# Patient Record
Sex: MALE | Race: WHITE | NOT HISPANIC OR LATINO | Employment: FULL TIME | URBAN - METROPOLITAN AREA
[De-identification: names, ages, dates, MRNs, and addresses within clinical notes are randomized per-mention and may not be internally consistent; named-entity substitution may affect disease eponyms.]

---

## 2017-01-10 ENCOUNTER — PATIENT MESSAGE (OUTPATIENT)
Dept: PSYCHIATRY | Facility: CLINIC | Age: 32
End: 2017-01-10

## 2017-01-10 DIAGNOSIS — F90.0 ATTENTION DEFICIT HYPERACTIVITY DISORDER (ADHD), PREDOMINANTLY INATTENTIVE TYPE: ICD-10-CM

## 2017-01-10 RX ORDER — LISDEXAMFETAMINE DIMESYLATE 50 MG/1
50 CAPSULE ORAL EVERY MORNING
Qty: 30 CAPSULE | Refills: 0 | Status: SHIPPED | OUTPATIENT
Start: 2017-01-10 | End: 2017-01-24 | Stop reason: SDUPTHER

## 2017-01-16 ENCOUNTER — TELEPHONE (OUTPATIENT)
Dept: HEMATOLOGY/ONCOLOGY | Facility: CLINIC | Age: 32
End: 2017-01-16

## 2017-01-16 ENCOUNTER — OFFICE VISIT (OUTPATIENT)
Dept: HEMATOLOGY/ONCOLOGY | Facility: CLINIC | Age: 32
End: 2017-01-16
Payer: COMMERCIAL

## 2017-01-16 ENCOUNTER — CLINICAL SUPPORT (OUTPATIENT)
Dept: HEMATOLOGY/ONCOLOGY | Facility: CLINIC | Age: 32
End: 2017-01-16
Payer: COMMERCIAL

## 2017-01-16 VITALS
TEMPERATURE: 98 F | RESPIRATION RATE: 18 BRPM | SYSTOLIC BLOOD PRESSURE: 160 MMHG | DIASTOLIC BLOOD PRESSURE: 109 MMHG | WEIGHT: 315 LBS | HEIGHT: 76 IN | HEART RATE: 88 BPM | BODY MASS INDEX: 38.36 KG/M2

## 2017-01-16 VITALS — WEIGHT: 315 LBS | HEIGHT: 76 IN | BODY MASS INDEX: 38.36 KG/M2

## 2017-01-16 DIAGNOSIS — F90.1 ATTENTION-DEFICIT HYPERACTIVITY DISORDER, PREDOMINANTLY HYPERACTIVE TYPE: Primary | ICD-10-CM

## 2017-01-16 DIAGNOSIS — F41.1 GENERALIZED ANXIETY DISORDER: ICD-10-CM

## 2017-01-16 DIAGNOSIS — E66.9 OBESITY (BMI 30-39.9): Primary | ICD-10-CM

## 2017-01-16 DIAGNOSIS — D61.3 IDIOPATHIC APLASTIC ANEMIA: ICD-10-CM

## 2017-01-16 DIAGNOSIS — F17.200 SMOKING ADDICTION: ICD-10-CM

## 2017-01-16 DIAGNOSIS — Z71.3 DIETARY COUNSELING: ICD-10-CM

## 2017-01-16 DIAGNOSIS — F41.0 PANIC DISORDER WITHOUT AGORAPHOBIA: ICD-10-CM

## 2017-01-16 DIAGNOSIS — I10 ESSENTIAL HYPERTENSION: ICD-10-CM

## 2017-01-16 PROCEDURE — 99213 OFFICE O/P EST LOW 20 MIN: CPT | Mod: S$GLB,,, | Performed by: INTERNAL MEDICINE

## 2017-01-16 PROCEDURE — 97802 MEDICAL NUTRITION INDIV IN: CPT | Mod: S$GLB,,, | Performed by: DIETITIAN, REGISTERED

## 2017-01-16 PROCEDURE — 99999 PR PBB SHADOW E&M-EST. PATIENT-LVL III: CPT | Mod: PBBFAC,,,

## 2017-01-16 PROCEDURE — 3077F SYST BP >= 140 MM HG: CPT | Mod: S$GLB,,, | Performed by: INTERNAL MEDICINE

## 2017-01-16 PROCEDURE — 3080F DIAST BP >= 90 MM HG: CPT | Mod: S$GLB,,, | Performed by: INTERNAL MEDICINE

## 2017-01-16 PROCEDURE — 99999 PR PBB SHADOW E&M-EST. PATIENT-LVL III: CPT | Mod: PBBFAC,,, | Performed by: INTERNAL MEDICINE

## 2017-01-16 PROCEDURE — 1159F MED LIST DOCD IN RCRD: CPT | Mod: S$GLB,,, | Performed by: INTERNAL MEDICINE

## 2017-01-16 NOTE — TELEPHONE ENCOUNTER
----- Message from Megha Brown sent at 1/16/2017  9:47 AM CST -----  Contact: self  Pt is calling to see if he can see Dr. Bellamy as soon as possible. He states he called him yesterday with test results. He states he would like to discuss them in person. He states he has bad anxiety right now bc of this.     Contact number is [phone]

## 2017-01-16 NOTE — TELEPHONE ENCOUNTER
----- Message from Mehga Brown sent at 1/16/2017  1:30 PM CST -----  Contact: self  Pt states he feels the need to be seen today bc he is having anxiety problems bc of the test results Dr. Bellamy gave him yesterday. He also states he only wants to see Dr. Bellamy.    Contact number is [phone]

## 2017-01-16 NOTE — PROGRESS NOTES
"Referring Physician:Kp Bellamy MD     Reason for visit:  Chief Complaint   Patient presents with    Nutrition Counseling    Obesity    Hypertension        :1985     Allergies Reviewed  Meds Reviewed    Anthropometrics  Weight:(!) 146.5 kg (323 lb)  Height:6' 4" (1.93 m)  BMI:Body mass index is 39.32 kg/(m^2).   IBW:   91.8 kg      Meds:  Outpatient Medications Prior to Visit   Medication Sig Dispense Refill    amlodipine (NORVASC) 10 MG tablet Take 1 tablet (10 mg total) by mouth once daily. 90 tablet 1    CALCIUM CITRATE/VITAMIN D3 (CITRACAL REGULAR ORAL) Take by mouth.      citalopram (CELEXA) 40 MG tablet Take 1 tablet (40 mg total) by mouth once daily. 90 tablet 1    cycloSPORINE modified 25 MG capsule Take 1 capsule (25 mg total) by mouth once daily. 50 capsule 0    ergocalciferol (VITAMIN D2) 50,000 unit Cap Take 50,000 Units by mouth every 7 days.      fexofenadine (ALLEGRA) 30 MG tablet Take 30 mg by mouth as needed.       fluticasone (FLONASE) 50 mcg/actuation nasal spray 2 sprays by Each Nare route once daily. 16 g 3    [START ON 2017] lisdexamfetamine (VYVANSE) 50 MG capsule Take 1 capsule (50 mg total) by mouth every morning. 30 capsule 0    lisdexamfetamine (VYVANSE) 50 MG capsule Take 1 capsule (50 mg total) by mouth every morning. 30 capsule 0    lorazepam (ATIVAN) 1 MG tablet Take 1 tablet (1 mg total) by mouth daily as needed for Anxiety. 30 tablet 5    nystatin-triamcinolone (MYCOLOG) ointment AAA at corner of mouth BID 30 g 1    tretinoin (RETIN-A) 0.025 % cream Apply small amount to entire face qhs. Wash off qam and apply sunscreen. 45 g 3    triamcinolone acetonide 0.025% (KENALOG) 0.025 % Oint Apply topically 2 (two) times daily. Mixed with nystatin ointment to corner of mouth 15 g 1    triamterene-hydrochlorothiazide 37.5-25 mg (DYAZIDE) 37.5-25 mg per capsule TAKE 1 CAPSULE BY MOUTH DAILY 30 capsule 0    valacyclovir (VALTREX) 1000 MG tablet Take 1 tablet " "(1,000 mg total) by mouth every 12 (twelve) hours. Take 2 tabs po BID x 1 day. Begin at first sign of outbreak 180 tablet 1    varenicline (CHANTIX) 1 mg Tab Take 1 tablet (1 mg total) by mouth 2 (two) times daily. 60 tablet 2    VITAMIN B COMPLEX ORAL Take by mouth.       No facility-administered medications prior to visit.          Labs:   N/A     Estimated Nutrition Needs:   2295 Kcals/day( 25 kcal/kg IBW),    73 g protein( 0.8 g/kg IBW)     Diet Hx:   Pt & his fiance' present for encounter for weight management.  Pt is lactose-intolerant.  Pt reports challenges with binge-eating, and states his counselor has advised he eat small, frequent meals/snacks in lieu of 3 meals/day.  Beverages:  Diet Coke; water; Powerade Zero; black coffee; 2 alcoholic beverages nightly (gin and diet tonic).   Exercise:  Inconsistent.    Breakfast:   Black coffee (doesn't eat cereal due to lactose-intolerance; we discussed soymilk or unsweetened almond milk as substitute)  Lunch:   Salad with grilled protein, 1000 island or blue cheese dressing if eating out/at home prepares olive oil and vinegar dressing.  Occasional 6" [employer] sandwich.  Burger Jose Armando Whopper-no cheese/no fries.  Dinner:   Veggie pizza; starch like parul bread/chick pea pasta/rice, veggies, lean protein.    Assessment:   Pt & finance' asked relevant questions about foods recommended & to avoid; healthy portion sizes; reading food labels; sample meal plan and menus; chick pea pasta as healthy carb; salt as seasoning.  All questions answered, and they verbalized understanding of information.      Nutrition Diagnosis:   Obesity RT excess energy intake and physical inactivity AEB Obesity - grade II, BMI 35-39.9    Recommendations: Low fat, high fiber diet.  Exercise 30 minutes per day, 3-5 days per week.  Handouts provided and reviewed:  Cardiac Nutrition Therapy; 1800 Calorie Sample 5-Day Menus; Weight Loss Tips; Label Reading Tips for Weight Management; Cooking Tips for " Weight Management; Get Fit Shopping List; Eating Right for a Healthy Weight; Eat Fit Plan...Anytime/Anywhere; Health and Nutrition-Related Websites; Servings of Carbohydrates for Meal Planning; Walking Works; Building a Healthy Plate;  Heart Healthy Eating:  Shopping Tips and Label Reading Tips        Consultation Time:60 minutes.    Follow Up:  Pt provided with dietitian contact number and advised to call with questions or make future appointment if further intervention needed.
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
## 2017-01-16 NOTE — TELEPHONE ENCOUNTER
The message was forwarded to Dr. Bellamy'  and MA.   reports to have tried to schedule patient but there was no answer.

## 2017-01-24 ENCOUNTER — OFFICE VISIT (OUTPATIENT)
Dept: PSYCHIATRY | Facility: CLINIC | Age: 32
End: 2017-01-24
Payer: COMMERCIAL

## 2017-01-24 ENCOUNTER — LAB VISIT (OUTPATIENT)
Dept: LAB | Facility: HOSPITAL | Age: 32
End: 2017-01-24
Attending: INTERNAL MEDICINE
Payer: COMMERCIAL

## 2017-01-24 VITALS
BODY MASS INDEX: 38.36 KG/M2 | HEART RATE: 98 BPM | DIASTOLIC BLOOD PRESSURE: 99 MMHG | WEIGHT: 315 LBS | SYSTOLIC BLOOD PRESSURE: 134 MMHG | HEIGHT: 76 IN

## 2017-01-24 DIAGNOSIS — F32.5 MAJOR DEPRESSIVE DISORDER WITH SINGLE EPISODE, IN FULL REMISSION: ICD-10-CM

## 2017-01-24 DIAGNOSIS — D61.9 ANEMIA DUE TO BONE MARROW FAILURE: ICD-10-CM

## 2017-01-24 DIAGNOSIS — D61.3 IDIOPATHIC APLASTIC ANEMIA: ICD-10-CM

## 2017-01-24 DIAGNOSIS — F41.9 ANXIETY: ICD-10-CM

## 2017-01-24 DIAGNOSIS — I10 ESSENTIAL HYPERTENSION: ICD-10-CM

## 2017-01-24 DIAGNOSIS — F90.0 ATTENTION DEFICIT HYPERACTIVITY DISORDER (ADHD), PREDOMINANTLY INATTENTIVE TYPE: Primary | ICD-10-CM

## 2017-01-24 LAB
ALBUMIN SERPL BCP-MCNC: 4 G/DL
ANION GAP SERPL CALC-SCNC: 6 MMOL/L
BASOPHILS # BLD AUTO: 0.02 K/UL
BASOPHILS NFR BLD: 0.5 %
BILIRUB UR QL STRIP: NEGATIVE
BUN SERPL-MCNC: 15 MG/DL
CALCIUM SERPL-MCNC: 9.6 MG/DL
CHLORIDE SERPL-SCNC: 107 MMOL/L
CLARITY UR REFRACT.AUTO: ABNORMAL
CO2 SERPL-SCNC: 25 MMOL/L
COLOR UR AUTO: YELLOW
CREAT SERPL-MCNC: 0.9 MG/DL
DIFFERENTIAL METHOD: ABNORMAL
EOSINOPHIL # BLD AUTO: 0.1 K/UL
EOSINOPHIL NFR BLD: 1.7 %
ERYTHROCYTE [DISTWIDTH] IN BLOOD BY AUTOMATED COUNT: 14.4 %
EST. GFR  (AFRICAN AMERICAN): >60 ML/MIN/1.73 M^2
EST. GFR  (NON AFRICAN AMERICAN): >60 ML/MIN/1.73 M^2
GLUCOSE SERPL-MCNC: 106 MG/DL
GLUCOSE UR QL STRIP: NEGATIVE
HCT VFR BLD AUTO: 36.4 %
HGB BLD-MCNC: 13.2 G/DL
HGB UR QL STRIP: NEGATIVE
KETONES UR QL STRIP: NEGATIVE
LEUKOCYTE ESTERASE UR QL STRIP: NEGATIVE
LYMPHOCYTES # BLD AUTO: 1.4 K/UL
LYMPHOCYTES NFR BLD: 33.4 %
MCH RBC QN AUTO: 35.2 PG
MCHC RBC AUTO-ENTMCNC: 36.3 %
MCV RBC AUTO: 97 FL
MONOCYTES # BLD AUTO: 0.6 K/UL
MONOCYTES NFR BLD: 15.3 %
NEUTROPHILS # BLD AUTO: 2 K/UL
NEUTROPHILS NFR BLD: 48.9 %
NITRITE UR QL STRIP: NEGATIVE
PH UR STRIP: 8 [PH] (ref 5–8)
PHOSPHATE SERPL-MCNC: 2.2 MG/DL
PLATELET # BLD AUTO: 59 K/UL
PMV BLD AUTO: 10.4 FL
POTASSIUM SERPL-SCNC: 4.3 MMOL/L
PROT UR QL STRIP: ABNORMAL
RBC # BLD AUTO: 3.75 M/UL
SODIUM SERPL-SCNC: 138 MMOL/L
SP GR UR STRIP: 1.02 (ref 1–1.03)
URN SPEC COLLECT METH UR: ABNORMAL
UROBILINOGEN UR STRIP-ACNC: 1 EU/DL
WBC # BLD AUTO: 4.04 K/UL

## 2017-01-24 PROCEDURE — 3080F DIAST BP >= 90 MM HG: CPT | Mod: S$GLB,,, | Performed by: NURSE PRACTITIONER

## 2017-01-24 PROCEDURE — 99212 OFFICE O/P EST SF 10 MIN: CPT | Mod: S$GLB,,, | Performed by: NURSE PRACTITIONER

## 2017-01-24 PROCEDURE — 3075F SYST BP GE 130 - 139MM HG: CPT | Mod: S$GLB,,, | Performed by: NURSE PRACTITIONER

## 2017-01-24 PROCEDURE — 36415 COLL VENOUS BLD VENIPUNCTURE: CPT

## 2017-01-24 PROCEDURE — 99999 PR PBB SHADOW E&M-EST. PATIENT-LVL IV: CPT | Mod: PBBFAC,,, | Performed by: NURSE PRACTITIONER

## 2017-01-24 PROCEDURE — 88184 FLOWCYTOMETRY/ TC 1 MARKER: CPT

## 2017-01-24 PROCEDURE — 1159F MED LIST DOCD IN RCRD: CPT | Mod: S$GLB,,, | Performed by: NURSE PRACTITIONER

## 2017-01-24 PROCEDURE — 88188 FLOWCYTOMETRY/READ 9-15: CPT

## 2017-01-24 PROCEDURE — 85025 COMPLETE CBC W/AUTO DIFF WBC: CPT

## 2017-01-24 PROCEDURE — 80069 RENAL FUNCTION PANEL: CPT

## 2017-01-24 PROCEDURE — 81003 URINALYSIS AUTO W/O SCOPE: CPT

## 2017-01-24 PROCEDURE — 90833 PSYTX W PT W E/M 30 MIN: CPT | Mod: S$GLB,,, | Performed by: NURSE PRACTITIONER

## 2017-01-24 RX ORDER — LISDEXAMFETAMINE DIMESYLATE 50 MG/1
50 CAPSULE ORAL EVERY MORNING
Qty: 30 CAPSULE | Refills: 0 | Status: SHIPPED | OUTPATIENT
Start: 2017-03-09 | End: 2017-04-12 | Stop reason: SDUPTHER

## 2017-01-24 RX ORDER — LISDEXAMFETAMINE DIMESYLATE 50 MG/1
50 CAPSULE ORAL EVERY MORNING
Qty: 30 CAPSULE | Refills: 0 | Status: SHIPPED | OUTPATIENT
Start: 2017-02-09 | End: 2017-03-11
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
## 2017-01-24 NOTE — PATIENT INSTRUCTIONS
[facility] - DEPARTMENT OF PSYCHIATRY   PATIENT INFORMATION    We appreciate the opportunity to participate in your medical care and hope the following protocols will make it easier for you to receive quality treatment in our department.    1. PUNCTUALITY: Your appointment is scheduled for a fixed amount of time.  To get the benefit of your appointment, please arrive early enough to allow time for traffic, parking and registration.  If you are late for your appointment, you may have to reschedule.  Please make every effort to be on time.      2. PAYMENT FOR SERVICES:   Payments are expected at the time of service.  Please contact [phone] if you need to resolve issues involving your account at [facility] or to set up a payment plan.    3. CANCELLATION / MISSED APPOINTMENTS:   In order to receive quality care, all appointments must be kept.  Appointment may be cancelled at least 24 hours before your appointment time. If you do not give at least 24-hour notice of cancellation a fee may be billed directly to the patient.  Please note that insurance does not cover no-show charges, so you will be billed directly.  If you are consistently late, cancel, or do not show for your appointments, our department reserves the right to terminate treatment     MESSAGES- In general you can reach the department by calling [phone], between 8:00 a.m. and 5:00 p.m., Monday through Friday.  You can also use the [facility] Patient Portal.     AFTER HOURS, WEEKEND OR HOLIDAYS- For urgent questions after hours, weekends and holidays, calling the department number [phone] will connect you with a representative.  EMERGENCY-  In case of a crisis when there is a concern of harm to self or others, call 911 or the office [phone] between 8:00 a.m. and 5:00 p.m., Monday through Friday or go to the [facility] Emergency Room.    4. PRESCRIPTION REFILLS:  Prescription refills must be done at your physician office visit, You  will be given a sufficient number of refills to last until your next appointment, you must come to appointments for prescriptions.   No additional refills will be approved beyond the original treatment plan. Again, please note that no additional prescriptions will be approved per patient request.     5. FOLLOW UP APPOINTMENTS:  Follow-up appointments can be made in person at the Psychiatry Appointment Desk, online through the [facility] Patient Portal, or by calling [phone], from 8am to 5pm, between Monday and Friday.  Patients are responsible for scheduling their own follow-up appointment,  It  Is recommended you schedule your appointment before leaving the clinic.    6. Providers are NOT ABLE to schedule appointments; all appointments must be made through the appointment department or through [facility] Patient Portal.           PATIENTS MAY EXPERIENCE SYMPTOMS OF WITHDRAWAL IF THEY RUN OUT OF MEDICATIONS.  THE PATIENT WILL ASSUME ALL RESPONSIBILITIES OF ANY OUTCOMES WHEN THERE IS AN ISSUE WITH NONCOMPLIANCE WITH FOLLOW-UP APPOINTMENTS AND MEDICATIONS.        THERE IS TO BE NO USE OF ALCOHOL AND/OR ILLEGAL SUBSTANCES    PATIENT ARE TO GO TO THE CLOSEST EMERGENCY ROOM IF FEELING A THREAT TO THEMSELVES OR OTHER OR IF GRAVELY DISABLED    TELL US HOW WE ARE DOING.  PLEASE COMPLETE THE PATIENT SATISFACTION SURVERY  Revised December 2016

## 2017-01-25 LAB
FLOW CYTOMETRY SPECIALIST REVIEW: ABNORMAL
PNH GRANULOCYTES: 9.96 % (ref 0–0.01)
PNH MONOCYTES: 10.21 % (ref 0–0.05)
PNH RBC-COMPLETE AG LOSS: 3.11 % (ref 0–0.01)
PNH RBC-PARTIAL AG LOSS: 0.02 % (ref 0–0.99)

## 2017-02-13 ENCOUNTER — PATIENT MESSAGE (OUTPATIENT)
Dept: HEMATOLOGY/ONCOLOGY | Facility: CLINIC | Age: 32
End: 2017-02-13

## 2017-02-14 ENCOUNTER — OFFICE VISIT (OUTPATIENT)
Dept: SLEEP MEDICINE | Facility: CLINIC | Age: 32
End: 2017-02-14
Payer: COMMERCIAL

## 2017-02-14 VITALS
WEIGHT: 315 LBS | OXYGEN SATURATION: 94 % | SYSTOLIC BLOOD PRESSURE: 158 MMHG | DIASTOLIC BLOOD PRESSURE: 100 MMHG | BODY MASS INDEX: 38.83 KG/M2 | HEART RATE: 98 BPM

## 2017-02-14 DIAGNOSIS — G47.33 OSA (OBSTRUCTIVE SLEEP APNEA): Primary | ICD-10-CM

## 2017-02-14 DIAGNOSIS — R09.81 NASAL CONGESTION: ICD-10-CM

## 2017-02-14 DIAGNOSIS — I10 ESSENTIAL HYPERTENSION: ICD-10-CM

## 2017-02-14 DIAGNOSIS — G47.00 INSOMNIA, UNSPECIFIED TYPE: ICD-10-CM

## 2017-02-14 DIAGNOSIS — F17.200 SMOKING ADDICTION: ICD-10-CM

## 2017-02-14 PROCEDURE — 99999 PR PBB SHADOW E&M-EST. PATIENT-LVL III: CPT | Mod: PBBFAC,,, | Performed by: INTERNAL MEDICINE

## 2017-02-14 PROCEDURE — 3077F SYST BP >= 140 MM HG: CPT | Mod: S$GLB,,, | Performed by: INTERNAL MEDICINE

## 2017-02-14 PROCEDURE — 3080F DIAST BP >= 90 MM HG: CPT | Mod: S$GLB,,, | Performed by: INTERNAL MEDICINE

## 2017-02-14 PROCEDURE — 99214 OFFICE O/P EST MOD 30 MIN: CPT | Mod: S$GLB,,, | Performed by: INTERNAL MEDICINE

## 2017-02-14 RX ORDER — VARENICLINE TARTRATE 1 MG/1
1 TABLET, FILM COATED ORAL 2 TIMES DAILY
Qty: 60 TABLET | Refills: 2 | Status: SHIPPED | OUTPATIENT
Start: 2017-02-14 | End: 2019-12-04

## 2017-02-14 RX ORDER — TRIAMTERENE AND HYDROCHLOROTHIAZIDE 37.5; 25 MG/1; MG/1
1 CAPSULE ORAL DAILY
Qty: 30 CAPSULE | Refills: 0 | Status: SHIPPED | OUTPATIENT
Start: 2017-02-14 | End: 2018-01-02

## 2017-02-14 NOTE — PROGRESS NOTES
Ken Anderson  was seen as a follow up.    CHIEF COMPLAINT:    Chief Complaint   Patient presents with    Sleep Apnea       HISTORY OF PRESENT ILLNESS: Ken Anderson is a 31 y.o. male is here for sleep evaluation.   Patient was told by father and sister (both are sleep boarded) to have sleep apnea.  Patient with loud snoring.  +fatigue upon awakening.  +weight gain 60 # since 2013.  +sleepiness a/w driving on rare occasion.  No parasomnia.  No cataplexy.  No rls symptoms.      Glen Sleepiness Scale score during initial sleep evaluation was 7.  Patient was set up for apap 11/14.   +using apap most night.  Sleep better with apap.  Rested upon awake.  No snoring with apap.  No difficulty with sleep initiation.  No dry mouth  SLEEP ROUTINE:  Activity the hour prior to sleep: read     Bed partner:  alone  Time to bed:  9:45 pm   Lights off:  yes  Sleep onset latency:  10 minutes        Disruptions or awakenings:    none    Wakeup time:      5:30 am  Perceived sleep quality:  Rested      Daytime naps:      none  Weekend sleep routine:      12 am till 8 am   Caffeine use: 2 cups of coffee in am and 1 diet coke throughout the day; last coke around 3 pm  exercise habit:   [employer] 6 am daily    PAST MEDICAL HISTORY:    Active Ambulatory Problems     Diagnosis Date Noted    ADHD (attention deficit hyperactivity disorder) 06/29/2012    Major depressive disorder, recurrent episode, mild 06/29/2012    Generalized anxiety disorder 06/29/2012    Panic disorder without agoraphobia 06/29/2012    Adjustment disorder with mixed emotional features 06/29/2012    Aplastic anemia 07/23/2012    Smoking addiction 08/21/2012    Essential hypertension 10/12/2015    Knee pain, bilateral 02/22/2016     Resolved Ambulatory Problems     Diagnosis Date Noted    Depressive disorder, not elsewhere classified 07/30/2012    Adjustment disorder with mixed anxiety and depressed mood 07/30/2012    ADD (attention deficit disorder)  10/01/2012    Major depressive disorder, recurrent episode, moderate 11/12/2012    Anxiety state, unspecified 01/07/2013    Infiltrate of cornea - Right Eye 03/01/2013    Cornea ulcer - Right Eye 03/04/2013     Past Medical History   Diagnosis Date    Blood transfusion     Depression     GERD (gastroesophageal reflux disease)     History of eye injury 13YRS                PAST SURGICAL HISTORY:    Past Surgical History   Procedure Laterality Date    Esophagogastroduodenoscopy      Colonoscopy      Bone marrow biopsy           FAMILY HISTORY:                Family History   Problem Relation Age of Onset    Cancer Father      skin    Skin cancer Father     Emphysema Maternal Grandmother     Prostate cancer Maternal Grandfather     Dementia Paternal Grandmother     Cancer Paternal Grandfather      liver    Melanoma Neg Hx     Psoriasis Neg Hx     Lupus Neg Hx     Eczema Neg Hx     Amblyopia Neg Hx     Blindness Neg Hx     Cataracts Neg Hx     Diabetes Neg Hx     Glaucoma Neg Hx     Hypertension Neg Hx     Macular degeneration Neg Hx     Retinal detachment Neg Hx     Strabismus Neg Hx     Stroke Neg Hx     Thyroid disease Neg Hx        SOCIAL HISTORY:          Tobacco:   History   Smoking Status    Current Every Day Smoker    Packs/day: 0.30    Years: 7.00    Types: Cigarettes   Smokeless Tobacco    Never Used       alcohol use:    History   Alcohol Use    2.4 oz/week    4 Standard drinks or equivalent per week     Comment: 4 week                 Occupation:   for [employer] school    ALLERGIES:    Review of patient's allergies indicates:   Allergen Reactions    No known drug allergies        CURRENT MEDICATIONS:    Current Outpatient Prescriptions   Medication Sig Dispense Refill    amlodipine (NORVASC) 10 MG tablet Take 1 tablet (10 mg total) by mouth once daily. 90 tablet 1    CALCIUM CITRATE/VITAMIN D3 (CITRACAL REGULAR ORAL) Take by mouth.      citalopram  (CELEXA) 40 MG tablet Take 1 tablet (40 mg total) by mouth once daily. 90 tablet 1    ergocalciferol (VITAMIN D2) 50,000 unit Cap Take 50,000 Units by mouth every 7 days.      fexofenadine (ALLEGRA) 30 MG tablet Take 30 mg by mouth as needed.       fluticasone (FLONASE) 50 mcg/actuation nasal spray 2 sprays by Each Nare route once daily. 16 g 3    lisdexamfetamine (VYVANSE) 50 MG capsule Take 1 capsule (50 mg total) by mouth every morning. 30 capsule 0    [START ON 3/9/2017] lisdexamfetamine (VYVANSE) 50 MG capsule Take 1 capsule (50 mg total) by mouth every morning. 30 capsule 0    lorazepam (ATIVAN) 1 MG tablet Take 1 tablet (1 mg total) by mouth daily as needed for Anxiety. 30 tablet 5    nystatin-triamcinolone (MYCOLOG) ointment AAA at corner of mouth BID 30 g 1    tretinoin (RETIN-A) 0.025 % cream Apply small amount to entire face qhs. Wash off qam and apply sunscreen. 45 g 3    triamterene-hydrochlorothiazide 37.5-25 mg (DYAZIDE) 37.5-25 mg per capsule Take 1 capsule by mouth once daily. 30 capsule 0    valacyclovir (VALTREX) 1000 MG tablet Take 1 tablet (1,000 mg total) by mouth every 12 (twelve) hours. Take 2 tabs po BID x 1 day. Begin at first sign of outbreak 180 tablet 1    varenicline (CHANTIX) 1 mg Tab Take 1 tablet (1 mg total) by mouth 2 (two) times daily. 60 tablet 2    VITAMIN B COMPLEX ORAL Take by mouth.      cycloSPORINE modified 25 MG capsule Take 1 capsule (25 mg total) by mouth once daily. 50 capsule 0    triamcinolone acetonide 0.025% (KENALOG) 0.025 % Oint Apply topically 2 (two) times daily. Mixed with nystatin ointment to corner of mouth 15 g 1     No current facility-administered medications for this visit.                   REVIEW OF SYSTEMS:   No acute changes from previous encounter dated 9/23/2014 with exceptions mentioned in HPI.            PHYSICAL EXAM:  Vitals:    02/14/17 0902   BP: (!) 158/100   Pulse: 98   SpO2: (!) 94%   Weight: (!) 144.7 kg (319 lb)   PainSc:  0-No pain     Body mass index is 38.83 kg/(m^2).     GENERAL: Normal development, well groomed  HEENT:  Conjunctivae are non-erythematous; Pupils equal, round, and reactive to light; Nose is symmetrical; Nasal mucosa is pink and moist; Septum is midline; Inferior turbinates are normal; Nasal airflow is congested; Posterior pharynx is pink; Modified Mallampati: 4; Posterior palate is normal; Tonsils +2; Uvula is normal and pink;Tongue is normal; Dentition is fair; No TMJ tenderness; Jaw opening and protrusion without click and without discomfort.  NECK: Supple. Neck circumference is 16.75 inches. No thyromegaly. No palpable nodes.     SKIN: On face and neck: No abrasions, no rashes, no lesions.  No subcutaneous nodules are palpable.  RESPIRATORY: Chest is clear to auscultation.  Normal chest expansion and non-labored breathing at rest.  CARDIOVASCULAR: Normal S1, S2.  No murmurs, gallops or rubs. No carotid bruits bilaterally.  EXTREMITIES: No edema. No clubbing. No cyanosis. Station normal. Gait normal.        NEURO/PSYCH: Oriented to time, place and person. Normal attention span and concentration. Affect is full. Mood is normal.                                              DATA hst 11/10/14 ahi of 9    No results found for: TSH  ASSESSMENT    ICD-10-CM ICD-9-CM    1. JUSTO (obstructive sleep apnea) G47.33 327.23 CPAP/BIPAP SUPPLIES   2. Nasal congestion R09.81 478.19    3. Insomnia, unspecified type G47.00 780.52    4. Essential hypertension I10 401.9 triamterene-hydrochlorothiazide 37.5-25 mg (DYAZIDE) 37.5-25 mg per capsule   5. Smoking addiction F17.200 305.1 varenicline (CHANTIX) 1 mg Tab       PLAN:    Sleep Apnea -    Doing well with apap and nasal mask.  Have not need chin strap.      Nasal congestion - currently on flonase, sinus irrigation and allegra.      Insomnia - sleep initiation improve.  Prn ativan.    Tobacco abuse - would like to be back on chantix.    htn - will refill 1 month prescription for  dyazide.  Patient will follow up with nephrology for bp management.      Education: During our discussion today, we talked about the etiology of obstructive sleep apnea as well as the potential ramifications of untreated sleep apnea, which could include daytime sleepiness, hypertension, heart disease and/or stroke.      Precautions: The patient was advised to abstain from driving should they feel sleepy or drowsy.     Patient will Return in about 1 year (around 2/14/2018).     This is 25 minutes visit, over 50% of time spent in direct consultation with patient.

## 2017-02-15 ENCOUNTER — TELEPHONE (OUTPATIENT)
Dept: HEMATOLOGY/ONCOLOGY | Facility: CLINIC | Age: 32
End: 2017-02-15

## 2017-02-19 ENCOUNTER — HOSPITAL ENCOUNTER (EMERGENCY)
Facility: OTHER | Age: 32
Discharge: HOME | End: 2017-02-19
Attending: EMERGENCY MEDICINE
Payer: COMMERCIAL

## 2017-02-19 ENCOUNTER — NURSE TRIAGE (OUTPATIENT)
Dept: ADMINISTRATIVE | Facility: CLINIC | Age: 32
End: 2017-02-19

## 2017-02-19 VITALS
HEART RATE: 75 BPM | WEIGHT: 315 LBS | OXYGEN SATURATION: 100 % | HEIGHT: 76 IN | TEMPERATURE: 98 F | DIASTOLIC BLOOD PRESSURE: 92 MMHG | SYSTOLIC BLOOD PRESSURE: 158 MMHG | RESPIRATION RATE: 17 BRPM | BODY MASS INDEX: 38.36 KG/M2

## 2017-02-19 DIAGNOSIS — H72.92 PERFORATED TYMPANIC MEMBRANE, LEFT: Primary | ICD-10-CM

## 2017-02-19 PROCEDURE — 99283 EMERGENCY DEPT VISIT LOW MDM: CPT

## 2017-02-19 RX ORDER — TRAMADOL HYDROCHLORIDE 50 MG/1
50 TABLET ORAL EVERY 6 HOURS PRN
Qty: 8 TABLET | Refills: 0 | Status: SHIPPED | OUTPATIENT
Start: 2017-02-19 | End: 2017-02-27

## 2017-02-19 NOTE — ED TRIAGE NOTES
"Pt reports "On Tuesday I felt like there was a lot of wax in my ears." decrease in hearing especially in left ear  "

## 2017-02-19 NOTE — ED AVS SNAPSHOT
[facility]  [address]  [facility] [address]               Ken Anderson   2017 12:58 PM   ED    Description:  Male : 1985   Department:  [facility]           Your Care was Coordinated By:     Provider Role From To    Saul Garvey MD Attending Provider [phone] --    Callie Ellis PA-C Physician Assistant 17 [phone] --      Reason for Visit     Cerumen Impaction           Diagnoses this Visit        Comments    Perforated tympanic membrane, left    -  Primary       ED Disposition     ED Disposition Condition Comment    Discharge             To Do List           Follow-up Information     Follow up with Niranjan Schaffer MD In 2 days.    Specialty:  Otolaryngology    Why:  For symptom re-check.     Contact information:    [address]  [facility] [address]  [phone]          Follow up with PRESTON Bowers MD In 1 day.    Specialties:  Otolaryngology, Pediatric Otolaryngology    Why:  For symptom re-check.     Contact information:    [address]  [facility] [address]  [phone]         These Medications        Disp Refills Start End    tramadol (ULTRAM) 50 mg tablet 8 tablet 0 2017    Take 1 tablet (50 mg total) by mouth every 6 (six) hours as needed. - Oral    Pharmacy: [facility] [address] [place] [address] & [facility] Ph #: [phone]         [facility]     [facility] -  Assistance  Registered nurses in the [facility] provide clinical advisement, health education, appointment booking, and other advisory services.  Call for this free service at [phone].             Medications           Message regarding Medications     Verify the changes and/or additions to your medication regime listed below are the same as discussed with your clinician today.  If any of these  changes or additions are incorrect, please notify your healthcare provider.        START taking these NEW medications        Refills    tramadol (ULTRAM) 50 mg tablet 0    Sig: Take 1 tablet (50 mg total) by mouth every 6 (six) hours as needed.    Class: Print    Route: Oral           Verify that the below list of medications is an accurate representation of the medications you are currently taking.  If none reported, the list may be blank. If incorrect, please contact your healthcare provider. Carry this list with you in case of emergency.           Current Medications     amlodipine (NORVASC) 10 MG tablet Take 1 tablet (10 mg total) by mouth once daily.    CALCIUM CITRATE/VITAMIN D3 (CITRACAL REGULAR ORAL) Take by mouth.    citalopram (CELEXA) 40 MG tablet Take 1 tablet (40 mg total) by mouth once daily.    ergocalciferol (VITAMIN D2) 50,000 unit Cap Take 50,000 Units by mouth every 7 days.    fexofenadine (ALLEGRA) 30 MG tablet Take 30 mg by mouth as needed.     fluticasone (FLONASE) 50 mcg/actuation nasal spray 2 sprays by Each Nare route once daily.    lisdexamfetamine (VYVANSE) 50 MG capsule Take 1 capsule (50 mg total) by mouth every morning.    lisdexamfetamine (VYVANSE) 50 MG capsule Starting on Mar 09, 2017. Take 1 capsule (50 mg total) by mouth every morning.    lorazepam (ATIVAN) 1 MG tablet Take 1 tablet (1 mg total) by mouth daily as needed for Anxiety.    nystatin-triamcinolone (MYCOLOG) ointment AAA at corner of mouth BID    tretinoin (RETIN-A) 0.025 % cream Apply small amount to entire face qhs. Wash off qam and apply sunscreen.    triamterene-hydrochlorothiazide 37.5-25 mg (DYAZIDE) 37.5-25 mg per capsule Take 1 capsule by mouth once daily.    valacyclovir (VALTREX) 1000 MG tablet Take 1 tablet (1,000 mg total) by mouth every 12 (twelve) hours. Take 2 tabs po BID x 1 day. Begin at first sign of outbreak    varenicline (CHANTIX) 1 mg Tab Take 1 tablet (1 mg total) by mouth 2 (two) times daily.     "VITAMIN B COMPLEX ORAL Take by mouth.    cycloSPORINE modified 25 MG capsule Take 1 capsule (25 mg total) by mouth once daily.    tramadol (ULTRAM) 50 mg tablet Take 1 tablet (50 mg total) by mouth every 6 (six) hours as needed.    triamcinolone acetonide 0.025% (KENALOG) 0.025 % Oint Apply topically 2 (two) times daily. Mixed with nystatin ointment to corner of mouth           Clinical Reference Information           Your Vitals Were     BP Pulse Temp Resp Height Weight    173/104 (BP Location: Left arm, Patient Position: Sitting) 100 97.4 °F (36.3 °C) 18 6' 4" (1.93 m) 147.4 kg (325 lb)    SpO2 BMI             98% 39.56 kg/m2         Allergies as of 2/19/2017        Reactions    No Known Drug Allergies       Immunizations Administered on Date of Encounter - 2/19/2017     None      ED Micro, Lab, POCT     None      ED Imaging Orders     None        Discharge Instructions         Ruptured Eardrum, Traumatic    The eardrum is a thin membrane about one inch from the opening of the ear canal. It is easily injured by objects put into the ear canal. It may also be injured by a loud noise close to the ear or a slap to the ear. A ruptured eardrum will cause pain. There may be some clear or bloody drainage. A buzzing sound may be heard in the ear. Some hearing may be lost the affected ear.  The goal is to keep the ear dry and clean until the eardrum heals. Antibiotics may be prescribed if infection is present. Follow-up with ear and hearing specialists is advised. In many cases, hearing returns to normal after the eardrum heals.  Home care  · Keep a cotton ball in the ear to keep it clean and protect the inner ear.  · Do not use eardrops unless prescribed by the healthcare provider.  · Do not get water in your ear when showering or bathing. No swimming until approved by the healthcare provider.  · Take any prescription or over-the-counter medicines as advised.  Follow-up care  Follow up with specialists for test or exams as " directed. A hearing test should be done soon after the injury. Also follow up within 2 weeks to check healing of the eardrum.  When to seek medical advice  Fever in children:  · Child is 3 months old or younger and has a fever of 100.4°F (38°C) or higher. (Get medical care right away. Fever in a young baby can be a sign of a dangerous infection.)  · Child is younger than 2 years of age and has a fever of 100.4°F (38°C) that continues for more than 1 day.  · Child is 2 years old or older and has a fever of 100.4°F (38°C) that continues for more than 3 days.  · Child is of any age and has repeated fevers above 104°F (40°C).  Fever in adults:  · Fever of 100.4°F (38°C)  Also call for any of the following:  · Fluid drainage from the ear for longer than 24 hours  · Increasing ear pain  · Worsening headache or dizziness  · Worsening hearing loss  Date Last Reviewed: 5/3/2015  © 3146-7752 [employer]. [address] [place] [address]. All rights reserved. This information is not intended as a substitute for professional medical care. Always follow your healthcare professional's instructions.          Your Scheduled Appointments     Feb 23, 2017  9:30 AM CST   Established Patient Visit with HIRAM [facility] ([facility])    [address]  [place] [address]   [phone]            Mar 31, 2017  4:00 PM CDT   Non-Fasting Lab with LAB, [facility] ([facility])    [address]  [place] [address]   [phone]            Apr 05, 2017  4:30 PM CDT   Established Patient Visit with MD Hunter Tijerina - Nephrology ([facility] )    [address]  [place] [address]   [phone]            Apr 28, 2017  4:00 PM CDT   Non-Fasting Lab with LAB, [facility] ([facility])    [address] [facility]  [id number] [phone]            May 26, 2017  4:00 PM CDT   Non-Fasting Lab with LAB, [facility]   [facility] ([facility])    [place]  [facility] [address] [phone]              Smoking Cessation     If you would like to quit smoking:   You may be eligible for free services if you are a [place] resident and started smoking cigarettes before September 1, 1988.  Call the Smoking Cessation Trust (SCT) toll free at (554) 599-8898 or (002) 264-9641.   Call 0-592-QUIT-NOW if you do not meet the above criteria.             [facility] complies with applicable Federal civil rights laws and does not discriminate on the basis of race, color, national origin, age, disability, or sex.        Language Assistance Services     ATTENTION: Language assistance services are available, free of charge. Please call [phone].      ATENCIÓN: Si habla español, tiene a sandy disposición servicios gratuitos de asistencia lingüística. Llame al [phone].     CHÚ Ý: N?u b?n nói Ti?ng Vi?t, có các d?ch v? h? tr? ngôn ng? mi?n phí dành cho b?n. G?i s? [phone].

## 2017-02-19 NOTE — TELEPHONE ENCOUNTER
Reason for Disposition   Caller has already spoken with the PCP and has no further questions.    Protocols used: ST NO CONTACT OR DUPLICATE CONTACT CALL-A-AH

## 2017-02-19 NOTE — DISCHARGE INSTRUCTIONS
Ruptured Eardrum, Traumatic    The eardrum is a thin membrane about one inch from the opening of the ear canal. It is easily injured by objects put into the ear canal. It may also be injured by a loud noise close to the ear or a slap to the ear. A ruptured eardrum will cause pain. There may be some clear or bloody drainage. A buzzing sound may be heard in the ear. Some hearing may be lost the affected ear.  The goal is to keep the ear dry and clean until the eardrum heals. Antibiotics may be prescribed if infection is present. Follow-up with ear and hearing specialists is advised. In many cases, hearing returns to normal after the eardrum heals.  Home care  · Keep a cotton ball in the ear to keep it clean and protect the inner ear.  · Do not use eardrops unless prescribed by the healthcare provider.  · Do not get water in your ear when showering or bathing. No swimming until approved by the healthcare provider.  · Take any prescription or over-the-counter medicines as advised.  Follow-up care  Follow up with specialists for test or exams as directed. A hearing test should be done soon after the injury. Also follow up within 2 weeks to check healing of the eardrum.  When to seek medical advice  Fever in children:  · Child is 3 months old or younger and has a fever of 100.4°F (38°C) or higher. (Get medical care right away. Fever in a young baby can be a sign of a dangerous infection.)  · Child is younger than 2 years of age and has a fever of 100.4°F (38°C) that continues for more than 1 day.  · Child is 2 years old or older and has a fever of 100.4°F (38°C) that continues for more than 3 days.  · Child is of any age and has repeated fevers above 104°F (40°C).  Fever in adults:  · Fever of 100.4°F (38°C)  Also call for any of the following:  · Fluid drainage from the ear for longer than 24 hours  · Increasing ear pain  · Worsening headache or dizziness  · Worsening hearing loss  Date Last Reviewed: 5/3/2015  ©  2083-5263 [employer]. [address] [place] [address], [place] [address]. All rights reserved. This information is not intended as a substitute for professional medical care. Always follow your healthcare professional's instructions.

## 2017-02-19 NOTE — ED PROVIDER NOTES
Encounter Date: 2/19/2017       History     Chief Complaint   Patient presents with    Cerumen Impaction     in both ears, but mostly the left      Review of patient's allergies indicates:   Allergen Reactions    No known drug allergies      HPI Comments: Patient is 31 year old male who presents with complaints of left ear pain, pressure and tinnitus that is been present for the last few days.  He reports using the Brox at home with no significant improvement.  He also reports self irrigating his left ear with water.  He reports after this he felt worsening fullness, tendinitis and a little off balance.  She states going to sleep for about an hour and a waking with similar symptoms.  This prompted him to report to the emergency department.  He has no report of your bleeding or discharge, trauma or injury or history of perforated tympanic membrane.  He denies fever, chills, nausea, vomiting, chest pain, shortness of breath.  He is currently unaccompanied in the emergency department.  Of note, he reports having planned to take a flight tomorrow and will be away on business for 1 week.    The history is provided by the patient.     Past Medical History   Diagnosis Date    ADHD (attention deficit hyperactivity disorder)     Adjustment disorder with mixed emotional features 6/29/2012    Anxiety state, unspecified 1/7/2013    Aplastic anemia      aplastic anemia    Blood transfusion     Depression     Essential hypertension 10/12/2015    Generalized anxiety disorder 6/29/2012    GERD (gastroesophageal reflux disease)     History of eye injury 13YRS     finger stuck ?eye    Major depressive disorder, recurrent episode, mild 6/29/2012    Panic disorder without agoraphobia 6/29/2012     Past Medical History Pertinent Negatives   Diagnosis Date Noted    Allergy 7/23/2012    Amblyopia 5/9/2013    Arthritis 7/23/2012    Bladder cancer 7/23/2012    Bone cancer 7/23/2012    Brain cancer 7/23/2012    Breast  cancer 7/23/2012    Cataract 7/23/2012    Cataract 3/24/2014    Cervical cancer 7/23/2012    CHF (congestive heart failure) 7/23/2012    Chronic kidney disease 7/23/2012    Clotting disorder 7/23/2012    Colon cancer 7/23/2012    COPD (chronic obstructive pulmonary disease) 7/23/2012    Diabetes mellitus 7/23/2012    Diabetes mellitus 3/24/2014    Diabetic retinopathy 5/9/2013    Diabetic retinopathy 3/24/2014    Emphysema of lung 7/23/2012    Esophageal cancer 7/23/2012    Glaucoma 7/23/2012    Glaucoma 3/24/2014    Heart murmur 7/23/2012    HIV infection 7/23/2012    Leukemia 7/23/2012    Lung cancer 7/23/2012    Macular degeneration 5/9/2013    Meningitis 7/23/2012    Myocardial infarction 7/23/2012    Neuromuscular disorder 7/23/2012    Osteoporosis 7/23/2012    Ovarian cancer 7/23/2012    Pancreatic cancer 7/23/2012    Prostate cancer 7/23/2012    Retinal detachment 5/9/2013    Seizures 7/23/2012    Sickle cell anemia 7/23/2012    Skin cancer 7/23/2012    Small intestine cancer 7/23/2012    Stomach cancer 7/23/2012    Strabismus 5/9/2013    Stroke 7/23/2012    Substance abuse 7/23/2012    Thyroid disease 7/23/2012    Tuberculosis 7/23/2012    Ulcer 7/23/2012    Uterine cancer 7/23/2012    Uveitis 5/9/2013     Past Surgical History   Procedure Laterality Date    Esophagogastroduodenoscopy      Colonoscopy      Bone marrow biopsy       Family History   Problem Relation Age of Onset    Cancer Father      skin    Skin cancer Father     Emphysema Maternal Grandmother     Prostate cancer Maternal Grandfather     Dementia Paternal Grandmother     Cancer Paternal Grandfather      liver    Melanoma Neg Hx     Psoriasis Neg Hx     Lupus Neg Hx     Eczema Neg Hx     Amblyopia Neg Hx     Blindness Neg Hx     Cataracts Neg Hx     Diabetes Neg Hx     Glaucoma Neg Hx     Hypertension Neg Hx     Macular degeneration Neg Hx     Retinal detachment Neg Hx      Strabismus Neg Hx     Stroke Neg Hx     Thyroid disease Neg Hx      Social History   Substance Use Topics    Smoking status: Current Every Day Smoker     Packs/day: 0.30     Years: 7.00     Types: Cigarettes    Smokeless tobacco: Never Used    Alcohol use 2.4 oz/week     4 Standard drinks or equivalent per week      Comment: 4 week     Review of Systems   Constitutional: Negative for chills and fever.   HENT: Positive for ear pain. Negative for ear discharge, sore throat and trouble swallowing.         Left ear pressure and tinnitus with decreased hearing   Eyes: Negative for visual disturbance.   Respiratory: Negative for cough and shortness of breath.    Cardiovascular: Negative for chest pain.   Gastrointestinal: Negative for abdominal pain, constipation, diarrhea, nausea and vomiting.   Genitourinary: Negative for dysuria and flank pain.   Musculoskeletal: Negative for back pain, neck pain and neck stiffness.   Skin: Negative for rash.   Neurological: Negative for dizziness, syncope, weakness and headaches.   Psychiatric/Behavioral: Negative for confusion.       Physical Exam   Initial Vitals   BP Pulse Resp Temp SpO2   02/19/17 1221 02/19/17 1221 02/19/17 1221 02/19/17 1221 02/19/17 1221   173/104 100 18 97.4 °F (36.3 °C) 98 %     Physical Exam    Nursing note and vitals reviewed.  Constitutional: He appears well-developed and well-nourished. He is not diaphoretic. No distress.   Healthy appearing  male in no acute distress or apparent pain sitting in exam recliner with leg crossed reading on his iPhone.  He makes good eye contact, speaks in clear full sentences and ambulates with ease.   HENT:   Head: Normocephalic and atraumatic.   Right tympanic membrane is clear with no erythema or bulging retraction or perforation.  Canal has no cerumen.  There is no tragus or mastoid tenderness to palpation.    Left tympanic membrane is not visualized and there is a smooth light brown coating of wax around  the entire canal.  There is no bleeding, purulence or foreign object present.  There is no tragus or mastoid tenderness to palpation.    Gross hearing assessment does reveal decreased left-sided hearing compared to right.   Eyes: Conjunctivae and EOM are normal. Pupils are equal, round, and reactive to light. Right eye exhibits no discharge. Left eye exhibits no discharge. No scleral icterus.   No nystagmus   Neck: Normal range of motion. Neck supple.   Cardiovascular: Normal rate, regular rhythm and normal heart sounds. Exam reveals no gallop and no friction rub.    No murmur heard.  Pulmonary/Chest: Breath sounds normal. He has no wheezes. He has no rhonchi. He has no rales.   Abdominal: Soft. Bowel sounds are normal. There is no tenderness. There is no rebound and no guarding.   Musculoskeletal: Normal range of motion. He exhibits no edema or tenderness.   Lymphadenopathy:     He has no cervical adenopathy.   Neurological: He is alert and oriented to person, place, and time. He has normal strength.   No gait abnormalities   Skin: Skin is warm and dry. No rash and no abscess noted. No erythema.   Psychiatric: He has a normal mood and affect. His behavior is normal. Thought content normal.         ED Course   Procedures  Labs Reviewed - No data to display          Medical Decision Making:   ED Management:  Urgent evaluation of 31-year-old male who presents with complaints consistent with left tympanic membrane perforation.  Patient is afebrile, nontoxic appearing, hemodynamically stable.  Physical exam outlined above and reveals suspected TM perforation given there is no tympanic membrane visualized.  There is no evidence of infection.  No antibiotics are warranted.  We will encourage patient to prevent producing anything into his left ear canal and have him follow up with otolaryngology as early as tomorrow for symptom recheck.  He is amenable to this plan.  Case is discussed with attending who also evaluated the  patient and agrees with plan.                   ED Course     Clinical Impression:   The encounter diagnosis was Perforated tympanic membrane, left.          Callie Ellis PA-C  02/19/17 [id number]

## 2017-03-02 ENCOUNTER — LAB VISIT (OUTPATIENT)
Dept: LAB | Facility: HOSPITAL | Age: 32
End: 2017-03-02
Attending: INTERNAL MEDICINE
Payer: COMMERCIAL

## 2017-03-02 DIAGNOSIS — D61.3 IDIOPATHIC APLASTIC ANEMIA: ICD-10-CM

## 2017-03-02 DIAGNOSIS — I10 ESSENTIAL HYPERTENSION: ICD-10-CM

## 2017-03-02 DIAGNOSIS — D61.9 ANEMIA DUE TO BONE MARROW FAILURE: ICD-10-CM

## 2017-03-02 LAB
ALBUMIN SERPL BCP-MCNC: 3.7 G/DL
ALP SERPL-CCNC: 96 U/L
ALT SERPL W/O P-5'-P-CCNC: 33 U/L
ANION GAP SERPL CALC-SCNC: 8 MMOL/L
AST SERPL-CCNC: 24 U/L
BASOPHILS # BLD AUTO: 0.02 K/UL
BASOPHILS NFR BLD: 0.6 %
BILIRUB SERPL-MCNC: 0.6 MG/DL
BUN SERPL-MCNC: 14 MG/DL
CALCIUM SERPL-MCNC: 9.3 MG/DL
CHLORIDE SERPL-SCNC: 105 MMOL/L
CO2 SERPL-SCNC: 24 MMOL/L
CREAT SERPL-MCNC: 0.9 MG/DL
DIFFERENTIAL METHOD: ABNORMAL
EOSINOPHIL # BLD AUTO: 0.1 K/UL
EOSINOPHIL NFR BLD: 2.5 %
ERYTHROCYTE [DISTWIDTH] IN BLOOD BY AUTOMATED COUNT: 15.2 %
EST. GFR  (AFRICAN AMERICAN): >60 ML/MIN/1.73 M^2
EST. GFR  (NON AFRICAN AMERICAN): >60 ML/MIN/1.73 M^2
GLUCOSE SERPL-MCNC: 146 MG/DL
HCT VFR BLD AUTO: 38.1 %
HGB BLD-MCNC: 13.5 G/DL
LDH SERPL L TO P-CCNC: 289 U/L
LYMPHOCYTES # BLD AUTO: 1.6 K/UL
LYMPHOCYTES NFR BLD: 43.7 %
MCH RBC QN AUTO: 35.6 PG
MCHC RBC AUTO-ENTMCNC: 35.4 %
MCV RBC AUTO: 101 FL
MONOCYTES # BLD AUTO: 0.4 K/UL
MONOCYTES NFR BLD: 11.8 %
NEUTROPHILS # BLD AUTO: 1.5 K/UL
NEUTROPHILS NFR BLD: 41.1 %
PLATELET # BLD AUTO: 60 K/UL
PMV BLD AUTO: 11.4 FL
POTASSIUM SERPL-SCNC: 3.9 MMOL/L
PROT SERPL-MCNC: 7.7 G/DL
RBC # BLD AUTO: 3.79 M/UL
SODIUM SERPL-SCNC: 137 MMOL/L
WBC # BLD AUTO: 3.55 K/UL

## 2017-03-02 PROCEDURE — 80053 COMPREHEN METABOLIC PANEL: CPT

## 2017-03-02 PROCEDURE — 83615 LACTATE (LD) (LDH) ENZYME: CPT

## 2017-03-02 PROCEDURE — 85025 COMPLETE CBC W/AUTO DIFF WBC: CPT

## 2017-03-02 PROCEDURE — 36415 COLL VENOUS BLD VENIPUNCTURE: CPT

## 2017-03-15 DIAGNOSIS — I10 ESSENTIAL HYPERTENSION: ICD-10-CM

## 2017-03-15 RX ORDER — TRIAMTERENE AND HYDROCHLOROTHIAZIDE 37.5; 25 MG/1; MG/1
1 CAPSULE ORAL DAILY
Qty: 30 CAPSULE | Refills: 0 | OUTPATIENT
Start: 2017-03-15

## 2017-03-15 NOTE — TELEPHONE ENCOUNTER
----- Message from Mis Jimenez sent at 3/15/2017  2:47 PM CDT -----  Contact: [facility] [phone]  _  1st Request  _  2nd Request  _  3rd Request    Please refill the medication(s) listed below. Please call the patient when the prescription(s) is ready for  at the phone number (___[phone]___[phone]_____) .    Medication #1triamterene-hydrochlorothiazide 37.5-25 mg (DYAZIDE) 37.5-25 mg per capsule  Refill authorization    Medication #2      Preferred Pharmacy:[facility] [phone]

## 2017-03-22 DIAGNOSIS — I10 ESSENTIAL HYPERTENSION: ICD-10-CM

## 2017-03-22 NOTE — TELEPHONE ENCOUNTER
----- Message from Melissa Marlow sent at 3/22/2017  3:20 PM CDT -----  Contact: Ivon with [facility]  X   1st Request  _  2nd Request  _  3rd Request        Who: Ivon with [facility]    Why: Ivon is requesting a refill on the pt's triamterene-hydrochlorothiazide 37.5-25 mg (DYAZIDE) 37.5-25 mg per capsule. Please call with any questions, thanks!    What Number to Call Back: [phone]    When to Expect a call back: (Before the end of the day)   -- if the call is after 12:00, the call back will be tomorrow.

## 2017-03-23 RX ORDER — TRIAMTERENE AND HYDROCHLOROTHIAZIDE 37.5; 25 MG/1; MG/1
1 CAPSULE ORAL DAILY
Qty: 30 CAPSULE | Refills: 0 | OUTPATIENT
Start: 2017-03-23

## 2017-04-05 ENCOUNTER — LAB VISIT (OUTPATIENT)
Dept: LAB | Facility: HOSPITAL | Age: 32
End: 2017-04-05
Attending: INTERNAL MEDICINE
Payer: COMMERCIAL

## 2017-04-05 ENCOUNTER — OFFICE VISIT (OUTPATIENT)
Dept: HEMATOLOGY/ONCOLOGY | Facility: CLINIC | Age: 32
End: 2017-04-05
Payer: COMMERCIAL

## 2017-04-05 VITALS
DIASTOLIC BLOOD PRESSURE: 84 MMHG | BODY MASS INDEX: 38.36 KG/M2 | TEMPERATURE: 99 F | SYSTOLIC BLOOD PRESSURE: 137 MMHG | HEIGHT: 76 IN | HEART RATE: 77 BPM | WEIGHT: 315 LBS

## 2017-04-05 DIAGNOSIS — D61.3 IDIOPATHIC APLASTIC ANEMIA: ICD-10-CM

## 2017-04-05 DIAGNOSIS — D61.9 ANEMIA DUE TO BONE MARROW FAILURE: ICD-10-CM

## 2017-04-05 DIAGNOSIS — F90.1 ATTENTION-DEFICIT HYPERACTIVITY DISORDER, PREDOMINANTLY HYPERACTIVE TYPE: Primary | ICD-10-CM

## 2017-04-05 DIAGNOSIS — F17.200 SMOKING ADDICTION: ICD-10-CM

## 2017-04-05 DIAGNOSIS — F41.1 GENERALIZED ANXIETY DISORDER: ICD-10-CM

## 2017-04-05 LAB
ALBUMIN SERPL BCP-MCNC: 3.7 G/DL
ALP SERPL-CCNC: 74 U/L
ALT SERPL W/O P-5'-P-CCNC: 22 U/L
ANION GAP SERPL CALC-SCNC: 9 MMOL/L
AST SERPL-CCNC: 21 U/L
BASOPHILS # BLD AUTO: 0.01 K/UL
BASOPHILS NFR BLD: 0.3 %
BILIRUB SERPL-MCNC: 0.4 MG/DL
BUN SERPL-MCNC: 23 MG/DL
CALCIUM SERPL-MCNC: 9.4 MG/DL
CHLORIDE SERPL-SCNC: 107 MMOL/L
CO2 SERPL-SCNC: 24 MMOL/L
CREAT SERPL-MCNC: 1 MG/DL
DIFFERENTIAL METHOD: ABNORMAL
EOSINOPHIL # BLD AUTO: 0.1 K/UL
EOSINOPHIL NFR BLD: 2 %
ERYTHROCYTE [DISTWIDTH] IN BLOOD BY AUTOMATED COUNT: 15.5 %
EST. GFR  (AFRICAN AMERICAN): >60 ML/MIN/1.73 M^2
EST. GFR  (NON AFRICAN AMERICAN): >60 ML/MIN/1.73 M^2
GLUCOSE SERPL-MCNC: 114 MG/DL
HCT VFR BLD AUTO: 35.7 %
HGB BLD-MCNC: 12.7 G/DL
LDH SERPL L TO P-CCNC: 262 U/L
LYMPHOCYTES # BLD AUTO: 1.2 K/UL
LYMPHOCYTES NFR BLD: 38.9 %
MCH RBC QN AUTO: 35.3 PG
MCHC RBC AUTO-ENTMCNC: 35.6 %
MCV RBC AUTO: 99 FL
MONOCYTES # BLD AUTO: 0.4 K/UL
MONOCYTES NFR BLD: 14.5 %
NEUTROPHILS # BLD AUTO: 1.3 K/UL
NEUTROPHILS NFR BLD: 44.3 %
PLATELET # BLD AUTO: 66 K/UL
PMV BLD AUTO: 10.6 FL
POTASSIUM SERPL-SCNC: 4.3 MMOL/L
PROT SERPL-MCNC: 7.3 G/DL
RBC # BLD AUTO: 3.6 M/UL
SODIUM SERPL-SCNC: 140 MMOL/L
WBC # BLD AUTO: 2.96 K/UL

## 2017-04-05 PROCEDURE — 99999 PR PBB SHADOW E&M-EST. PATIENT-LVL II: CPT | Mod: PBBFAC,,, | Performed by: INTERNAL MEDICINE

## 2017-04-05 PROCEDURE — 85025 COMPLETE CBC W/AUTO DIFF WBC: CPT

## 2017-04-05 PROCEDURE — 80053 COMPREHEN METABOLIC PANEL: CPT

## 2017-04-05 PROCEDURE — 99213 OFFICE O/P EST LOW 20 MIN: CPT | Mod: S$GLB,,, | Performed by: INTERNAL MEDICINE

## 2017-04-05 PROCEDURE — 83615 LACTATE (LD) (LDH) ENZYME: CPT

## 2017-04-05 PROCEDURE — 36415 COLL VENOUS BLD VENIPUNCTURE: CPT

## 2017-04-05 PROCEDURE — 3079F DIAST BP 80-89 MM HG: CPT | Mod: S$GLB,,, | Performed by: INTERNAL MEDICINE

## 2017-04-05 PROCEDURE — 3075F SYST BP GE 130 - 139MM HG: CPT | Mod: S$GLB,,, | Performed by: INTERNAL MEDICINE

## 2017-04-05 NOTE — LETTER
April 5, 2017        Felix Adorno MD  [address]  [facility] [address]             [facility]  [address]  [facility] [address]  Phone: [phone]   Patient: Ken Anderson   MR Number: [id number]   YOB: 1985   Date of Visit: 4/5/2017       Dear Dr. Adorno:    Thank you for referring Ken Anderson to me for evaluation. Below are the relevant portions of my assessment and plan of care.       1. Attention-deficit hyperactivity disorder, predominantly hyperactive type    2. Generalized anxiety disorder    3. Idiopathic aplastic anemia    4. Smoking addiction         Mr. Anderson has a history of severe aplastic anemia now off CSA since 10/2016 and in partial remission with counts 3/2/17 showing WBC 3K (ANC 1.3K, down somewhat), hemoglobin 12.7 (stable) and platelet count 66K (stable). He has cut his alcohol consumption to one drink every 2-3 days which also affects his blood counts.  He will try to stop that entirely.    [facility] study on 12/30/16 showed a population of PIG-A deficient cells (3.3% RBC, 11% granulocytes and monocytes) which we will need to follow over time.  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  LDH up somewhat at 262 (though down from last month at 289) and if this trend continues in 6 weeks I will recheck his PNH population.    He will try to stay off cigarettes and is off Chantix as it made him feel weird; now on about 5 cigarettes a day.  Now on no medication for this but he understands the importance of quitting with a target of next week.  Does not want to join a group for support.    I again discussed with him the potential future complications of his aplastic anemia including relapsing of his aplasia and development of clonal issues including PNH (see above), MDS and AML.  Should he relapse his aplasia he would be a candidate for  other immunosuppression with agents like rabbit ATG and alemtuzumab or consideration of MUD allogeneic transplant. There would be other considerations should he develop a different clonal disorder. He will follow up every 2 months.    He has ongoing hypertension with weight gain and he will continue amlodipine 10 mg po daily and variable use of dyazide. He will follow up with Dr. Adorno for this now that he is off CSA. His renal and hepatic function are normal today. He is trying to exercise 3-4 days a week now and understands the importance of weight loss to his blood pressure.    All of his questions were answered in the clinic today.        If you have questions, please do not hesitate to call me. I look forward to following Ken along with you.    Sincerely,      Kp Bellamy MD           CC  No Recipients
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
## 2017-04-12 ENCOUNTER — PATIENT MESSAGE (OUTPATIENT)
Dept: PSYCHIATRY | Facility: CLINIC | Age: 32
End: 2017-04-12

## 2017-04-12 DIAGNOSIS — F90.0 ATTENTION DEFICIT HYPERACTIVITY DISORDER (ADHD), PREDOMINANTLY INATTENTIVE TYPE: ICD-10-CM

## 2017-04-12 RX ORDER — LISDEXAMFETAMINE DIMESYLATE 50 MG/1
50 CAPSULE ORAL EVERY MORNING
Qty: 30 CAPSULE | Refills: 0 | Status: SHIPPED | OUTPATIENT
Start: 2017-04-12 | End: 2017-05-11 | Stop reason: SDUPTHER

## 2017-05-10 ENCOUNTER — PATIENT MESSAGE (OUTPATIENT)
Dept: PSYCHIATRY | Facility: CLINIC | Age: 32
End: 2017-05-10

## 2017-05-10 ENCOUNTER — OFFICE VISIT (OUTPATIENT)
Dept: NEPHROLOGY | Facility: CLINIC | Age: 32
End: 2017-05-10
Payer: COMMERCIAL

## 2017-05-10 VITALS
HEART RATE: 108 BPM | HEIGHT: 76 IN | DIASTOLIC BLOOD PRESSURE: 100 MMHG | SYSTOLIC BLOOD PRESSURE: 140 MMHG | WEIGHT: 315 LBS | BODY MASS INDEX: 38.36 KG/M2 | OXYGEN SATURATION: 97 %

## 2017-05-10 DIAGNOSIS — I10 HYPERTENSION NOT AT GOAL: Primary | ICD-10-CM

## 2017-05-10 DIAGNOSIS — F90.0 ATTENTION DEFICIT HYPERACTIVITY DISORDER (ADHD), PREDOMINANTLY INATTENTIVE TYPE: ICD-10-CM

## 2017-05-10 DIAGNOSIS — Z72.0 TOBACCO ABUSE DISORDER: ICD-10-CM

## 2017-05-10 PROCEDURE — 99999 PR PBB SHADOW E&M-EST. PATIENT-LVL III: CPT | Mod: PBBFAC,,, | Performed by: INTERNAL MEDICINE

## 2017-05-10 PROCEDURE — 1160F RVW MEDS BY RX/DR IN RCRD: CPT | Mod: S$GLB,,, | Performed by: INTERNAL MEDICINE

## 2017-05-10 PROCEDURE — 3080F DIAST BP >= 90 MM HG: CPT | Mod: S$GLB,,, | Performed by: INTERNAL MEDICINE

## 2017-05-10 PROCEDURE — 3077F SYST BP >= 140 MM HG: CPT | Mod: S$GLB,,, | Performed by: INTERNAL MEDICINE

## 2017-05-10 PROCEDURE — 99214 OFFICE O/P EST MOD 30 MIN: CPT | Mod: S$GLB,,, | Performed by: INTERNAL MEDICINE

## 2017-05-10 NOTE — PROGRESS NOTES
"Subjective:       Patient ID: Ken Anderson is a 31 y.o. White male who presents for follow-up evaluation of Hypertension    Hypertension   Pertinent negatives include no chest pain, headaches or shortness of breath.        Mr. Anderson was seen in nephrology clinic for follow up on hypertension. He was previously followed by Dr. King and was last seen by him on 2/24/16. He established care with me on 8/24/16 and he is back today for follow up. Pt has aplastic anemia for which he was on Cyclosporine and was suspected to develop hypertension as a result. He has ADHD, anxiety, depression and has been on treatment for these problems. He takes amlodipine. Pt has gained weight since his last nephrology visit (from 284 to 296 lbs until last year and now 320 lbs). He has morbid obesity. He states he does not take Dyazide at present as he was told by other physicians to stop taking it.    He brought his home BP cuff but did not bring any home BP readings. He describes his blood pressure could go upto 160 at times if he does not take his medicine. On further questioning he reports he has been taken off Cyclosporine. However he has continues to smoke tobacco and has been doing so every day for a long time. He also admits he has been having problems with alcohol and has been diagnosed with alcoholism and is actively trying to control his habits. He does not exercise much to none at present. He reports he has started to see a dietician but he thinks " it is difficult to avoid boxed food". On further questioning he admits to drinking 3 cups of coffee per day (he drinks regular coffee). He has sleep apnea and he reports he uses CPAP regularly.     He denies any leg swelling/ diaphoresis/ chest pain/ shortness of breath. Labs from 4/5/17 noted for Na 140, K 4.3, bicarbonate 24, BUN 23, creatinine 1.0, eGFR > 60, Ca 9.4, wbc 2.9, Hb 12.7, platelet 66.     Review of Systems   Constitutional: Negative for activity change, " appetite change, chills, fatigue and fever.   HENT: Negative for sneezing and sore throat.    Eyes: Negative for visual disturbance.   Respiratory: Negative for cough, shortness of breath and wheezing.    Cardiovascular: Negative for chest pain and leg swelling.   Gastrointestinal: Negative for abdominal pain, diarrhea, nausea and vomiting.   Endocrine: Negative for polyuria.   Genitourinary: Negative for dysuria, flank pain, frequency and hematuria.   Musculoskeletal: Negative for back pain and myalgias.   Skin: Negative for pallor and rash.   Neurological: Negative for dizziness, light-headedness and headaches.   Psychiatric/Behavioral: Positive for decreased concentration. The patient is hyperactive.        Objective:      Physical Exam   Constitutional: He is oriented to person, place, and time. He appears well-developed. No distress.   HENT:   Mouth/Throat: Oropharynx is clear and moist.   Eyes: Right eye exhibits no discharge. Left eye exhibits no discharge.   Neck: Neck supple.   Cardiovascular: Normal rate, regular rhythm and normal heart sounds.    No murmur heard.  Pulmonary/Chest: Effort normal and breath sounds normal. No respiratory distress. He has no wheezes. He has no rales.   Abdominal: Soft. There is no tenderness. There is no guarding.   obese   Musculoskeletal: He exhibits no edema.   Neurological: He is alert and oriented to person, place, and time.   Skin: Skin is warm and dry. He is not diaphoretic. No erythema.   Psychiatric: He has a normal mood and affect.   Vitals reviewed.      Assessment:       1. Hypertension not at goal    2. BMI 38.0-38.9,adult    3. Tobacco abuse disorder        Plan:     Ken has difficulty controlling his blood pressure. He has chosen to remain on monotherapy for his hypertension control and does not wish to restart taking Dyazide for now. He has multiple risk factors identified including his worsening obesity and continued weight gain, non compliance with low  salt diet, excessive intake of coffee, ongoing tobacco smoking and alcoholism, sleep apnea. I explained to him actually previously his hypertension onset was suspected from Cyclosporine use but he has been taken off it and has still sub optimal control which is likely related to these factors. He expressed understanding and agreed to follow the plan. I have advised him to send home BP readings over next 1-2 weeks as if BP still elevated at home then he will likely need to resume Dyazide and in that case we will have to set up labs to follow electrolytes more closely. He agreed with this plan.    - stop smoking tobacco, pt counseled  - he is getting assistance with alcoholism  - weight loss by aerobic exercise, decreased calorie consumption  - avoid excessive intake of coffee  - strict low salt diet  - DASH diet, discussed with him  - 30 minutes of physical aerobic exercise 5 times per week  - continue home BP monitoring    Plan d/w him.   RTC 3 months.

## 2017-05-11 RX ORDER — LISDEXAMFETAMINE DIMESYLATE 50 MG/1
50 CAPSULE ORAL EVERY MORNING
Qty: 30 CAPSULE | Refills: 0 | Status: SHIPPED | OUTPATIENT
Start: 2017-05-11 | End: 2017-06-07 | Stop reason: SDUPTHER

## 2017-05-18 ENCOUNTER — PATIENT MESSAGE (OUTPATIENT)
Dept: DERMATOLOGY | Facility: CLINIC | Age: 32
End: 2017-05-18

## 2017-05-18 DIAGNOSIS — I10 ESSENTIAL HYPERTENSION: ICD-10-CM

## 2017-05-18 DIAGNOSIS — D84.9 IMMUNOSUPPRESSION: ICD-10-CM

## 2017-05-18 RX ORDER — AMLODIPINE BESYLATE 10 MG/1
TABLET ORAL
Qty: 90 TABLET | Refills: 0 | Status: SHIPPED | OUTPATIENT
Start: 2017-05-18 | End: 2017-06-30 | Stop reason: SDUPTHER

## 2017-05-19 DIAGNOSIS — D84.9 IMMUNOSUPPRESSION: ICD-10-CM

## 2017-05-19 RX ORDER — VALACYCLOVIR HYDROCHLORIDE 1 G/1
1000 TABLET, FILM COATED ORAL EVERY 12 HOURS
Qty: 180 TABLET | Refills: 1 | OUTPATIENT
Start: 2017-05-19

## 2017-05-19 RX ORDER — VALACYCLOVIR HYDROCHLORIDE 1 G/1
1000 TABLET, FILM COATED ORAL EVERY 12 HOURS
Qty: 12 TABLET | Refills: 1 | Status: SHIPPED | OUTPATIENT
Start: 2017-05-19 | End: 2018-05-25 | Stop reason: SDUPTHER

## 2017-06-05 ENCOUNTER — TELEPHONE (OUTPATIENT)
Dept: HEMATOLOGY/ONCOLOGY | Facility: CLINIC | Age: 32
End: 2017-06-05

## 2017-06-05 ENCOUNTER — LAB VISIT (OUTPATIENT)
Dept: LAB | Facility: HOSPITAL | Age: 32
End: 2017-06-05
Attending: INTERNAL MEDICINE
Payer: COMMERCIAL

## 2017-06-05 DIAGNOSIS — D61.3 IDIOPATHIC APLASTIC ANEMIA: Primary | ICD-10-CM

## 2017-06-05 DIAGNOSIS — D61.3 IDIOPATHIC APLASTIC ANEMIA: ICD-10-CM

## 2017-06-05 LAB
ALBUMIN SERPL BCP-MCNC: 3.8 G/DL
ALP SERPL-CCNC: 99 U/L
ALT SERPL W/O P-5'-P-CCNC: 24 U/L
ANION GAP SERPL CALC-SCNC: 10 MMOL/L
AST SERPL-CCNC: 19 U/L
BASOPHILS # BLD AUTO: 0.02 K/UL
BASOPHILS NFR BLD: 0.5 %
BILIRUB SERPL-MCNC: 0.4 MG/DL
BUN SERPL-MCNC: 18 MG/DL
CALCIUM SERPL-MCNC: 9.3 MG/DL
CHLORIDE SERPL-SCNC: 107 MMOL/L
CO2 SERPL-SCNC: 23 MMOL/L
CREAT SERPL-MCNC: 0.9 MG/DL
DIFFERENTIAL METHOD: ABNORMAL
EOSINOPHIL # BLD AUTO: 0.1 K/UL
EOSINOPHIL NFR BLD: 2.7 %
ERYTHROCYTE [DISTWIDTH] IN BLOOD BY AUTOMATED COUNT: 15.4 %
EST. GFR  (AFRICAN AMERICAN): >60 ML/MIN/1.73 M^2
EST. GFR  (NON AFRICAN AMERICAN): >60 ML/MIN/1.73 M^2
GLUCOSE SERPL-MCNC: 104 MG/DL
HCT VFR BLD AUTO: 36.7 %
HGB BLD-MCNC: 12.9 G/DL
LDH SERPL L TO P-CCNC: 311 U/L
LYMPHOCYTES # BLD AUTO: 1.6 K/UL
LYMPHOCYTES NFR BLD: 38.3 %
MCH RBC QN AUTO: 35.8 PG
MCHC RBC AUTO-ENTMCNC: 35.1 %
MCV RBC AUTO: 102 FL
MONOCYTES # BLD AUTO: 0.5 K/UL
MONOCYTES NFR BLD: 12.1 %
NEUTROPHILS # BLD AUTO: 1.9 K/UL
NEUTROPHILS NFR BLD: 46.2 %
PLATELET # BLD AUTO: 54 K/UL
PMV BLD AUTO: 8.7 FL
POTASSIUM SERPL-SCNC: 3.9 MMOL/L
PROT SERPL-MCNC: 7.7 G/DL
RBC # BLD AUTO: 3.6 M/UL
SODIUM SERPL-SCNC: 140 MMOL/L
WBC # BLD AUTO: 4.13 K/UL

## 2017-06-05 PROCEDURE — 88184 FLOWCYTOMETRY/ TC 1 MARKER: CPT

## 2017-06-05 PROCEDURE — 85025 COMPLETE CBC W/AUTO DIFF WBC: CPT

## 2017-06-05 PROCEDURE — 88188 FLOWCYTOMETRY/READ 9-15: CPT

## 2017-06-05 PROCEDURE — 36415 COLL VENOUS BLD VENIPUNCTURE: CPT

## 2017-06-05 PROCEDURE — 80053 COMPREHEN METABOLIC PANEL: CPT

## 2017-06-05 PROCEDURE — 83615 LACTATE (LD) (LDH) ENZYME: CPT

## 2017-06-05 NOTE — TELEPHONE ENCOUNTER
----- Message from Fiordaliza Hebert sent at 6/5/2017  8:43 AM CDT -----  Patient is calling to schedule lab work. He can be reached at [phone].      Spoke with patient he stated he wanted to come in tosee Dr. Bellamy today and labs I explained to him that Dr. Bellamy did not have any openings today but I will ask  if he can see the patient today, scheduled patient for labs that he cancelled twice.

## 2017-06-07 ENCOUNTER — PATIENT MESSAGE (OUTPATIENT)
Dept: PSYCHIATRY | Facility: CLINIC | Age: 32
End: 2017-06-07

## 2017-06-07 DIAGNOSIS — F90.0 ATTENTION DEFICIT HYPERACTIVITY DISORDER (ADHD), PREDOMINANTLY INATTENTIVE TYPE: ICD-10-CM

## 2017-06-07 LAB
FLOW CYTOMETRY SPECIALIST REVIEW: ABNORMAL
PNH GRANULOCYTES: 10.37 % (ref 0–0.01)
PNH MONOCYTES: 14.02 % (ref 0–0.05)
PNH RBC-COMPLETE AG LOSS: 3.37 % (ref 0–0.01)
PNH RBC-PARTIAL AG LOSS: 0.02 % (ref 0–0.99)

## 2017-06-07 RX ORDER — LISDEXAMFETAMINE DIMESYLATE 50 MG/1
50 CAPSULE ORAL EVERY MORNING
Qty: 30 CAPSULE | Refills: 0 | Status: SHIPPED | OUTPATIENT
Start: 2017-06-07 | End: 2017-07-07

## 2017-06-07 RX ORDER — LISDEXAMFETAMINE DIMESYLATE 50 MG/1
50 CAPSULE ORAL EVERY MORNING
Qty: 30 CAPSULE | Refills: 0 | Status: SHIPPED | OUTPATIENT
Start: 2017-07-06 | End: 2017-08-17 | Stop reason: SDUPTHER

## 2017-06-30 DIAGNOSIS — I10 ESSENTIAL HYPERTENSION: ICD-10-CM

## 2017-06-30 DIAGNOSIS — F41.9 ANXIETY: ICD-10-CM

## 2017-06-30 RX ORDER — AMLODIPINE BESYLATE 10 MG/1
10 TABLET ORAL DAILY
Qty: 90 TABLET | Refills: 0 | Status: SHIPPED | OUTPATIENT
Start: 2017-06-30 | End: 2017-07-03

## 2017-06-30 RX ORDER — LORAZEPAM 1 MG/1
TABLET ORAL
Qty: 30 TABLET | Refills: 0 | Status: SHIPPED | OUTPATIENT
Start: 2017-06-30 | End: 2017-08-12 | Stop reason: SDUPTHER

## 2017-06-30 NOTE — TELEPHONE ENCOUNTER
----- Message from Karen Lugo sent at 6/30/2017  2:24 PM CDT -----  Contact: self [phone]  Type: Rx    Name of medication(s):  amlodipine (NORVASC) 10 MG tablet    Is this a refill? New rx? Refill      Who prescribed medication?    Pharmacy Name, Phone, & Location: [facility] on file     Comments: patient stated he is leaving on Sunday for vacation . Requesting medication today . Please call and advise, Thanks !

## 2017-07-03 RX ORDER — AMLODIPINE BESYLATE 10 MG/1
TABLET ORAL
Qty: 90 TABLET | Refills: 0 | Status: SHIPPED | OUTPATIENT
Start: 2017-07-03 | End: 2017-11-20 | Stop reason: SDUPTHER

## 2017-07-27 ENCOUNTER — PATIENT MESSAGE (OUTPATIENT)
Dept: NEPHROLOGY | Facility: CLINIC | Age: 32
End: 2017-07-27

## 2017-08-12 DIAGNOSIS — F41.9 ANXIETY: ICD-10-CM

## 2017-08-12 DIAGNOSIS — F32.5 MAJOR DEPRESSIVE DISORDER WITH SINGLE EPISODE, IN FULL REMISSION: ICD-10-CM

## 2017-08-14 RX ORDER — LORAZEPAM 1 MG/1
TABLET ORAL
Qty: 30 TABLET | Refills: 0 | Status: SHIPPED | OUTPATIENT
Start: 2017-08-14 | End: 2017-09-28 | Stop reason: SDUPTHER

## 2017-08-16 ENCOUNTER — PATIENT MESSAGE (OUTPATIENT)
Dept: PSYCHIATRY | Facility: CLINIC | Age: 32
End: 2017-08-16

## 2017-08-16 ENCOUNTER — PATIENT MESSAGE (OUTPATIENT)
Dept: NEPHROLOGY | Facility: CLINIC | Age: 32
End: 2017-08-16

## 2017-08-17 ENCOUNTER — TELEPHONE (OUTPATIENT)
Dept: PSYCHIATRY | Facility: CLINIC | Age: 32
End: 2017-08-17

## 2017-08-17 ENCOUNTER — TELEPHONE (OUTPATIENT)
Dept: INTERNAL MEDICINE | Facility: CLINIC | Age: 32
End: 2017-08-17

## 2017-08-17 DIAGNOSIS — F90.0 ATTENTION DEFICIT HYPERACTIVITY DISORDER (ADHD), PREDOMINANTLY INATTENTIVE TYPE: ICD-10-CM

## 2017-08-17 RX ORDER — LISDEXAMFETAMINE DIMESYLATE 50 MG/1
50 CAPSULE ORAL EVERY MORNING
Qty: 30 CAPSULE | Refills: 0 | Status: SHIPPED | OUTPATIENT
Start: 2017-08-17 | End: 2017-09-20

## 2017-08-17 NOTE — TELEPHONE ENCOUNTER
----- Message from Lynda Stone MA sent at 8/17/2017 11:07 AM CDT -----  Contact: self - [phone]   Patient is requesting to get a New Rx for lisdexamfetamine (VYVANSE) 50 MG capsule from Dr Adorno because his doctor has moved and his next appt isnt until Sept. Please call. Thanks!    Pharmacy: [facility] [address] [place] [address] [address]

## 2017-08-18 DIAGNOSIS — F32.5 MAJOR DEPRESSIVE DISORDER WITH SINGLE EPISODE, IN FULL REMISSION: ICD-10-CM

## 2017-08-29 DIAGNOSIS — F32.5 MAJOR DEPRESSIVE DISORDER WITH SINGLE EPISODE, IN FULL REMISSION: ICD-10-CM

## 2017-09-12 ENCOUNTER — TELEPHONE (OUTPATIENT)
Dept: INTERNAL MEDICINE | Facility: CLINIC | Age: 32
End: 2017-09-12

## 2017-09-12 NOTE — TELEPHONE ENCOUNTER
"Called pt to recieve more details on the "eye pain" and pt states that he does not have a headache or any other underlying issues, informed me that he was had this issue in the past and he waited too long which led to "vision damage" and pt states that he will go to the ED because he does not want to risk any other health issues.   "

## 2017-09-15 RX ORDER — CITALOPRAM 40 MG/1
TABLET, FILM COATED ORAL
Qty: 90 TABLET | Refills: 0 | OUTPATIENT
Start: 2017-09-15

## 2017-09-20 ENCOUNTER — OFFICE VISIT (OUTPATIENT)
Dept: DERMATOLOGY | Facility: CLINIC | Age: 32
End: 2017-09-20
Payer: COMMERCIAL

## 2017-09-20 DIAGNOSIS — Z12.83 SKIN CANCER SCREENING: ICD-10-CM

## 2017-09-20 DIAGNOSIS — B07.9 VIRAL WARTS, UNSPECIFIED TYPE: Primary | ICD-10-CM

## 2017-09-20 DIAGNOSIS — D22.9 MULTIPLE BENIGN NEVI: ICD-10-CM

## 2017-09-20 PROCEDURE — 99999 PR PBB SHADOW E&M-EST. PATIENT-LVL II: CPT | Mod: PBBFAC,,, | Performed by: DERMATOLOGY

## 2017-09-20 PROCEDURE — 3008F BODY MASS INDEX DOCD: CPT | Mod: S$GLB,,, | Performed by: DERMATOLOGY

## 2017-09-20 PROCEDURE — 99213 OFFICE O/P EST LOW 20 MIN: CPT | Mod: 25,S$GLB,, | Performed by: DERMATOLOGY

## 2017-09-20 PROCEDURE — 17110 DESTRUCTION B9 LES UP TO 14: CPT | Mod: S$GLB,,, | Performed by: DERMATOLOGY

## 2017-09-20 NOTE — PROGRESS NOTES
Subjective:       Patient ID:  Ken Anderson is a 32 y.o. male who presents for   Chief Complaint   Patient presents with    Skin Check     UBSE    Warts     right thumb     Warts  - Initial  Affected locations: right fingers  Duration: 2 months  Signs / symptoms: tender  Severity: mild to moderate  Timing: constant  Aggravated by: nothing  Relieving factors/Treatments tried: nothing  Improvement on treatment: no relief      Interested in upper body skin check today.  No personal history of skin cancer or atypical moles.      Review of Systems   Constitutional: Negative for fever, chills, weight loss, weight gain, fatigue, night sweats and malaise.   Skin: Negative for daily sunscreen use and recent sunburn.   Hematologic/Lymphatic: Bruises/bleeds easily.        Objective:    Physical Exam   Constitutional: He appears well-developed and well-nourished. No distress.   Neurological: He is alert and oriented to person, place, and time. He is not disoriented.   Psychiatric: He has a normal mood and affect.   Skin:   Areas Examined (abnormalities noted in diagram):   Head / Face Inspection Performed  Neck Inspection Performed  Chest / Axilla Inspection Performed  Back Inspection Performed  RUE Inspected  LUE Inspection Performed  Nails and Digits Inspection Performed                  Diagram Legend     Erythematous scaling macule/papule c/w actinic keratosis       Vascular papule c/w angioma      Pigmented verrucoid papule/plaque c/w seborrheic keratosis      Yellow umbilicated papule c/w sebaceous hyperplasia      Irregularly shaped tan macule c/w lentigo     1-2 mm smooth white papules consistent with Milia      Movable subcutaneous cyst with punctum c/w epidermal inclusion cyst      Subcutaneous movable cyst c/w pilar cyst      Firm pink to brown papule c/w dermatofibroma      Pedunculated fleshy papule(s) c/w skin tag(s)      Evenly pigmented macule c/w junctional nevus     Mildly variegated pigmented,  slightly irregular-bordered macule c/w mildly atypical nevus      Flesh colored to evenly pigmented papule c/w intradermal nevus       Pink pearly papule/plaque c/w basal cell carcinoma      Erythematous hyperkeratotic cursted plaque c/w SCC      Surgical scar with no sign of skin cancer recurrence      Open and closed comedones      Inflammatory papules and pustules      Verrucoid papule consistent consistent with wart     Erythematous eczematous patches and plaques     Dystrophic onycholytic nail with subungual debris c/w onychomycosis     Umbilicated papule    Erythematous-base heme-crusted tan verrucoid plaque consistent with inflamed seborrheic keratosis     Erythematous Silvery Scaling Plaque c/w Psoriasis     See annotation      Assessment / Plan:        Viral warts, unspecified type  Cryosurgery procedure note:    Verbal consent from the patient is obtained. Liquid nitrogen cryosurgery is applied to 4 lesions to produce a freeze injury. The patient is aware that blisters may form and is instructed on wound care with gentle cleansing and use of vaseline ointment to keep moist until healed. The patient is supplied a handout on cryosurgery and is instructed to call if lesions do not completely resolve.    Multiple benign nevi  Benign-appearing on exam today. Counseled pt to monitor mole(s) and return to clinic if any changes noted or symptoms (bleeding, itching, pain, etc) noted. Brochure provided.    Skin cancer screening  Upper body skin examination performed today including at least 6 points as noted in physical examination. No lesions suspicious for malignancy noted.  Patient instructed in importance of daily broad spectrum sunscreen use with spf at least 30. Sun avoidance and topical protection/protective clothing discussed.      Return for skin check or sooner for any concerns.

## 2017-09-20 NOTE — PATIENT INSTRUCTIONS
CRYOSURGERY      Your doctor has used a method called cryosurgery to treat your skin condition. Cryosurgery refers to the use of very cold substances to treat a variety of skin conditions such as warts, pre-skin cancers, molluscum contagiosum, sun spots, and several benign growths. The substance we use in cryosurgery is liquid nitrogen and is so cold (-195 degrees Celsius) that is burns when administered.     Following treatment in the office, the skin may immediately burn and become red. You may find the area around the lesion is affected as well. It is sometimes necessary to treat not only the lesion, but a small area of the surrounding normal skin to achieve a good response.     A blister, and even a blood filled blister, may form after treatment.   This is a normal response. If the blister is painful, it is acceptable to sterilize a needle and with rubbing alcohol and gently pop the blister. It is important that you gently wash the area with soap and warm water as the blister fluid may contain wart virus if a wart was treated. Do no remove the roof of the blister.     The area treated can take anywhere from 1-3 weeks to heal. Healing time depends on the kind skin lesion treated, the location, and how aggressively the lesion was treated. It is recommended that the areas treated are covered with Vaseline or bacitracin ointment and a band-aid. If a band-aid is not practical, just ointment applied several times per day will do. Keeping these areas moist will speed the healing time.    Treatment with liquid nitrogen can leave a scar. In dark skin, it may be a light or dark scar, in light skin it may be a white or pink scar. These will generally fade with time, but may never go away completely.     If you have any concerns after your treatment, please feel free to call the office.       [address] [place] [address]/ [phone] FAX/ [email or web address]    Summer Sun Protection      The  [facility] would like to remind you of the importance of sun protection all year round and particularly during the summer when the suns rays are the strongest. It has been proven that both acute and chronic sun exposure damages our cells and leads to skin cancer. Beyond skin cancer, the sun causes 90% of the symptoms of pre-mature skin aging, including wrinkles, lentigines (brown spots), and thin, easily bruised skin. Proper sun protection can help prevent these unwanted conditions.    Many patients report that the dont go in the sun. It has been shown that the average person receives 18 hours of incidental sun exposure per week during activities such as walking through parking lots, driving, or sitting next to windows. This accumulates to several bad sunburns per year!    In choosing sunscreen, you want one that protects against both UVA and UVB rays. It is recommended that you use one of SPF 30 or higher. It is important to apply the sunscreen about 20 minutes prior to sun exposure. Most sunscreens are chemical sunscreens and a reaction must take place in the skin so that they are effective. If they are applied and then you are immediately exposed to the sun or start sweating, this reaction has not had time to take place and you are therefore unprotected. Sunscreen needs to be reapplied every 2 hours if you are participating in water sports or sweating. We recommend Elta MD or Neutrogena Ultra Sheer Dry Touch SPF 55 for daily use; however there are many options and it is most important for you to find one that you will use on a consistent basis.    If you have sensitive skin, you may do best with a sunscreen that contains only physical blockers such as titanium dioxide or zinc oxide. These are typically thicker and harder to apply, however they afford very good protection. Neutrogena Sensitive Skin, Blue Lizard Sensitive Skin (pink top) or Neutrogena Pure and Free are popular ones.      Aside from sunscreen, clothes with UV protection, wide brimmed hats, and sunglasses are other means of sun protection that we recommend.                        [facility]  [address]  [facility] [address]  Dept: [phone]  Dept Fax: [phone]

## 2017-09-22 ENCOUNTER — OFFICE VISIT (OUTPATIENT)
Dept: OPTOMETRY | Facility: CLINIC | Age: 32
End: 2017-09-22
Payer: COMMERCIAL

## 2017-09-22 DIAGNOSIS — H52.7 REFRACTIVE ERROR: ICD-10-CM

## 2017-09-22 DIAGNOSIS — H16.143 PUNCTATE KERATITIS OF BOTH EYES: Primary | ICD-10-CM

## 2017-09-22 PROCEDURE — 92015 DETERMINE REFRACTIVE STATE: CPT | Mod: S$GLB,,, | Performed by: OPTOMETRIST

## 2017-09-22 PROCEDURE — 99999 PR PBB SHADOW E&M-EST. PATIENT-LVL II: CPT | Mod: PBBFAC,,, | Performed by: OPTOMETRIST

## 2017-09-22 PROCEDURE — 92012 INTRM OPH EXAM EST PATIENT: CPT | Mod: S$GLB,,, | Performed by: OPTOMETRIST

## 2017-09-22 NOTE — PROGRESS NOTES
BILLIE GIRALDO about 1 week ago.  Told he had bacterial conjunctivitis OS, given   maxitrol to use QID OU for 6 days.  Pt feel that vision is  still blurred   OS no other symptoms. Has improved but would like follow up to be sure.    Last edited by Cathy Issa, OD on 9/22/2017  2:50 PM. (History)            Assessment /Plan     For exam results, see Encounter Report.    Punctate keratitis of both eyes    Refractive error      1. Resolved eye infection. OK to stop med drops. BCVA OS is stable at 20/25 with findings at last exam on 3/26/2015 per . No active infection at this time. Educated pt on today's findings and recommended the use of AT's OU at least tid to help with dry eyes. RTC in 1 week if problems continue or prn.  2. Spec Rx given. Different lens options discussed with patient. RTC 1 year full exam.

## 2017-09-28 ENCOUNTER — LAB VISIT (OUTPATIENT)
Dept: LAB | Facility: HOSPITAL | Age: 32
End: 2017-09-28
Attending: INTERNAL MEDICINE
Payer: COMMERCIAL

## 2017-09-28 ENCOUNTER — TELEPHONE (OUTPATIENT)
Dept: NEPHROLOGY | Facility: CLINIC | Age: 32
End: 2017-09-28

## 2017-09-28 ENCOUNTER — TELEPHONE (OUTPATIENT)
Dept: HEMATOLOGY/ONCOLOGY | Facility: CLINIC | Age: 32
End: 2017-09-28

## 2017-09-28 ENCOUNTER — OFFICE VISIT (OUTPATIENT)
Dept: PSYCHIATRY | Facility: CLINIC | Age: 32
End: 2017-09-28
Payer: COMMERCIAL

## 2017-09-28 VITALS
SYSTOLIC BLOOD PRESSURE: 167 MMHG | BODY MASS INDEX: 38.36 KG/M2 | DIASTOLIC BLOOD PRESSURE: 91 MMHG | HEIGHT: 76 IN | WEIGHT: 315 LBS | HEART RATE: 94 BPM

## 2017-09-28 DIAGNOSIS — I10 HYPERTENSION NOT AT GOAL: ICD-10-CM

## 2017-09-28 DIAGNOSIS — D61.3 IDIOPATHIC APLASTIC ANEMIA: ICD-10-CM

## 2017-09-28 DIAGNOSIS — R89.9 ABNORMAL LABORATORY TEST: Primary | ICD-10-CM

## 2017-09-28 DIAGNOSIS — F32.5 MAJOR DEPRESSIVE DISORDER WITH SINGLE EPISODE, IN FULL REMISSION: ICD-10-CM

## 2017-09-28 DIAGNOSIS — D61.9 ANEMIA DUE TO BONE MARROW FAILURE: ICD-10-CM

## 2017-09-28 DIAGNOSIS — F41.9 ANXIETY: Primary | ICD-10-CM

## 2017-09-28 DIAGNOSIS — F90.0 ATTENTION DEFICIT HYPERACTIVITY DISORDER (ADHD), PREDOMINANTLY INATTENTIVE TYPE: ICD-10-CM

## 2017-09-28 DIAGNOSIS — F32.5 MAJOR DEPRESSION IN REMISSION: ICD-10-CM

## 2017-09-28 LAB
ALBUMIN SERPL BCP-MCNC: 3.5 G/DL
ALBUMIN SERPL BCP-MCNC: 3.5 G/DL
ALP SERPL-CCNC: 96 U/L
ALT SERPL W/O P-5'-P-CCNC: 22 U/L
ANION GAP SERPL CALC-SCNC: 8 MMOL/L
ANION GAP SERPL CALC-SCNC: 8 MMOL/L
AST SERPL-CCNC: 15 U/L
BASOPHILS # BLD AUTO: 0.02 K/UL
BASOPHILS NFR BLD: 0.6 %
BILIRUB SERPL-MCNC: 0.4 MG/DL
BUN SERPL-MCNC: 18 MG/DL
BUN SERPL-MCNC: 18 MG/DL
CALCIUM SERPL-MCNC: 9.2 MG/DL
CALCIUM SERPL-MCNC: 9.2 MG/DL
CHLORIDE SERPL-SCNC: 108 MMOL/L
CHLORIDE SERPL-SCNC: 108 MMOL/L
CO2 SERPL-SCNC: 24 MMOL/L
CO2 SERPL-SCNC: 24 MMOL/L
CREAT SERPL-MCNC: 0.9 MG/DL
CREAT SERPL-MCNC: 0.9 MG/DL
DIFFERENTIAL METHOD: ABNORMAL
EOSINOPHIL # BLD AUTO: 0.1 K/UL
EOSINOPHIL NFR BLD: 1.4 %
ERYTHROCYTE [DISTWIDTH] IN BLOOD BY AUTOMATED COUNT: 15 %
EST. GFR  (AFRICAN AMERICAN): >60 ML/MIN/1.73 M^2
EST. GFR  (AFRICAN AMERICAN): >60 ML/MIN/1.73 M^2
EST. GFR  (NON AFRICAN AMERICAN): >60 ML/MIN/1.73 M^2
EST. GFR  (NON AFRICAN AMERICAN): >60 ML/MIN/1.73 M^2
GLUCOSE SERPL-MCNC: 120 MG/DL
GLUCOSE SERPL-MCNC: 120 MG/DL
HCT VFR BLD AUTO: 35.2 %
HGB BLD-MCNC: 12.3 G/DL
LYMPHOCYTES # BLD AUTO: 1.4 K/UL
LYMPHOCYTES NFR BLD: 38.4 %
MCH RBC QN AUTO: 34.9 PG
MCHC RBC AUTO-ENTMCNC: 34.9 G/DL
MCV RBC AUTO: 100 FL
MONOCYTES # BLD AUTO: 0.4 K/UL
MONOCYTES NFR BLD: 11.9 %
NEUTROPHILS # BLD AUTO: 1.7 K/UL
NEUTROPHILS NFR BLD: 47.4 %
PHOSPHATE SERPL-MCNC: 1.6 MG/DL
PLATELET # BLD AUTO: 61 K/UL
PMV BLD AUTO: 9.6 FL
POTASSIUM SERPL-SCNC: 4 MMOL/L
POTASSIUM SERPL-SCNC: 4 MMOL/L
PROT SERPL-MCNC: 7.3 G/DL
RBC # BLD AUTO: 3.52 M/UL
SODIUM SERPL-SCNC: 140 MMOL/L
SODIUM SERPL-SCNC: 140 MMOL/L
WBC # BLD AUTO: 3.54 K/UL

## 2017-09-28 PROCEDURE — 3077F SYST BP >= 140 MM HG: CPT | Mod: S$GLB,,, | Performed by: NURSE PRACTITIONER

## 2017-09-28 PROCEDURE — 90833 PSYTX W PT W E/M 30 MIN: CPT | Mod: S$GLB,,, | Performed by: NURSE PRACTITIONER

## 2017-09-28 PROCEDURE — 99999 PR PBB SHADOW E&M-EST. PATIENT-LVL II: CPT | Mod: PBBFAC,,, | Performed by: NURSE PRACTITIONER

## 2017-09-28 PROCEDURE — 99214 OFFICE O/P EST MOD 30 MIN: CPT | Mod: S$GLB,,, | Performed by: NURSE PRACTITIONER

## 2017-09-28 PROCEDURE — 3008F BODY MASS INDEX DOCD: CPT | Mod: S$GLB,,, | Performed by: NURSE PRACTITIONER

## 2017-09-28 PROCEDURE — 3080F DIAST BP >= 90 MM HG: CPT | Mod: S$GLB,,, | Performed by: NURSE PRACTITIONER

## 2017-09-28 PROCEDURE — 80053 COMPREHEN METABOLIC PANEL: CPT

## 2017-09-28 PROCEDURE — 36415 COLL VENOUS BLD VENIPUNCTURE: CPT

## 2017-09-28 PROCEDURE — 80069 RENAL FUNCTION PANEL: CPT

## 2017-09-28 PROCEDURE — 85025 COMPLETE CBC W/AUTO DIFF WBC: CPT

## 2017-09-28 RX ORDER — CITALOPRAM 40 MG/1
40 TABLET, FILM COATED ORAL DAILY
Qty: 90 TABLET | Refills: 1 | Status: SHIPPED | OUTPATIENT
Start: 2017-09-28 | End: 2018-01-08 | Stop reason: SDUPTHER

## 2017-09-28 RX ORDER — LORAZEPAM 1 MG/1
TABLET ORAL
Qty: 30 TABLET | Refills: 5 | Status: SHIPPED | OUTPATIENT
Start: 2017-09-28 | End: 2018-01-16 | Stop reason: SDUPTHER

## 2017-09-28 NOTE — TELEPHONE ENCOUNTER
----- Message from Liborio Anderson sent at 9/28/2017 11:52 AM CDT -----  Contact: Pt  Pt is getting filling and crown at dentist and would like to know should he take any antibiotics    Pt states if he needs any antibiotics he will experience diarrhea     Contact::[phone]

## 2017-09-28 NOTE — PROGRESS NOTES
Outpatient Psychiatry Follow-Up Visit (MD/NP)    9/28/2017    Clinical Status of Patient:  Outpatient (Ambulatory)    Chief Complaint:  Ken Anderson is a 32 y.o. male who presents today for follow-up of depression, mood disorder, anxiety, attention problems and panic.  Met with patient for the first time to transition care.  Pt's last visit was with Dr. Avendano on  1/24/17.  Chart reviewed.     Interval History and Content of Current Session:  Interim Events/Subjective Report/Content of Current Session:     Current Psych Medications  · Celexa 40mg po q day  · Ativan 1mg po q hs, prn  · Vyvanse 50mg po q d    Pt reports that he took himself off Vyvanse because it was triggering him to smoke cigarettes and he was worried about it affecting his high blood pressure.  Pt currently making attempts to stop smoking.  Failed on Wellbutrin and Chantix in the past.  Reports good efficacy from Celexa and Ativan but recently ran out of refills.  Denies rebound symptoms.  Pt reports work related stress.  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  Pt uses behavior modification to manage adult ADHD.      Psychotherapy:  · Target symptoms: depression, distractability, anxiety   · Why chosen therapy is appropriate versus another modality: relevant to diagnosis  · Outcome monitoring methods: self-report  · Therapeutic intervention type: insight oriented psychotherapy  · Topics discussed/themes: symptom recognition  · The patient's response to the intervention is accepting. The patient's progress toward treatment goals is good.   · Duration of intervention: 19 minutes.    Review of Systems     GENERAL: gaining weight  SKIN: No rashes or lacerations   HEAD: No headaches   EYES: No exophthalmos, jaundice or blindness   EARS: No dizziness, tinnitus or hearing loss   NOSE: No changes in smell   MOUTH & THROAT: throat infection, currently on antibiotics.   CHEST: No shortness of breath, hyperventilation or cough   CARDIOVASCULAR: No  "tachycardia or chest pain   ABDOMEN: No nausea, vomiting, pain, constipation or diarrhea   URINARY: No frequency, dysuria or sexual dysfunction   ENDOCRINE: No polydipsia, polyuria   MUSCULOSKELETAL: No pain or stiffness of the joints   NEUROLOGIC: No weakness, sensory changes, seizures, confusion, memory loss, tremor or other abnormal movements      Psychiatric Review Of Systems:  sleep: yes  appetite changes: yes  weight changes: yes  energy/anergy: yes  interest/pleasure/anhedonia: yes  somatic symptoms: no  libido: yes  anxiety/panic: no      Past Medical, Family and Social History: The patient's past medical, family and social history have been reviewed and updated as appropriate within the electronic medical record - see encounter notes.    Compliance: yes    Side effects: None    Risk Parameters:  Patient reports no suicidal ideation  Patient reports no homicidal ideation  Patient reports no self-injurious behavior  Patient reports no violent behavior    Exam (detailed: at least 9 elements; comprehensive: all 15 elements)   Constitutional  Vitals:  Most recent vital signs, dated greater than 90 days prior to this appointment, were reviewed.   Vitals:    09/28/17 1012   BP: (!) 167/91   Pulse: 94   Weight: (!) 151.5 kg (334 lb)   Height: 6' 4" (1.93 m)        General:  unremarkable, age appropriate, well dressed, neatly groomed     Musculoskeletal  Muscle Strength/Tone:  no dyskinesia, no dystonia, no tremor, no tic   Gait & Station:  non-ataxic     Psychiatric  Speech:  no latency; no press   Mood & Affect:  euthymic  congruent and appropriate   Thought Process:  normal and logical   Associations:  intact   Thought Content:  normal, no suicidality, no homicidality, delusions, or paranoia   Insight:  has awareness of illness   Judgement: behavior is adequate to circumstances   Orientation:  grossly intact   Memory: intact for content of interview   Language: grossly intact   Attention Span & Concentration:  " able to focus   Fund of Knowledge:  intact and appropriate to age and level of education     Assessment and Diagnosis   Status/Progress: Based on the examination today, the patient's problem(s) is/are adequately but not ideally controlled.  New problems have not been presented today.   Co-morbidities are not complicating management of the primary condition.  There are no active rule-out diagnoses for this patient at this time.     General Impression:       ICD-10-CM ICD-9-CM   1. Anxiety F41.9 300.00   2. Attention deficit hyperactivity disorder (ADHD), predominantly inattentive type F90.0 314.00   3. Major depression in remission F32.5 296.25   4. Major depressive disorder with single episode, in full remission F32.5 296.26       Intervention/Counseling/Treatment Plan   · Medication Management: Continue current medications. The risks and benefits of medication were discussed with the patient.   · Restart Celexa 40mg po q day  · Continue Ativan 1mg po q day prn  · Hold Vvyanse   · Reviewed behavior modification for Adult ADHD     Okay to refill meds without an appt  Return to Clinic: 6 months

## 2017-09-29 ENCOUNTER — TELEPHONE (OUTPATIENT)
Dept: HEMATOLOGY/ONCOLOGY | Facility: CLINIC | Age: 32
End: 2017-09-29

## 2017-10-02 ENCOUNTER — LAB VISIT (OUTPATIENT)
Dept: LAB | Facility: HOSPITAL | Age: 32
End: 2017-10-02
Attending: INTERNAL MEDICINE
Payer: COMMERCIAL

## 2017-10-02 ENCOUNTER — OFFICE VISIT (OUTPATIENT)
Dept: HEMATOLOGY/ONCOLOGY | Facility: CLINIC | Age: 32
End: 2017-10-02
Payer: COMMERCIAL

## 2017-10-02 VITALS
DIASTOLIC BLOOD PRESSURE: 95 MMHG | WEIGHT: 315 LBS | HEART RATE: 87 BPM | BODY MASS INDEX: 38.36 KG/M2 | HEIGHT: 76 IN | TEMPERATURE: 99 F | SYSTOLIC BLOOD PRESSURE: 140 MMHG

## 2017-10-02 DIAGNOSIS — R89.9 ABNORMAL LABORATORY TEST: ICD-10-CM

## 2017-10-02 DIAGNOSIS — F41.1 GENERALIZED ANXIETY DISORDER: ICD-10-CM

## 2017-10-02 DIAGNOSIS — D61.9 APLASTIC ANEMIA: ICD-10-CM

## 2017-10-02 DIAGNOSIS — F90.1 ATTENTION DEFICIT HYPERACTIVITY DISORDER (ADHD), PREDOMINANTLY HYPERACTIVE TYPE: Primary | ICD-10-CM

## 2017-10-02 DIAGNOSIS — F17.200 SMOKING ADDICTION: ICD-10-CM

## 2017-10-02 LAB — PHOSPHATE SERPL-MCNC: 2.7 MG/DL

## 2017-10-02 PROCEDURE — 99999 PR PBB SHADOW E&M-EST. PATIENT-LVL III: CPT | Mod: PBBFAC,,, | Performed by: INTERNAL MEDICINE

## 2017-10-02 PROCEDURE — 84100 ASSAY OF PHOSPHORUS: CPT

## 2017-10-02 PROCEDURE — 36415 COLL VENOUS BLD VENIPUNCTURE: CPT

## 2017-10-02 PROCEDURE — 99213 OFFICE O/P EST LOW 20 MIN: CPT | Mod: S$GLB,,, | Performed by: INTERNAL MEDICINE

## 2017-10-02 NOTE — LETTER
October 2, 2017        Felix Adorno MD  [address]  [facility] [address]             [facility]  [address]  [facility] [address]  Phone: [phone]   Patient: Ken Andesron   MR Number: [id number]   YOB: 1985   Date of Visit: 10/2/2017       Dear Dr. Adorno:    Thank you for referring Ken Anderson to me for evaluation. Below are the relevant portions of my assessment and plan of care.        1. Attention deficit hyperactivity disorder (ADHD), predominantly hyperactive type    2. Generalized anxiety disorder    3. Aplastic anemia    4. BMI 38.0-38.9,adult    5. Smoking addiction          Mr. Anderson has a history of severe aplastic anemia now off CSA since 10/2016 and in partial remission with relatively stable counts despite ongoing alcohol use as of 9/28/17 WBC 3.5K (ANC 1.7K), hemoglobin 12.3 (stable) and platelet count 61K (stable). He understands that his alcohol consumption could affect his counts and is trying to cut down.  I have emphasized that he should stop that entirely.    [facility] study on 12/30/16 showed a population of PIG-A deficient cells (3.3% RBC, 11% granulocytes and monocytes) which we will need to follow over time.  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  I will recheck his PNH population at his next vist.    He will try to stay off cigarettes and is off Chantix as it made him feel weird; now on about 5 cigarettes a day.  Now on no medication for this but he understands the importance of quitting, though this continues to be a challenge given his ongoing emotional issues.  He does not want to join a group for support (for smoking or alcohol).    I again discussed with him the potential future complications of his aplastic anemia including relapsing of his aplasia and development of clonal issues including PNH (see above), MDS and  AML.  Should he relapse his aplasia he would be a candidate for other immunosuppression with agents like rabbit ATG and alemtuzumab or consideration of MUD allogeneic transplant. There would be other considerations should he develop a different clonal disorder. He will follow up every 2 months.    He has ongoing hypertension with weight gain and he will continue amlodipine 10 mg po daily and variable use of dyazide. He will follow up with Dr. Adorno for this now that he is off CSA. His renal and hepatic function were unremarkable 9/28. He is trying to exercise 3-4 days a week now and understands the importance of weight loss to his blood pressure.    All of his questions were answered in the clinic today.     [phone] (cell)     If you have questions, please do not hesitate to call me. I look forward to following Ken along with you.    Sincerely,      Kp Bellamy MD           CC  Gus King MD
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
## 2017-11-03 ENCOUNTER — LAB VISIT (OUTPATIENT)
Dept: LAB | Facility: HOSPITAL | Age: 32
End: 2017-11-03
Attending: INTERNAL MEDICINE
Payer: COMMERCIAL

## 2017-11-03 ENCOUNTER — TELEPHONE (OUTPATIENT)
Dept: HEMATOLOGY/ONCOLOGY | Facility: CLINIC | Age: 32
End: 2017-11-03

## 2017-11-03 DIAGNOSIS — D61.3 IDIOPATHIC APLASTIC ANEMIA: ICD-10-CM

## 2017-11-03 DIAGNOSIS — D61.9 ANEMIA DUE TO BONE MARROW FAILURE: ICD-10-CM

## 2017-11-03 LAB
ALBUMIN SERPL BCP-MCNC: 3.5 G/DL
ALP SERPL-CCNC: 92 U/L
ALT SERPL W/O P-5'-P-CCNC: 19 U/L
ANION GAP SERPL CALC-SCNC: 7 MMOL/L
AST SERPL-CCNC: 17 U/L
BASOPHILS # BLD AUTO: 0.02 K/UL
BASOPHILS NFR BLD: 0.4 %
BILIRUB SERPL-MCNC: 0.4 MG/DL
BUN SERPL-MCNC: 16 MG/DL
CALCIUM SERPL-MCNC: 9.3 MG/DL
CHLORIDE SERPL-SCNC: 106 MMOL/L
CO2 SERPL-SCNC: 26 MMOL/L
CREAT SERPL-MCNC: 1 MG/DL
DIFFERENTIAL METHOD: ABNORMAL
EOSINOPHIL # BLD AUTO: 0.1 K/UL
EOSINOPHIL NFR BLD: 1.3 %
ERYTHROCYTE [DISTWIDTH] IN BLOOD BY AUTOMATED COUNT: 14.9 %
EST. GFR  (AFRICAN AMERICAN): >60 ML/MIN/1.73 M^2
EST. GFR  (NON AFRICAN AMERICAN): >60 ML/MIN/1.73 M^2
GLUCOSE SERPL-MCNC: 95 MG/DL
HCT VFR BLD AUTO: 35.2 %
HGB BLD-MCNC: 12.1 G/DL
IMM GRANULOCYTES # BLD AUTO: 0.01 K/UL
IMM GRANULOCYTES NFR BLD AUTO: 0.2 %
LDH SERPL L TO P-CCNC: 450 U/L
LYMPHOCYTES # BLD AUTO: 1.8 K/UL
LYMPHOCYTES NFR BLD: 40.7 %
MCH RBC QN AUTO: 35 PG
MCHC RBC AUTO-ENTMCNC: 34.4 G/DL
MCV RBC AUTO: 102 FL
MONOCYTES # BLD AUTO: 0.7 K/UL
MONOCYTES NFR BLD: 15.3 %
NEUTROPHILS # BLD AUTO: 1.9 K/UL
NEUTROPHILS NFR BLD: 42.1 %
NRBC BLD-RTO: 0 /100 WBC
PLATELET # BLD AUTO: 59 K/UL
PMV BLD AUTO: 9.6 FL
POTASSIUM SERPL-SCNC: 3.8 MMOL/L
PROT SERPL-MCNC: 7.4 G/DL
RBC # BLD AUTO: 3.46 M/UL
SODIUM SERPL-SCNC: 139 MMOL/L
WBC # BLD AUTO: 4.5 K/UL

## 2017-11-03 PROCEDURE — 80053 COMPREHEN METABOLIC PANEL: CPT

## 2017-11-03 PROCEDURE — 85025 COMPLETE CBC W/AUTO DIFF WBC: CPT

## 2017-11-03 PROCEDURE — 36415 COLL VENOUS BLD VENIPUNCTURE: CPT

## 2017-11-03 PROCEDURE — 83615 LACTATE (LD) (LDH) ENZYME: CPT

## 2017-11-03 NOTE — TELEPHONE ENCOUNTER
Spoke with patient regarding concerns of needing lab work done today due to some worry condition. I schedule patient for today at 3pm at the [facility] on the 3rd floor. Patient verbalized understanding.    ---Yamini Nuno

## 2017-11-03 NOTE — TELEPHONE ENCOUNTER
----- Message from Liborio Anderson sent at 11/3/2017 10:53 AM CDT -----  Contact: Pt   Elijah villalta he needs to get CBC test done on today     Contact::[phone]

## 2017-11-20 DIAGNOSIS — I10 ESSENTIAL HYPERTENSION: ICD-10-CM

## 2017-11-21 RX ORDER — AMLODIPINE BESYLATE 10 MG/1
TABLET ORAL
Qty: 90 TABLET | Refills: 0 | Status: SHIPPED | OUTPATIENT
Start: 2017-11-21 | End: 2018-02-14 | Stop reason: SDUPTHER

## 2017-12-08 ENCOUNTER — OFFICE VISIT (OUTPATIENT)
Dept: DERMATOLOGY | Facility: CLINIC | Age: 32
End: 2017-12-08
Payer: COMMERCIAL

## 2017-12-08 ENCOUNTER — PATIENT MESSAGE (OUTPATIENT)
Dept: PSYCHIATRY | Facility: CLINIC | Age: 32
End: 2017-12-08

## 2017-12-08 DIAGNOSIS — F90.1 ATTENTION DEFICIT HYPERACTIVITY DISORDER (ADHD), PREDOMINANTLY HYPERACTIVE TYPE: Primary | ICD-10-CM

## 2017-12-08 DIAGNOSIS — B07.8 OTHER VIRAL WARTS: Primary | ICD-10-CM

## 2017-12-08 DIAGNOSIS — L72.0 MILIA: ICD-10-CM

## 2017-12-08 PROCEDURE — 99999 PR PBB SHADOW E&M-EST. PATIENT-LVL II: CPT | Mod: PBBFAC,,, | Performed by: DERMATOLOGY

## 2017-12-08 PROCEDURE — 17110 DESTRUCTION B9 LES UP TO 14: CPT | Mod: S$GLB,,, | Performed by: DERMATOLOGY

## 2017-12-08 PROCEDURE — 99212 OFFICE O/P EST SF 10 MIN: CPT | Mod: 25,S$GLB,, | Performed by: DERMATOLOGY

## 2017-12-08 RX ORDER — LISDEXAMFETAMINE DIMESYLATE 30 MG/1
30 CAPSULE ORAL EVERY MORNING
Qty: 30 CAPSULE | Refills: 0 | Status: SHIPPED | OUTPATIENT
Start: 2017-12-08 | End: 2018-01-10 | Stop reason: SDUPTHER

## 2017-12-08 NOTE — PROGRESS NOTES
Subjective:       Patient ID:  Ken Anderson is a 32 y.o. male who presents for   Chief Complaint   Patient presents with    Follow-up     Wart     HPI  Pt is here today for f/u warts. Most on his hands resolved but one is left. He also has some new ones on his face.    Review of Systems   Constitutional: Negative for fever, chills, weight loss, weight gain, fatigue, night sweats and malaise.   Skin: Negative for daily sunscreen use and recent sunburn.   Hematologic/Lymphatic: Does not bruise/bleed easily.        Objective:    Physical Exam   Constitutional: He appears well-developed and well-nourished. No distress.   Neurological: He is alert and oriented to person, place, and time. He is not disoriented.   Psychiatric: He has a normal mood and affect.   Skin:   Areas Examined (abnormalities noted in diagram):   Head / Face Inspection Performed  RUE Inspected  LUE Inspection Performed                  Diagram Legend     Erythematous scaling macule/papule c/w actinic keratosis       Vascular papule c/w angioma      Pigmented verrucoid papule/plaque c/w seborrheic keratosis      Yellow umbilicated papule c/w sebaceous hyperplasia      Irregularly shaped tan macule c/w lentigo     1-2 mm smooth white papules consistent with Milia      Movable subcutaneous cyst with punctum c/w epidermal inclusion cyst      Subcutaneous movable cyst c/w pilar cyst      Firm pink to brown papule c/w dermatofibroma      Pedunculated fleshy papule(s) c/w skin tag(s)      Evenly pigmented macule c/w junctional nevus     Mildly variegated pigmented, slightly irregular-bordered macule c/w mildly atypical nevus      Flesh colored to evenly pigmented papule c/w intradermal nevus       Pink pearly papule/plaque c/w basal cell carcinoma      Erythematous hyperkeratotic cursted plaque c/w SCC      Surgical scar with no sign of skin cancer recurrence      Open and closed comedones      Inflammatory papules and pustules      Verrucoid  papule consistent consistent with wart     Erythematous eczematous patches and plaques     Dystrophic onycholytic nail with subungual debris c/w onychomycosis     Umbilicated papule    Erythematous-base heme-crusted tan verrucoid plaque consistent with inflamed seborrheic keratosis     Erythematous Silvery Scaling Plaque c/w Psoriasis     See annotation      Assessment / Plan:        Other viral warts  Cryosurgery procedure note:    Verbal consent from the patient is obtained. Liquid nitrogen cryosurgery is applied to 3 lesions to produce a freeze injury. The patient is aware that blisters may form and is instructed on wound care with gentle cleansing and use of vaseline ointment to keep moist until healed. The patient is supplied a handout on cryosurgery and is instructed to call if lesions do not completely resolve.    Milia  This is a benign cyst. Reassurance provided. No treatment is necessary unless it is symptomatic. These may resolve on their own.      Return in about 4 weeks (around 1/5/2018).

## 2017-12-08 NOTE — TELEPHONE ENCOUNTER
Pt emailed complaint of ADHD symptoms causing dysfunction at work.  Sent order to restart Vyvanse 30 mg po daily.

## 2017-12-08 NOTE — PATIENT INSTRUCTIONS

## 2017-12-26 ENCOUNTER — TELEPHONE (OUTPATIENT)
Dept: HEMATOLOGY/ONCOLOGY | Facility: CLINIC | Age: 32
End: 2017-12-26

## 2017-12-26 NOTE — TELEPHONE ENCOUNTER
----- Message from Brittany Hernandez RN sent at 12/22/2017  1:27 PM CST -----  Contact: Pt      ----- Message -----  From: Briana Loera  Sent: 12/22/2017  10:07 AM  To: [facility] Staff    Pt called to speak with the nurse, pt has some questions about where Dr. Bellamy went.          Call back number: [phone]

## 2018-01-02 ENCOUNTER — LAB VISIT (OUTPATIENT)
Dept: LAB | Facility: HOSPITAL | Age: 33
End: 2018-01-02
Attending: INTERNAL MEDICINE
Payer: COMMERCIAL

## 2018-01-02 ENCOUNTER — OFFICE VISIT (OUTPATIENT)
Dept: HEMATOLOGY/ONCOLOGY | Facility: CLINIC | Age: 33
End: 2018-01-02
Payer: COMMERCIAL

## 2018-01-02 VITALS
TEMPERATURE: 98 F | RESPIRATION RATE: 20 BRPM | HEIGHT: 76 IN | HEART RATE: 76 BPM | OXYGEN SATURATION: 95 % | DIASTOLIC BLOOD PRESSURE: 77 MMHG | SYSTOLIC BLOOD PRESSURE: 137 MMHG | BODY MASS INDEX: 38.36 KG/M2 | WEIGHT: 315 LBS

## 2018-01-02 DIAGNOSIS — D61.9 APLASTIC ANEMIA: Primary | ICD-10-CM

## 2018-01-02 DIAGNOSIS — D61.9 APLASTIC ANEMIA: ICD-10-CM

## 2018-01-02 LAB
ALBUMIN SERPL BCP-MCNC: 3.6 G/DL
ALP SERPL-CCNC: 98 U/L
ALT SERPL W/O P-5'-P-CCNC: 25 U/L
ANION GAP SERPL CALC-SCNC: 7 MMOL/L
AST SERPL-CCNC: 17 U/L
BILIRUB SERPL-MCNC: 0.6 MG/DL
BUN SERPL-MCNC: 19 MG/DL
CALCIUM SERPL-MCNC: 9.1 MG/DL
CHLORIDE SERPL-SCNC: 106 MMOL/L
CO2 SERPL-SCNC: 25 MMOL/L
CREAT SERPL-MCNC: 1 MG/DL
ERYTHROCYTE [DISTWIDTH] IN BLOOD BY AUTOMATED COUNT: 15 %
EST. GFR  (AFRICAN AMERICAN): >60 ML/MIN/1.73 M^2
EST. GFR  (NON AFRICAN AMERICAN): >60 ML/MIN/1.73 M^2
GLUCOSE SERPL-MCNC: 161 MG/DL
HCT VFR BLD AUTO: 34.4 %
HGB BLD-MCNC: 12.1 G/DL
IMM GRANULOCYTES # BLD AUTO: 0.01 K/UL
MCH RBC QN AUTO: 35 PG
MCHC RBC AUTO-ENTMCNC: 35.2 G/DL
MCV RBC AUTO: 99 FL
NEUTROPHILS # BLD AUTO: 2 K/UL
PLATELET # BLD AUTO: 62 K/UL
PMV BLD AUTO: 10.8 FL
POTASSIUM SERPL-SCNC: 3.8 MMOL/L
PROT SERPL-MCNC: 7.4 G/DL
RBC # BLD AUTO: 3.46 M/UL
SODIUM SERPL-SCNC: 138 MMOL/L
WBC # BLD AUTO: 4.01 K/UL

## 2018-01-02 PROCEDURE — 85027 COMPLETE CBC AUTOMATED: CPT

## 2018-01-02 PROCEDURE — 99999 PR PBB SHADOW E&M-EST. PATIENT-LVL III: CPT | Mod: PBBFAC,,, | Performed by: INTERNAL MEDICINE

## 2018-01-02 PROCEDURE — 80053 COMPREHEN METABOLIC PANEL: CPT

## 2018-01-02 PROCEDURE — 36415 COLL VENOUS BLD VENIPUNCTURE: CPT

## 2018-01-02 PROCEDURE — 99215 OFFICE O/P EST HI 40 MIN: CPT | Mod: S$GLB,,, | Performed by: INTERNAL MEDICINE

## 2018-01-02 NOTE — PROGRESS NOTES
Hematology and Medical Oncology   Follow Up Note     01/01/2018    Primary Oncologic Diagnosis: Aplastic Anemia    History of Present Ilness:   Ken Anderson (Ken) is a pleasant 32 y.o.male who is on active surveillance for his ongoing Aplastic Anemia.     Hematology History:   --originally presented in March 2004 with an infection, in [place]. 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 Further work up in [place] in April of that year confirmed the diagnosis of severe aplastic anemia. Fanconi aplastic anemia and paroxysmal nocturnal hemoglobinuria were both excluded. Monosomy 7 was not present in marrow.  --May 2004, while still in [place], Ken received immunosuppressive therapy with rabbit ATG, cyclosporin, and G-CSF. He had a good response and became transfusion independent  --Bone Marrow examination done in June 2004 showed a cellular specimen.   --G-CSF was discontinued in November 2004.   --Beginning fall 2004 followed by Dr. Randall of the adult hematology service at [facility]. A notable problem during this time period was cyclosporin-related azotemia (specifically, elevated creatinine) which responded to increased hydration (oral and parenteral). Creatinine now runs 0.9-1.0.  --patient does not have an HLA-matched sibling. He is not interested in pursuing unrelated allogeneic marrow transplant.   --Seen at the [facility] in 2015  --off cyclosporine since 9/20/16    Interval History:   Mr. Anderson continues to have partial remission of his AA, off cyclosporine since 9/20/16.  His blood pressure has substantially improved with the help of decreased smoking and adjustment in his medication. He has siginficantly decreased the number of cigarettes down to roughly 5 per week, previously 5 per day. He has also been able to limit the alcohol to 2 cocktails which is a marked improvement from this time last year. He endorses no problems or complaints.      Past  Medical History:   Past Medical History:   Diagnosis Date    ADHD (attention deficit hyperactivity disorder)     Adjustment disorder with mixed emotional features 6/29/2012    Anxiety state, unspecified 1/7/2013    Aplastic anemia     aplastic anemia    Blood transfusion     Depression     Essential hypertension 10/12/2015    Generalized anxiety disorder 6/29/2012    GERD (gastroesophageal reflux disease)     History of eye injury 13YRS    finger stuck ?eye    Major depressive disorder, recurrent episode, mild 6/29/2012    Panic disorder without agoraphobia 6/29/2012    Tobacco abuse disorder 5/10/2017       Current Medications:   Current Outpatient Prescriptions   Medication Sig    amLODIPine (NORVASC) 10 MG tablet TAKE 1 TABLET(10 MG) BY MOUTH EVERY DAY    CALCIUM CITRATE/VITAMIN D3 (CITRACAL REGULAR ORAL) Take by mouth.    citalopram (CELEXA) 40 MG tablet Take 1 tablet (40 mg total) by mouth once daily.    ergocalciferol (VITAMIN D2) 50,000 unit Cap Take 50,000 Units by mouth every 7 days.    fexofenadine (ALLEGRA) 30 MG tablet Take 30 mg by mouth as needed.     FLUARIX QUAD [id number], PF, 60 mcg (15 mcg x 4)/0.5 mL vaccine     fluticasone (FLONASE) 50 mcg/actuation nasal spray 2 sprays by Each Nare route once daily.    lisdexamfetamine (VYVANSE) 30 MG capsule Take 1 capsule (30 mg total) by mouth every morning.    lorazepam (ATIVAN) 1 MG tablet TAKE 1 TABLET(1 MG) BY MOUTH DAILY AS NEEDED FOR ANXIETY    nystatin-triamcinolone (MYCOLOG) ointment AAA at corner of mouth BID    tretinoin (RETIN-A) 0.025 % cream Apply small amount to entire face qhs. Wash off qam and apply sunscreen.    triamterene-hydrochlorothiazide 37.5-25 mg (DYAZIDE) 37.5-25 mg per capsule Take 1 capsule by mouth once daily.    valacyclovir (VALTREX) 1000 MG tablet Take 1 tablet (1,000 mg total) by mouth every 12 (twelve) hours. Take 2 tabs po BID x 1 day. Begin at first sign of outbreak    varenicline (CHANTIX) 1 mg  Tab Take 1 tablet (1 mg total) by mouth 2 (two) times daily.    VITAMIN B COMPLEX ORAL Take by mouth.     No current facility-administered medications for this visit.      ALLERGIES:   Review of patient's allergies indicates:   Allergen Reactions    No known drug allergies        Review of Systems:     Review of Systems   Constitutional: Negative for appetite change, chills, diaphoresis, fatigue, fever and unexpected weight change.   HENT:   Negative for hearing loss, mouth sores, nosebleeds, sore throat, trouble swallowing and voice change.    Eyes: Negative for eye problems and icterus.   Respiratory: Negative for chest tightness, cough, hemoptysis, shortness of breath and wheezing.    Cardiovascular: Negative for chest pain, leg swelling and palpitations.   Gastrointestinal: Negative for abdominal distention, abdominal pain, blood in stool, diarrhea, nausea and vomiting.   Endocrine: Negative for hot flashes.   Genitourinary: Negative for bladder incontinence, difficulty urinating, dysuria and hematuria.    Musculoskeletal: Negative for arthralgias, back pain, flank pain, gait problem, myalgias, neck pain and neck stiffness.   Skin: Negative for itching, rash and wound.   Neurological: Negative for dizziness, extremity weakness, gait problem, headaches, numbness, seizures and speech difficulty.   Hematological: Negative for adenopathy. Does not bruise/bleed easily.   Psychiatric/Behavioral: Negative for confusion, depression and sleep disturbance. The patient is not nervous/anxious.           Physical Exam:     There were no vitals filed for this visit.  Physical Exam   Constitutional: He is oriented to person, place, and time. He appears well-developed and well-nourished. No distress.   HENT:   Head: Normocephalic and atraumatic.   Mouth/Throat: Oropharynx is clear and moist. No oropharyngeal exudate.   Eyes: Conjunctivae and EOM are normal. Pupils are equal, round, and reactive to light.   Neck: Normal range  of motion. Neck supple. No JVD present. No tracheal deviation present. No thyromegaly present.   Cardiovascular: Normal rate, regular rhythm and normal heart sounds.  Exam reveals no friction rub.    No murmur heard.  Pulmonary/Chest: Effort normal and breath sounds normal. No stridor. No respiratory distress. He has no wheezes. He has no rales. He exhibits no tenderness.   Abdominal: Soft. Bowel sounds are normal. He exhibits no distension. There is no tenderness. There is no rebound and no guarding.   Musculoskeletal: Normal range of motion. He exhibits no edema, tenderness or deformity.   Lymphadenopathy:     He has no axillary adenopathy.   Neurological: He is alert and oriented to person, place, and time. He displays normal reflexes. No cranial nerve deficit or sensory deficit. He exhibits normal muscle tone. Coordination normal.   Skin: Skin is warm and dry. Capillary refill takes less than 2 seconds. No rash noted. He is not diaphoretic. No erythema. No pallor.   Psychiatric: He has a normal mood and affect. His behavior is normal. Judgment and thought content normal.       ECOG Performance Status: (foot note - ECOG PS provided by Eastern Cooperative Oncology Group) 0 - Asymptomatic    Karnofsky Performance Score:  100%- Normal, No Complaints, No Evidence of Disease    Labs:   Lab Results   Component Value Date    WBC 4.01 01/02/2018    HGB 12.1 (L) 01/02/2018    HCT 34.4 (L) 01/02/2018    PLT 62 (L) 01/02/2018    CHOL 185 02/03/2014    TRIG 122 02/03/2014    HDL 51 02/03/2014    ALT 25 01/02/2018    AST 17 01/02/2018     01/02/2018    K 3.8 01/02/2018     01/02/2018    CREATININE 1.0 01/02/2018    BUN 19 01/02/2018    CO2 25 01/02/2018          Assessment and Plan:     Mr. Anderson is a 32 year old male with aplastic anemia in partial remission    Aplastic Anemia  --severe aplastic anemia treated in 2004  --now off CSA since 10/2016 and in partial remission with relatively stable counts   --He  continues to work on alcohol cessation  --PNH study on 12/30/16 showed a population of PIG-A deficient cells (3.3% RBC, 11% granulocytes and monocytes) which we will need to follow over time.  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    --Plan to recheck PNH population at his next vist    We discussed the potential future complications of his aplastic anemia including relapsing of his aplasia and development of clonal issues including PNH (see above), MDS and AML. Should he relapse his aplasia he would be a candidate for other immunosuppression with agents like rabbit ATG and alemtuzumab or consideration of MUD allogeneic transplant. There would be other considerations should he develop a different clonal disorder.      Hypertension  --Improved on medication and with healthier lifestyle habits  --He plans to add a regular exercise routine back into his lifestyle      30 minutes were spent face to face with the patient to discuss the disease, natural history, treatment options and survival statistics. I have provided the patient with an opportunity to ask questions and have all questions answered to his satisfaction.       he will return to clinic in 6 months, but knows to call in the interim if symptoms change or should a problem arise.        Ave Chowdhury MD  Hematology and Medical Oncology  Bone Marrow Transplant  [facility]

## 2018-01-08 DIAGNOSIS — F32.5 MAJOR DEPRESSIVE DISORDER WITH SINGLE EPISODE, IN FULL REMISSION: ICD-10-CM

## 2018-01-09 RX ORDER — CITALOPRAM 40 MG/1
TABLET, FILM COATED ORAL
Qty: 90 TABLET | Refills: 0 | Status: SHIPPED | OUTPATIENT
Start: 2018-01-09 | End: 2018-03-09 | Stop reason: SDUPTHER

## 2018-01-10 ENCOUNTER — PATIENT MESSAGE (OUTPATIENT)
Dept: PSYCHIATRY | Facility: CLINIC | Age: 33
End: 2018-01-10

## 2018-01-10 DIAGNOSIS — F90.1 ATTENTION DEFICIT HYPERACTIVITY DISORDER (ADHD), PREDOMINANTLY HYPERACTIVE TYPE: ICD-10-CM

## 2018-01-10 RX ORDER — LISDEXAMFETAMINE DIMESYLATE 50 MG/1
50 CAPSULE ORAL EVERY MORNING
Qty: 30 CAPSULE | Refills: 0 | Status: SHIPPED | OUTPATIENT
Start: 2018-01-10 | End: 2018-01-16 | Stop reason: SDUPTHER

## 2018-01-16 ENCOUNTER — OFFICE VISIT (OUTPATIENT)
Dept: PSYCHIATRY | Facility: CLINIC | Age: 33
End: 2018-01-16
Payer: COMMERCIAL

## 2018-01-16 VITALS
DIASTOLIC BLOOD PRESSURE: 91 MMHG | HEART RATE: 95 BPM | SYSTOLIC BLOOD PRESSURE: 142 MMHG | WEIGHT: 315 LBS | BODY MASS INDEX: 40.73 KG/M2

## 2018-01-16 DIAGNOSIS — F41.1 GENERALIZED ANXIETY DISORDER: ICD-10-CM

## 2018-01-16 DIAGNOSIS — F41.0 PANIC DISORDER WITHOUT AGORAPHOBIA: ICD-10-CM

## 2018-01-16 DIAGNOSIS — F90.1 ATTENTION DEFICIT HYPERACTIVITY DISORDER (ADHD), PREDOMINANTLY HYPERACTIVE TYPE: ICD-10-CM

## 2018-01-16 DIAGNOSIS — F33.2 SEVERE EPISODE OF RECURRENT MAJOR DEPRESSIVE DISORDER, WITHOUT PSYCHOTIC FEATURES: Primary | ICD-10-CM

## 2018-01-16 PROCEDURE — 99999 PR PBB SHADOW E&M-EST. PATIENT-LVL II: CPT | Mod: PBBFAC,,, | Performed by: NURSE PRACTITIONER

## 2018-01-16 PROCEDURE — 90833 PSYTX W PT W E/M 30 MIN: CPT | Mod: S$GLB,,, | Performed by: NURSE PRACTITIONER

## 2018-01-16 PROCEDURE — 99214 OFFICE O/P EST MOD 30 MIN: CPT | Mod: S$GLB,,, | Performed by: NURSE PRACTITIONER

## 2018-01-16 RX ORDER — LAMOTRIGINE 25 MG/1
TABLET ORAL
Qty: 60 TABLET | Refills: 2 | Status: SHIPPED | OUTPATIENT
Start: 2018-01-16 | End: 2018-03-09

## 2018-01-16 RX ORDER — LISDEXAMFETAMINE DIMESYLATE 50 MG/1
50 CAPSULE ORAL EVERY MORNING
Qty: 30 CAPSULE | Refills: 0 | Status: SHIPPED | OUTPATIENT
Start: 2018-03-10 | End: 2018-01-16 | Stop reason: SDUPTHER

## 2018-01-16 RX ORDER — LISDEXAMFETAMINE DIMESYLATE 50 MG/1
50 CAPSULE ORAL EVERY MORNING
Qty: 30 CAPSULE | Refills: 0 | Status: SHIPPED | OUTPATIENT
Start: 2018-04-10 | End: 2018-02-27 | Stop reason: SDUPTHER

## 2018-01-16 RX ORDER — LISDEXAMFETAMINE DIMESYLATE 50 MG/1
50 CAPSULE ORAL EVERY MORNING
Qty: 30 CAPSULE | Refills: 0 | Status: SHIPPED | OUTPATIENT
Start: 2018-02-10 | End: 2018-01-16 | Stop reason: SDUPTHER

## 2018-01-16 RX ORDER — LORAZEPAM 1 MG/1
TABLET ORAL
Qty: 30 TABLET | Refills: 5 | Status: SHIPPED | OUTPATIENT
Start: 2018-01-16 | End: 2018-03-09 | Stop reason: SDUPTHER

## 2018-01-16 NOTE — PROGRESS NOTES
Outpatient Psychiatry Follow-Up Visit (MD/NP)    1/16/2018    Clinical Status of Patient:  Outpatient (Ambulatory)    Chief Complaint:  Ken Anderson is a 32 y.o. male who presents today for follow-up of depression, mood disorder, anxiety, attention problems and panic.  Met with patient  Chartand  reviewed  .     Interval History and Content of Current Session:  Current Psych Medications  · Celexa 40mg po q day  · Ativan 1mg po q hs, prn  · Vyvanse 50mg po q d    Pt restarted Vyvanse since last visit due to return of ADHD symptoms of inattention and struggle with focus.  Pt presented blunted affect today and dysthymic mood.  Pt reports that his significant other observed lack of affect.  Pt endorses feelings of guilt, irrational negative thought patterns, anhedonia, anxiety, and passive suicidal thoughts.  Pt verbally contracted for safety and verbalized safety plan (go to parents house) if SI were to become active. Pt has therapist, Lourdes Zamora LPC.  Unclear if they have been working on CBT modalities.      Pt unable to identify any situational life changes and triggering event for mood changes.  Pt reports history of recurrent depression with similar features.  Pt reluctant to make major changes to medication.  Pt agreed to recommendation on augmenting with Lamictal.  Informed pt of signs/symptoms of potential adverse reaction including SJS.       Psychotherapy:  · Target symptoms: depression, distractability, anxiety   · Why chosen therapy is appropriate versus another modality: relevant to diagnosis  · Outcome monitoring methods: self-report  · Therapeutic intervention type: insight oriented psychotherapy  · Topics discussed/themes: symptom recognition  · The patient's response to the intervention is accepting. The patient's progress toward treatment goals is good.   · Duration of intervention: 22 minutes.    Review of Systems     GENERAL: gaining weight  SKIN: No rashes or lacerations   HEAD: No  headaches   EYES: No exophthalmos, jaundice or blindness   EARS: No dizziness, tinnitus or hearing loss   NOSE: No changes in smell   MOUTH & THROAT: throat infection, currently on antibiotics.   CHEST: No shortness of breath, hyperventilation or cough   CARDIOVASCULAR: No tachycardia or chest pain   ABDOMEN: No nausea, vomiting, pain, constipation or diarrhea   URINARY: No frequency, dysuria or sexual dysfunction   ENDOCRINE: No polydipsia, polyuria   MUSCULOSKELETAL: No pain or stiffness of the joints   NEUROLOGIC: No weakness, sensory changes, seizures, confusion, memory loss, tremor or other abnormal movements      Psychiatric Review Of Systems:  sleep: yes  appetite changes: yes  weight changes: yes  energy/anergy: yes  interest/pleasure/anhedonia: yes  somatic symptoms: no  libido: yes  anxiety/panic: no      Past Medical, Family and Social History: The patient's past medical, family and social history have been reviewed and updated as appropriate within the electronic medical record - see encounter notes.    Compliance: yes    Side effects: None    Risk Parameters:  Patient reports no suicidal ideation  Patient reports no homicidal ideation  Patient reports no self-injurious behavior  Patient reports no violent behavior    Exam (detailed: at least 9 elements; comprehensive: all 15 elements)   Constitutional  Vitals:  Most recent vital signs, dated greater than 90 days prior to this appointment, were reviewed.   Vitals:    01/16/18 0811   BP: (!) 142/91   Pulse: 95   Weight: (!) 151.8 kg (334 lb 9.6 oz)        General:  unremarkable, age appropriate, well dressed, neatly groomed     Musculoskeletal  Muscle Strength/Tone:  no dyskinesia, no dystonia, no tremor, no tic   Gait & Station:  non-ataxic     Psychiatric  Speech:  no latency; no press   Mood & Affect:  euthymic  congruent and appropriate   Thought Process:  normal and logical   Associations:  intact   Thought Content:  delusions: no, hallucinations:  (auditory: no), suicidal thoughts: (active-no, passive-yes)   Insight:  has awareness of illness   Judgement: behavior is adequate to circumstances   Orientation:  grossly intact   Memory: intact for content of interview   Language: grossly intact   Attention Span & Concentration:  able to focus   Fund of Knowledge:  intact and appropriate to age and level of education     Assessment and Diagnosis   Status/Progress: Based on the examination today, the patient's problem(s) is/are worsening.  New problems have been presented today (active recurrence of depressive episode).   Co-morbidities are not complicating management of the primary condition.  There are no active rule-out diagnoses for this patient at this time.     General Impression:       ICD-10-CM ICD-9-CM   1. Severe episode of recurrent major depressive disorder, without psychotic features F33.2 296.33   2. Attention deficit hyperactivity disorder (ADHD), predominantly hyperactive type F90.1 314.01   3. Generalized anxiety disorder F41.1 300.02   4. Panic disorder without agoraphobia F41.0 300.01       Intervention/Counseling/Treatment Plan   · Medication Management: Continue current medications. The risks and benefits of medication were discussed with the patient.   · Continue Celexa 40mg po q day  · Continue Ativan 1mg po q day prn  · Continue Vyvanse 50 mg po daily ( 3 months Rx printed)  · Start Lamictal 25 mg po daily x 14 days then increase to 50 mg po daily.  Educated pt on potential S/E including SJS  · Continue individual psychotherapy with Debora Zamora LPC  · Offered and reviewed CBT educational handouts  · Reviewed Safety Plan and emergency contacts.       Return to Clinic: 1 month

## 2018-02-05 ENCOUNTER — PATIENT MESSAGE (OUTPATIENT)
Dept: PSYCHIATRY | Facility: CLINIC | Age: 33
End: 2018-02-05

## 2018-02-14 DIAGNOSIS — I10 ESSENTIAL HYPERTENSION: ICD-10-CM

## 2018-02-14 RX ORDER — AMLODIPINE BESYLATE 10 MG/1
TABLET ORAL
Qty: 90 TABLET | Refills: 0 | Status: SHIPPED | OUTPATIENT
Start: 2018-02-14 | End: 2018-05-25 | Stop reason: SDUPTHER

## 2018-02-15 ENCOUNTER — PATIENT MESSAGE (OUTPATIENT)
Dept: HEMATOLOGY/ONCOLOGY | Facility: CLINIC | Age: 33
End: 2018-02-15

## 2018-02-27 ENCOUNTER — PATIENT MESSAGE (OUTPATIENT)
Dept: PSYCHIATRY | Facility: CLINIC | Age: 33
End: 2018-02-27

## 2018-02-27 DIAGNOSIS — F90.1 ATTENTION DEFICIT HYPERACTIVITY DISORDER (ADHD), PREDOMINANTLY HYPERACTIVE TYPE: ICD-10-CM

## 2018-02-27 RX ORDER — LISDEXAMFETAMINE DIMESYLATE 30 MG/1
30 CAPSULE ORAL EVERY MORNING
Qty: 30 CAPSULE | Refills: 0 | Status: SHIPPED | OUTPATIENT
Start: 2018-04-10 | End: 2018-03-09 | Stop reason: SDUPTHER

## 2018-03-09 ENCOUNTER — OFFICE VISIT (OUTPATIENT)
Dept: PSYCHIATRY | Facility: CLINIC | Age: 33
End: 2018-03-09
Payer: COMMERCIAL

## 2018-03-09 VITALS
WEIGHT: 315 LBS | SYSTOLIC BLOOD PRESSURE: 157 MMHG | HEART RATE: 107 BPM | DIASTOLIC BLOOD PRESSURE: 97 MMHG | BODY MASS INDEX: 40.95 KG/M2

## 2018-03-09 DIAGNOSIS — F33.1 MODERATE EPISODE OF RECURRENT MAJOR DEPRESSIVE DISORDER: Primary | ICD-10-CM

## 2018-03-09 DIAGNOSIS — F41.0 PANIC DISORDER WITHOUT AGORAPHOBIA: ICD-10-CM

## 2018-03-09 DIAGNOSIS — F41.1 GENERALIZED ANXIETY DISORDER: ICD-10-CM

## 2018-03-09 DIAGNOSIS — F90.1 ATTENTION DEFICIT HYPERACTIVITY DISORDER (ADHD), PREDOMINANTLY HYPERACTIVE TYPE: ICD-10-CM

## 2018-03-09 PROBLEM — F32.5 MAJOR DEPRESSIVE DISORDER WITH SINGLE EPISODE, IN FULL REMISSION: Status: ACTIVE | Noted: 2018-03-09

## 2018-03-09 PROCEDURE — 90833 PSYTX W PT W E/M 30 MIN: CPT | Mod: S$GLB,,, | Performed by: NURSE PRACTITIONER

## 2018-03-09 PROCEDURE — 3080F DIAST BP >= 90 MM HG: CPT | Mod: S$GLB,,, | Performed by: NURSE PRACTITIONER

## 2018-03-09 PROCEDURE — 3077F SYST BP >= 140 MM HG: CPT | Mod: S$GLB,,, | Performed by: NURSE PRACTITIONER

## 2018-03-09 PROCEDURE — 99999 PR PBB SHADOW E&M-EST. PATIENT-LVL II: CPT | Mod: PBBFAC,,, | Performed by: NURSE PRACTITIONER

## 2018-03-09 PROCEDURE — 99214 OFFICE O/P EST MOD 30 MIN: CPT | Mod: S$GLB,,, | Performed by: NURSE PRACTITIONER

## 2018-03-09 RX ORDER — CITALOPRAM 40 MG/1
TABLET, FILM COATED ORAL
Qty: 90 TABLET | Refills: 1 | Status: SHIPPED | OUTPATIENT
Start: 2018-03-09 | End: 2018-05-10 | Stop reason: SDUPTHER

## 2018-03-09 RX ORDER — LISDEXAMFETAMINE DIMESYLATE 30 MG/1
30 CAPSULE ORAL EVERY MORNING
Qty: 30 CAPSULE | Refills: 0 | Status: SHIPPED | OUTPATIENT
Start: 2018-04-27 | End: 2018-03-09 | Stop reason: SDUPTHER

## 2018-03-09 RX ORDER — LORAZEPAM 1 MG/1
TABLET ORAL
Qty: 30 TABLET | Refills: 5 | Status: SHIPPED | OUTPATIENT
Start: 2018-03-09 | End: 2018-05-10 | Stop reason: SDUPTHER

## 2018-03-09 RX ORDER — LISDEXAMFETAMINE DIMESYLATE 30 MG/1
30 CAPSULE ORAL EVERY MORNING
Qty: 30 CAPSULE | Refills: 0 | Status: SHIPPED | OUTPATIENT
Start: 2018-05-27 | End: 2018-03-20 | Stop reason: SDUPTHER

## 2018-03-09 RX ORDER — ARIPIPRAZOLE 2 MG/1
2 TABLET ORAL DAILY
Qty: 30 TABLET | Refills: 2 | Status: SHIPPED | OUTPATIENT
Start: 2018-03-09 | End: 2018-05-10 | Stop reason: SDUPTHER

## 2018-03-09 NOTE — PROGRESS NOTES
Outpatient Psychiatry Follow-Up Visit (MD/NP)    3/9/2018    Clinical Status of Patient:  Outpatient (Ambulatory)    Chief Complaint:  Ken Anderson is a 32 y.o. male who presents today for follow-up of depression, mood disorder, anxiety, attention problems and panic.  Met with patient    Last visit was on 1/16/18. Chart and  reviewed  .     Interval History and Content of Current Session:  Current Psych Medications  · Continue Celexa 40mg po q day  · Continue Ativan 1mg po q day prn  · Continue Vyvanse 30 mg po daily (decreased from 50 mg )  · Start Lamictal 25 mg po daily x 14 days then increase to 50 mg po daily. (disocontinue)       Pt states that Lamicatal caused side-effects of insomnia.  He reports feeling better with Vyvanse 30 mg which was decreased from previous 50 mg Rx.  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  Stated he is in the process of getting new job, new apartment, and thingking about getting out of his relationhsip. Continues to endorse symptoms of sadness, dysphoria, and anhedonia.  Affects appears blunted. Pt is receptive to recommendation of trial of Abilifty. Educated pt on side-effect profile. Will consider changing SSRI in the future but pt wants to wait until he has dealt with situational transitions. Denies SI/HI/AVH.        Psychotherapy:  · Target symptoms: depression, distractability, anxiety   · Why chosen therapy is appropriate versus another modality: relevant to diagnosis  · Outcome monitoring methods: self-report  · Therapeutic intervention type: insight oriented psychotherapy  · Topics discussed/themes: symptom recognition  · The patient's response to the intervention is accepting. The patient's progress toward treatment goals is fair.   · Duration of intervention: 20 minutes.    Review of Systems     GENERAL: no weight change  SKIN: No rashes or lacerations   HEAD: No headaches   EYES: No exophthalmos, jaundice or  blindness   EARS: No dizziness, tinnitus or hearing loss   NOSE: No changes in smell   MOUTH & THROAT: throat infection, currently on antibiotics.   CHEST: No shortness of breath, hyperventilation or cough   CARDIOVASCULAR: No tachycardia or chest pain   ABDOMEN: No nausea, vomiting, pain, constipation or diarrhea   URINARY: No frequency, dysuria or sexual dysfunction   ENDOCRINE: No polydipsia, polyuria   MUSCULOSKELETAL: No pain or stiffness of the joints   NEUROLOGIC: No weakness, sensory changes, seizures, confusion, memory loss, tremor or other abnormal movements      Psychiatric Review Of Systems:  sleep: yes  appetite changes: yes  weight changes: yes  energy/anergy: yes  interest/pleasure/anhedonia: yes  somatic symptoms: no  libido: yes  anxiety/panic: no      Past Medical, Family and Social History: The patient's past medical, family and social history have been reviewed and updated as appropriate within the electronic medical record - see encounter notes.    Compliance: yes    Side effects: see above    Risk Parameters:  Patient reports no suicidal ideation  Patient reports no homicidal ideation  Patient reports no self-injurious behavior  Patient reports no violent behavior    Exam (detailed: at least 9 elements; comprehensive: all 15 elements)   Constitutional  Vitals:  Most recent vital signs, dated greater than 90 days prior to this appointment, were reviewed.   Vitals:    03/09/18 1418   BP: (!) 157/97   Pulse: 107   Weight: (!) 152.6 kg (336 lb 6.8 oz)        General:  unremarkable, age appropriate, well dressed, neatly groomed     Musculoskeletal  Muscle Strength/Tone:  no dyskinesia, no dystonia, no tremor, no tic   Gait & Station:  non-ataxic     Psychiatric  Speech:  no latency; no press   Mood & Affect:  dysthymic  blunted   Thought Process:  normal and logical   Associations:  intact   Thought Content:  normal, no suicidality, no homicidality, delusions, or paranoia   Insight:  has awareness  of illness   Judgement: behavior is adequate to circumstances   Orientation:  grossly intact   Memory: intact for content of interview   Language: grossly intact   Attention Span & Concentration:  able to focus   Fund of Knowledge:  intact and appropriate to age and level of education     Assessment and Diagnosis   Status/Progress: Based on the examination today, the patient's problem(s) is/are failing to respond as expected to treatment.  New problems have not been presented today.   Co-morbidities are not complicating management of the primary condition.  There are no active rule-out diagnoses for this patient at this time.     General Impression:       ICD-10-CM ICD-9-CM   1. Moderate episode of recurrent major depressive disorder F33.1 296.32   2. Attention deficit hyperactivity disorder (ADHD), predominantly hyperactive type F90.1 314.01   3. Generalized anxiety disorder F41.1 300.02   4. Panic disorder without agoraphobia F41.0 300.01       Intervention/Counseling/Treatment Plan   · Medication Management: Continue current medications. The risks and benefits of medication were discussed with the patient.   · Start Abilify 2 mg po daily.  Will increase to 5 mg next encounter if tolerated. Educated pt on potential side effects.   · Continue Celexa 40mg po q day  · Continue Ativan 1mg po q day prn  · Continue Vyvanse 30 mg po daily ( 2 months Rx printed)  · DC Lamictal  · Continue individual psychotherapy with Debora Zamora LPC  · Reviewed Safety Plan and emergency contacts.     Return to Clinic: 3 months

## 2018-03-16 ENCOUNTER — PATIENT MESSAGE (OUTPATIENT)
Dept: PSYCHIATRY | Facility: CLINIC | Age: 33
End: 2018-03-16

## 2018-03-20 DIAGNOSIS — G47.00 INSOMNIA DISORDER WITH NON-SLEEP DISORDER MENTAL COMORBIDITY: Primary | ICD-10-CM

## 2018-03-20 DIAGNOSIS — F90.1 ATTENTION DEFICIT HYPERACTIVITY DISORDER (ADHD), PREDOMINANTLY HYPERACTIVE TYPE: ICD-10-CM

## 2018-03-20 RX ORDER — LISDEXAMFETAMINE DIMESYLATE 30 MG/1
30 CAPSULE ORAL EVERY MORNING
Qty: 30 CAPSULE | Refills: 0 | Status: SHIPPED | OUTPATIENT
Start: 2018-03-26 | End: 2018-04-27 | Stop reason: SDUPTHER

## 2018-03-20 RX ORDER — TRAZODONE HYDROCHLORIDE 100 MG/1
TABLET ORAL
Qty: 30 TABLET | Refills: 3 | Status: SHIPPED | OUTPATIENT
Start: 2018-03-20 | End: 2018-04-24

## 2018-03-20 NOTE — TELEPHONE ENCOUNTER
Pt reports insomnia and accidentally washed printed Vyvanse Rx.  Sent order for Trazodone and Vyvanse refill.

## 2018-04-24 ENCOUNTER — PATIENT MESSAGE (OUTPATIENT)
Dept: PSYCHIATRY | Facility: CLINIC | Age: 33
End: 2018-04-24

## 2018-04-24 DIAGNOSIS — G47.00 INSOMNIA DISORDER WITH NON-SLEEP DISORDER MENTAL COMORBIDITY: Primary | ICD-10-CM

## 2018-04-24 RX ORDER — MIRTAZAPINE 15 MG/1
15 TABLET, FILM COATED ORAL NIGHTLY
Qty: 30 TABLET | Refills: 3 | Status: SHIPPED | OUTPATIENT
Start: 2018-04-24 | End: 2018-05-10 | Stop reason: SDUPTHER

## 2018-04-27 ENCOUNTER — PATIENT MESSAGE (OUTPATIENT)
Dept: PSYCHIATRY | Facility: CLINIC | Age: 33
End: 2018-04-27

## 2018-04-27 ENCOUNTER — OFFICE VISIT (OUTPATIENT)
Dept: SLEEP MEDICINE | Facility: CLINIC | Age: 33
End: 2018-04-27
Payer: COMMERCIAL

## 2018-04-27 VITALS
DIASTOLIC BLOOD PRESSURE: 108 MMHG | WEIGHT: 315 LBS | SYSTOLIC BLOOD PRESSURE: 150 MMHG | HEART RATE: 102 BPM | OXYGEN SATURATION: 97 % | BODY MASS INDEX: 38.36 KG/M2 | HEIGHT: 76 IN

## 2018-04-27 DIAGNOSIS — G47.33 OSA (OBSTRUCTIVE SLEEP APNEA): Primary | ICD-10-CM

## 2018-04-27 DIAGNOSIS — F90.1 ATTENTION DEFICIT HYPERACTIVITY DISORDER (ADHD), PREDOMINANTLY HYPERACTIVE TYPE: ICD-10-CM

## 2018-04-27 PROCEDURE — 99999 PR PBB SHADOW E&M-EST. PATIENT-LVL III: CPT | Mod: PBBFAC,,, | Performed by: INTERNAL MEDICINE

## 2018-04-27 PROCEDURE — 99214 OFFICE O/P EST MOD 30 MIN: CPT | Mod: S$GLB,,, | Performed by: INTERNAL MEDICINE

## 2018-04-27 RX ORDER — LISDEXAMFETAMINE DIMESYLATE 50 MG/1
CAPSULE ORAL
COMMUNITY
Start: 2018-02-12 | End: 2018-05-10 | Stop reason: DRUGHIGH

## 2018-04-27 RX ORDER — LISDEXAMFETAMINE DIMESYLATE 30 MG/1
30 CAPSULE ORAL EVERY MORNING
Qty: 30 CAPSULE | Refills: 0 | Status: SHIPPED | OUTPATIENT
Start: 2018-04-27 | End: 2018-05-10 | Stop reason: DRUGHIGH

## 2018-04-27 NOTE — PROGRESS NOTES
Ken Anderson  was seen as a follow up.    CHIEF COMPLAINT:    Chief Complaint   Patient presents with    Sleep Apnea    Follow-up       HISTORY OF PRESENT ILLNESS: Ken Anderson is a 32 y.o. male is here for sleep evaluation.   Our first enconter was 9/23/14. Patient was told by father and sister (both are sleep boarded) to have sleep apnea.  Patient with loud snoring.  +fatigue upon awakening.  +weight gain 60 # since 2013.  +sleepiness a/w driving on rare occasion.  No parasomnia.  No cataplexy.  No rls symptoms.      [place] Sleepiness Scale score during initial sleep evaluation was 7.  Patient was set up for apap 11/14.   +using apap most night.  Sleep better with apap.  Rested upon awake.  No snoring with apap.  No difficulty with sleep initiation.  No dry mouth.  Patient admits to lots of stress with new position  of [employer] in [place].  +stress eating.      SLEEP ROUTINE:  Activity the hour prior to sleep: read     Bed partner:  alone  Time to bed:  9:45 pm   Lights off:  yes  Sleep onset latency:  10 minutes        Disruptions or awakenings:    none    Wakeup time:      5:30 am  Perceived sleep quality:  Rested      Daytime naps:      none  Weekend sleep routine:      12 am till 8 am   Caffeine use: 2 cups of coffee in am and 1 diet coke throughout the day; last coke around 3 pm  exercise habit:   [employer] 6 am daily    PAST MEDICAL HISTORY:    Active Ambulatory Problems     Diagnosis Date Noted    ADHD (attention deficit hyperactivity disorder) 06/29/2012    Severe episode of recurrent major depressive disorder, without psychotic features 06/29/2012    Generalized anxiety disorder 06/29/2012    Panic disorder without agoraphobia 06/29/2012    Adjustment disorder with mixed emotional features 06/29/2012    Aplastic anemia 07/23/2012    Smoking addiction 08/21/2012    Essential hypertension 10/12/2015    Knee pain, bilateral 02/22/2016    Hypertension not at goal 05/10/2017     BMI 38.0-38.9,adult 05/10/2017    Tobacco abuse disorder 05/10/2017    Major depressive disorder with single episode, in full remission 03/09/2018     Resolved Ambulatory Problems     Diagnosis Date Noted    Depressive disorder, not elsewhere classified 07/30/2012    Adjustment disorder with mixed anxiety and depressed mood 07/30/2012    ADD (attention deficit disorder) 10/01/2012    Major depressive disorder, recurrent episode, moderate 11/12/2012    Anxiety state, unspecified 01/07/2013    Infiltrate of cornea - Right Eye 03/01/2013    Cornea ulcer - Right Eye 03/04/2013     Past Medical History:   Diagnosis Date    ADHD (attention deficit hyperactivity disorder)     Adjustment disorder with mixed emotional features 6/29/2012    Anxiety state, unspecified 1/7/2013    Aplastic anemia     Blood transfusion     Depression     Essential hypertension 10/12/2015    Generalized anxiety disorder 6/29/2012    GERD (gastroesophageal reflux disease)     History of eye injury 13YRS    Major depressive disorder, recurrent episode, mild 6/29/2012    Panic disorder without agoraphobia 6/29/2012    Tobacco abuse disorder 5/10/2017                PAST SURGICAL HISTORY:    Past Surgical History:   Procedure Laterality Date    BONE MARROW BIOPSY      COLONOSCOPY      ESOPHAGOGASTRODUODENOSCOPY           FAMILY HISTORY:                Family History   Problem Relation Age of Onset    Cancer Father      skin    Skin cancer Father     Emphysema Maternal Grandmother     Prostate cancer Maternal Grandfather     Dementia Paternal Grandmother     Cancer Paternal Grandfather      liver    Melanoma Neg Hx     Psoriasis Neg Hx     Lupus Neg Hx     Eczema Neg Hx     Amblyopia Neg Hx     Blindness Neg Hx     Cataracts Neg Hx     Diabetes Neg Hx     Glaucoma Neg Hx     Hypertension Neg Hx     Macular degeneration Neg Hx     Retinal detachment Neg Hx     Strabismus Neg Hx     Stroke Neg Hx      Thyroid disease Neg Hx        SOCIAL HISTORY:          Tobacco:   History   Smoking Status    Current Every Day Smoker    Packs/day: 0.30    Years: 7.00    Types: Cigarettes   Smokeless Tobacco    Current User       alcohol use:    History   Alcohol Use    2.4 oz/week    4 Standard drinks or equivalent per week     Comment: 4 week                 Occupation:   for [employer]    ALLERGIES:    Review of patient's allergies indicates:   Allergen Reactions    No known drug allergies        CURRENT MEDICATIONS:    Current Outpatient Prescriptions   Medication Sig Dispense Refill    amLODIPine (NORVASC) 10 MG tablet TAKE 1 TABLET(10 MG) BY MOUTH EVERY DAY 90 tablet 0    ARIPiprazole (ABILIFY) 2 MG Tab Take 1 tablet (2 mg total) by mouth once daily. 30 tablet 2    CALCIUM CITRATE/VITAMIN D3 (CITRACAL REGULAR ORAL) Take by mouth.      citalopram (CELEXA) 40 MG tablet TAKE 1 TABLET(40 MG) BY MOUTH EVERY DAY 90 tablet 1    fexofenadine (ALLEGRA) 30 MG tablet Take 30 mg by mouth as needed.       FLUARIX QUAD [id number], PF, 60 mcg (15 mcg x 4)/0.5 mL vaccine       fluticasone (FLONASE) 50 mcg/actuation nasal spray 2 sprays by Each Nare route once daily. 16 g 3    lisdexamfetamine (VYVANSE) 30 MG capsule Take 1 capsule (30 mg total) by mouth every morning. 30 capsule 0    LORazepam (ATIVAN) 1 MG tablet TAKE 1 TABLET(1 MG) BY MOUTH DAILY AS NEEDED FOR ANXIETY 30 tablet 5    mirtazapine (REMERON) 15 MG tablet Take 1 tablet (15 mg total) by mouth every evening. 30 tablet 3    nystatin-triamcinolone (MYCOLOG) ointment AAA at corner of mouth BID 30 g 1    tretinoin (RETIN-A) 0.025 % cream Apply small amount to entire face qhs. Wash off qam and apply sunscreen. 45 g 3    valacyclovir (VALTREX) 1000 MG tablet Take 1 tablet (1,000 mg total) by mouth every 12 (twelve) hours. Take 2 tabs po BID x 1 day. Begin at first sign of outbreak 12 tablet 1    varenicline (CHANTIX) 1 mg Tab Take 1  "tablet (1 mg total) by mouth 2 (two) times daily. 60 tablet 2    VITAMIN B COMPLEX ORAL Take by mouth.      VYVANSE 50 mg capsule        No current facility-administered medications for this visit.                   REVIEW OF SYSTEMS:   No acute changes from previous encounter dated 9/23/2014 with exceptions mentioned in HPI.            PHYSICAL EXAM:  Vitals:    04/27/18 1108   BP: (!) 150/108   Pulse: 102   SpO2: 97%   Weight: (!) 154.4 kg (340 lb 6.2 oz)   Height: 6' 4" (1.93 m)   PainSc: 0-No pain     Body mass index is 41.43 kg/m².     GENERAL: Normal development, well groomed  HEENT:  Conjunctivae are non-erythematous; Pupils equal, round, and reactive to light; Nose is symmetrical; Nasal mucosa is pink and moist; Septum is midline; Inferior turbinates are normal; Nasal airflow is congested; Posterior pharynx is pink; Modified Mallampati: 4; Posterior palate is normal; Tonsils +2; Uvula is normal and pink;Tongue is normal; Dentition is fair; No TMJ tenderness; Jaw opening and protrusion without click and without discomfort.  NECK: Supple. Neck circumference is 16.75 inches. No thyromegaly. No palpable nodes.     SKIN: On face and neck: No abrasions, no rashes, no lesions.  No subcutaneous nodules are palpable.  RESPIRATORY: Chest is clear to auscultation.  Normal chest expansion and non-labored breathing at rest.  CARDIOVASCULAR: Normal S1, S2.  No murmurs, gallops or rubs. No carotid bruits bilaterally.  EXTREMITIES: No edema. No clubbing. No cyanosis. Station normal. Gait normal.        NEURO/PSYCH: Oriented to time, place and person. Normal attention span and concentration. Affect is full. Mood is normal.                                              DATA hst 11/10/14 ahi of 9    No results found for: TSH  ASSESSMENT    ICD-10-CM ICD-9-CM    1. JUSTO (obstructive sleep apnea) G47.33 327.23 CPAP/BIPAP SUPPLIES       PLAN:    Sleep Apnea -    Doing well with apap and nasal mask.  100%>4 hours.  Patient is " benefiting from apap.  Have not need chin strap.      Nasal congestion - currently on flonase, sinus irrigation and allegra.      Insomnia - sleep initiation improve.  Prn ativan.    Tobacco abuse - off chantix.  Still smoke 1 ppd.      htn - admit lots of stress at work.  bp at home 130/80.    Obesity - gained 20 lbs since 2017.  Seeing counselor to help with eating disorder.      Education: During our discussion today, we talked about the etiology of obstructive sleep apnea as well as the potential ramifications of untreated sleep apnea, which could include daytime sleepiness, hypertension, heart disease and/or stroke.      Precautions: The patient was advised to abstain from driving should they feel sleepy or drowsy.     Patient will Follow-up in about 1 year (around 4/27/2019).     This is 25 minutes visit, over 50% of time spent in direct consultation with patient.

## 2018-05-10 ENCOUNTER — OFFICE VISIT (OUTPATIENT)
Dept: PSYCHIATRY | Facility: CLINIC | Age: 33
End: 2018-05-10
Payer: COMMERCIAL

## 2018-05-10 VITALS
SYSTOLIC BLOOD PRESSURE: 162 MMHG | WEIGHT: 315 LBS | DIASTOLIC BLOOD PRESSURE: 99 MMHG | BODY MASS INDEX: 38.36 KG/M2 | HEIGHT: 76 IN | HEART RATE: 99 BPM

## 2018-05-10 DIAGNOSIS — F41.1 GENERALIZED ANXIETY DISORDER: ICD-10-CM

## 2018-05-10 DIAGNOSIS — G47.00 INSOMNIA DISORDER WITH NON-SLEEP DISORDER MENTAL COMORBIDITY: ICD-10-CM

## 2018-05-10 DIAGNOSIS — F41.0 PANIC DISORDER WITHOUT AGORAPHOBIA: ICD-10-CM

## 2018-05-10 DIAGNOSIS — F90.1 ATTENTION DEFICIT HYPERACTIVITY DISORDER (ADHD), PREDOMINANTLY HYPERACTIVE TYPE: ICD-10-CM

## 2018-05-10 DIAGNOSIS — F33.1 MODERATE EPISODE OF RECURRENT MAJOR DEPRESSIVE DISORDER: ICD-10-CM

## 2018-05-10 PROCEDURE — 3080F DIAST BP >= 90 MM HG: CPT | Mod: CPTII,S$GLB,, | Performed by: NURSE PRACTITIONER

## 2018-05-10 PROCEDURE — 90833 PSYTX W PT W E/M 30 MIN: CPT | Mod: S$GLB,,, | Performed by: NURSE PRACTITIONER

## 2018-05-10 PROCEDURE — 3008F BODY MASS INDEX DOCD: CPT | Mod: CPTII,S$GLB,, | Performed by: NURSE PRACTITIONER

## 2018-05-10 PROCEDURE — 99999 PR PBB SHADOW E&M-EST. PATIENT-LVL III: CPT | Mod: PBBFAC,,, | Performed by: NURSE PRACTITIONER

## 2018-05-10 PROCEDURE — 3077F SYST BP >= 140 MM HG: CPT | Mod: CPTII,S$GLB,, | Performed by: NURSE PRACTITIONER

## 2018-05-10 PROCEDURE — 99214 OFFICE O/P EST MOD 30 MIN: CPT | Mod: S$GLB,,, | Performed by: NURSE PRACTITIONER

## 2018-05-10 RX ORDER — LISDEXAMFETAMINE DIMESYLATE 30 MG/1
30 CAPSULE ORAL EVERY MORNING
Qty: 30 CAPSULE | Refills: 0 | Status: SHIPPED | OUTPATIENT
Start: 2018-05-10 | End: 2018-05-10 | Stop reason: SDUPTHER

## 2018-05-10 RX ORDER — MIRTAZAPINE 15 MG/1
15 TABLET, FILM COATED ORAL NIGHTLY
Qty: 30 TABLET | Refills: 5 | Status: SHIPPED | OUTPATIENT
Start: 2018-05-10 | End: 2018-12-07

## 2018-05-10 RX ORDER — LISDEXAMFETAMINE DIMESYLATE 30 MG/1
30 CAPSULE ORAL EVERY MORNING
Qty: 30 CAPSULE | Refills: 0 | Status: SHIPPED | OUTPATIENT
Start: 2018-06-10 | End: 2018-05-10 | Stop reason: SDUPTHER

## 2018-05-10 RX ORDER — LISDEXAMFETAMINE DIMESYLATE 30 MG/1
30 CAPSULE ORAL EVERY MORNING
Qty: 30 CAPSULE | Refills: 0 | Status: SHIPPED | OUTPATIENT
Start: 2018-07-10 | End: 2018-08-29 | Stop reason: SDUPTHER

## 2018-05-10 RX ORDER — LORAZEPAM 1 MG/1
TABLET ORAL
Qty: 30 TABLET | Refills: 5 | Status: SHIPPED | OUTPATIENT
Start: 2018-05-10 | End: 2018-12-07 | Stop reason: SDUPTHER

## 2018-05-10 RX ORDER — CITALOPRAM 40 MG/1
TABLET, FILM COATED ORAL
Qty: 90 TABLET | Refills: 1 | Status: SHIPPED | OUTPATIENT
Start: 2018-05-10 | End: 2018-12-07 | Stop reason: SDUPTHER

## 2018-05-10 RX ORDER — ARIPIPRAZOLE 5 MG/1
5 TABLET ORAL DAILY
Qty: 30 TABLET | Refills: 5 | Status: SHIPPED | OUTPATIENT
Start: 2018-05-10 | End: 2018-11-13 | Stop reason: SDUPTHER

## 2018-05-10 NOTE — PROGRESS NOTES
Outpatient Psychiatry Follow-Up Visit (MD/NP)    5/10/2018    Clinical Status of Patient:  Outpatient (Ambulatory)    Chief Complaint:  Ken Anderson is a 32 y.o. male who presents today for follow-up of depression, mood disorder, anxiety, attention problems and panic.  Met with patient      Last Visit: was on 3/09/18. Chart and  reviewed  .     Interval History and Content of Current Session:  Current Psych Medications  · Start Abilify 2 mg po daily.  Will increase to 5 mg next encounter if tolerated. Educated pt on potential side effects.   · Continue Celexa 40mg po q day  · Continue Ativan 1mg po q day prn  · Continue Vyvanse 30 mg po daily ( 2 months Rx printed)  · DC Lamictal  · Remeron 15 mg po q hs insomnia    Pt presents brighter affect and euthymic mood.  Reports improved sleep with Remeron; not groggy in the morning.  Tolerating Abilify without report of side-effects; no EPS; AIMS scale completed. Pt receptive to increase of Abilify to 5 mg daily.  He reports feeling better with Vyvanse 30 mg which was decreased from previous 50 mg Rx.  Functioning well at work and planning vacations for the summer.  Denies SI/HI/AVH.      Psychotherapy:  · Target symptoms: depression, distractability, anxiety   · Why chosen therapy is appropriate versus another modality: relevant to diagnosis  · Outcome monitoring methods: self-report  · Therapeutic intervention type: insight oriented psychotherapy  · Topics discussed/themes: symptom recognition  · The patient's response to the intervention is accepting. The patient's progress toward treatment goals is fair.   · Duration of intervention: 18 minutes.    Review of Systems     GENERAL: no weight change  SKIN: No rashes or lacerations   HEAD: No headaches   EYES: No exophthalmos, jaundice or blindness   EARS: No dizziness, tinnitus or hearing loss   NOSE: No changes in smell   MOUTH & THROAT: throat infection, currently on antibiotics.   CHEST: No shortness of  "breath, hyperventilation or cough   CARDIOVASCULAR: No tachycardia or chest pain   ABDOMEN: No nausea, vomiting, pain, constipation or diarrhea   URINARY: No frequency, dysuria or sexual dysfunction   ENDOCRINE: No polydipsia, polyuria   MUSCULOSKELETAL: No pain or stiffness of the joints   NEUROLOGIC: No weakness, sensory changes, seizures, confusion, memory loss, tremor or other abnormal movements      Psychiatric Review Of Systems:  sleep: yes  appetite changes: yes  weight changes: yes  energy/anergy: yes  interest/pleasure/anhedonia: yes  somatic symptoms: no  libido: yes  anxiety/panic: no      Past Medical, Family and Social History: The patient's past medical, family and social history have been reviewed and updated as appropriate within the electronic medical record - see encounter notes.    Compliance: yes    Side effects: see above    Risk Parameters:  Patient reports no suicidal ideation  Patient reports no homicidal ideation  Patient reports no self-injurious behavior  Patient reports no violent behavior    Exam (detailed: at least 9 elements; comprehensive: all 15 elements)   Constitutional  Vitals:  Most recent vital signs, dated greater than 90 days prior to this appointment, were reviewed.   Vitals:    05/10/18 1454   BP: (!) 162/99   Pulse: 99   Weight: (!) 158 kg (348 lb 7 oz)   Height: 6' 4" (1.93 m)        General:  unremarkable, age appropriate, well dressed, neatly groomed     Musculoskeletal  Muscle Strength/Tone:  no dyskinesia, no dystonia, no tremor, no tic   Gait & Station:  non-ataxic     Psychiatric  Speech:  no latency; no press   Mood & Affect:  dysthymic  blunted   Thought Process:  normal and logical   Associations:  intact   Thought Content:  normal, no suicidality, no homicidality, delusions, or paranoia   Insight:  has awareness of illness   Judgement: behavior is adequate to circumstances   Orientation:  grossly intact   Memory: intact for content of interview   Language: " grossly intact   Attention Span & Concentration:  able to focus   Fund of Knowledge:  intact and appropriate to age and level of education     Assessment and Diagnosis   Status/Progress: Based on the examination today, the patient's problem(s) is/are improved and adequately but not ideally controlled.  New problems have not been presented today.   Co-morbidities are not complicating management of the primary condition.  There are no active rule-out diagnoses for this patient at this time.     General Impression:       ICD-10-CM ICD-9-CM   1. Attention deficit hyperactivity disorder (ADHD), predominantly hyperactive type F90.1 314.01   2. Moderate episode of recurrent major depressive disorder F33.1 296.32   3. Insomnia disorder with non-sleep disorder mental comorbidity G47.00 780.52   4. Generalized anxiety disorder F41.1 300.02   5. Panic disorder without agoraphobia F41.0 300.01       Intervention/Counseling/Treatment Plan   · Medication Management: Continue current medications. The risks and benefits of medication were discussed with the patient.   · Increase to Abilify 5 mg po daily  · Continue Celexa 40mg po q day  · Continue Ativan 1mg po q day prn  · Continue Remeron 15 mg po q hs insomnia  · Continue Vyvanse 30 mg po daily ( 3 months Rx printed)  · Continue individual psychotherapy with Debora Zamora LPC  · Completed AIMS scale  · Reviewed Safety Plan and emergency contacts.     Return to Clinic: 6 months

## 2018-05-25 DIAGNOSIS — D84.9 IMMUNOSUPPRESSION: ICD-10-CM

## 2018-05-25 DIAGNOSIS — I10 ESSENTIAL HYPERTENSION: ICD-10-CM

## 2018-05-25 RX ORDER — VALACYCLOVIR HYDROCHLORIDE 1 G/1
TABLET, FILM COATED ORAL
Qty: 12 TABLET | Refills: 0 | Status: SHIPPED | OUTPATIENT
Start: 2018-05-25 | End: 2021-04-05 | Stop reason: SDUPTHER

## 2018-05-25 RX ORDER — AMLODIPINE BESYLATE 10 MG/1
TABLET ORAL
Qty: 90 TABLET | Refills: 0 | Status: SHIPPED | OUTPATIENT
Start: 2018-05-25 | End: 2018-08-16 | Stop reason: SDUPTHER

## 2018-06-03 DIAGNOSIS — F33.1 MODERATE EPISODE OF RECURRENT MAJOR DEPRESSIVE DISORDER: ICD-10-CM

## 2018-06-03 RX ORDER — ARIPIPRAZOLE 2 MG/1
TABLET ORAL
Qty: 30 TABLET | Refills: 0 | OUTPATIENT
Start: 2018-06-03

## 2018-07-20 DIAGNOSIS — G47.00 INSOMNIA DISORDER WITH NON-SLEEP DISORDER MENTAL COMORBIDITY: ICD-10-CM

## 2018-07-20 RX ORDER — TRAZODONE HYDROCHLORIDE 100 MG/1
TABLET ORAL
Qty: 30 TABLET | Refills: 0 | Status: SHIPPED | OUTPATIENT
Start: 2018-07-20 | End: 2018-08-16 | Stop reason: SDUPTHER

## 2018-08-16 DIAGNOSIS — I10 ESSENTIAL HYPERTENSION: ICD-10-CM

## 2018-08-16 DIAGNOSIS — G47.00 INSOMNIA DISORDER WITH NON-SLEEP DISORDER MENTAL COMORBIDITY: ICD-10-CM

## 2018-08-16 RX ORDER — AMLODIPINE BESYLATE 10 MG/1
TABLET ORAL
Qty: 90 TABLET | Refills: 0 | Status: SHIPPED | OUTPATIENT
Start: 2018-08-16 | End: 2018-11-13 | Stop reason: SDUPTHER

## 2018-08-16 RX ORDER — TRAZODONE HYDROCHLORIDE 100 MG/1
TABLET ORAL
Qty: 30 TABLET | Refills: 0 | Status: SHIPPED | OUTPATIENT
Start: 2018-08-16 | End: 2018-12-07 | Stop reason: SDUPTHER

## 2018-08-29 ENCOUNTER — PATIENT MESSAGE (OUTPATIENT)
Dept: PSYCHIATRY | Facility: CLINIC | Age: 33
End: 2018-08-29

## 2018-08-29 DIAGNOSIS — F90.1 ATTENTION DEFICIT HYPERACTIVITY DISORDER (ADHD), PREDOMINANTLY HYPERACTIVE TYPE: ICD-10-CM

## 2018-08-29 RX ORDER — LISDEXAMFETAMINE DIMESYLATE 30 MG/1
30 CAPSULE ORAL EVERY MORNING
Qty: 30 CAPSULE | Refills: 0 | Status: SHIPPED | OUTPATIENT
Start: 2018-08-29 | End: 2018-09-27 | Stop reason: SDUPTHER

## 2018-09-27 ENCOUNTER — PATIENT MESSAGE (OUTPATIENT)
Dept: PSYCHIATRY | Facility: CLINIC | Age: 33
End: 2018-09-27

## 2018-09-27 DIAGNOSIS — F90.1 ATTENTION DEFICIT HYPERACTIVITY DISORDER (ADHD), PREDOMINANTLY HYPERACTIVE TYPE: ICD-10-CM

## 2018-09-27 RX ORDER — LISDEXAMFETAMINE DIMESYLATE 30 MG/1
30 CAPSULE ORAL EVERY MORNING
Qty: 30 CAPSULE | Refills: 0 | Status: SHIPPED | OUTPATIENT
Start: 2018-09-27 | End: 2018-10-26 | Stop reason: SDUPTHER

## 2018-10-24 ENCOUNTER — OFFICE VISIT (OUTPATIENT)
Dept: PODIATRY | Facility: CLINIC | Age: 33
End: 2018-10-24
Payer: COMMERCIAL

## 2018-10-24 VITALS
BODY MASS INDEX: 42.66 KG/M2 | DIASTOLIC BLOOD PRESSURE: 88 MMHG | HEART RATE: 67 BPM | SYSTOLIC BLOOD PRESSURE: 134 MMHG | HEIGHT: 72 IN | WEIGHT: 315 LBS

## 2018-10-24 DIAGNOSIS — L03.032 PARONYCHIA, TOE, LEFT: ICD-10-CM

## 2018-10-24 DIAGNOSIS — M79.675 TOE PAIN, LEFT: ICD-10-CM

## 2018-10-24 DIAGNOSIS — L60.0 INGROWN NAIL: Primary | ICD-10-CM

## 2018-10-24 PROCEDURE — 99203 OFFICE O/P NEW LOW 30 MIN: CPT | Mod: S$GLB,,, | Performed by: PODIATRIST

## 2018-10-24 PROCEDURE — 99999 PR PBB SHADOW E&M-EST. PATIENT-LVL III: CPT | Mod: PBBFAC,,, | Performed by: PODIATRIST

## 2018-10-24 PROCEDURE — 99213 OFFICE O/P EST LOW 20 MIN: CPT | Mod: PBBFAC | Performed by: PODIATRIST

## 2018-10-24 RX ORDER — ENOXAPARIN SODIUM 150 MG/ML
INJECTION SUBCUTANEOUS
Refills: 0 | COMMUNITY
Start: 2018-10-02 | End: 2019-12-04

## 2018-10-24 RX ORDER — ENOXAPARIN SODIUM 100 MG/ML
INJECTION SUBCUTANEOUS
Refills: 0 | COMMUNITY
Start: 2018-09-13 | End: 2019-12-04

## 2018-10-24 RX ORDER — TOBRAMYCIN 3 MG/ML
SOLUTION/ DROPS OPHTHALMIC
Qty: 5 ML | Refills: 3 | Status: SHIPPED | OUTPATIENT
Start: 2018-10-24 | End: 2019-12-04

## 2018-10-24 NOTE — PROGRESS NOTES
Subjective:      Patient ID: Ken Anderson is a 33 y.o. male.    Chief Complaint: Ingrown Toenail    Sharp deep pain left big toe from ingrown nail.  Gradual onset, worsening over past several weeks, aggravated by increased weight bearing, shoe gear, pressure.  No previous medical treatment.  OTC pain med not helping. Denies traum, surgery left big toe.    Review of Systems   Constitution: Negative for chills, diaphoresis, fever, malaise/fatigue and night sweats.   Cardiovascular: Negative for claudication, cyanosis, leg swelling and syncope.   Skin: Positive for nail changes. Negative for color change, dry skin, rash, suspicious lesions and unusual hair distribution.   Musculoskeletal: Negative for falls, joint pain, joint swelling, muscle cramps, muscle weakness and stiffness.   Gastrointestinal: Negative for constipation, diarrhea, nausea and vomiting.   Neurological: Negative for brief paralysis, disturbances in coordination, focal weakness, numbness, paresthesias, sensory change and tremors.           Objective:      Physical Exam   Constitutional: He is oriented to person, place, and time. He appears well-developed and well-nourished. He is cooperative. No distress.   Cardiovascular:   Pulses:       Popliteal pulses are 2+ on the right side, and 2+ on the left side.        Dorsalis pedis pulses are 2+ on the right side, and 2+ on the left side.        Posterior tibial pulses are 2+ on the right side, and 2+ on the left side.   Capillary refill 3 seconds all toes/distal feet, all toes/both feet warm to touch.      Negative lymphadenopathy bilateral popliteal fossa and tarsal tunnel.      Negavie lower extremity edema bilateral.     Musculoskeletal:        Right ankle: He exhibits normal range of motion, no swelling, no ecchymosis, no deformity, no laceration and normal pulse. Achilles tendon normal. Achilles tendon exhibits no pain, no defect and normal Becker's test results.   Normal angle, base,  station of gait. All ten toes without clubbing, cyanosis, or signs of ischemia.  No pain to palpation bilateral lower extremities.  Range of motion, stability, muscle strength, and muscle tone normal bilateral feet and legs.     Lymphadenopathy: No inguinal adenopathy noted on the right or left side.   Negative lymphadenopathy bilateral popliteal fossa and tarsal tunnel.    Negative lymphangitic streaking bilateral feet/ankles/legs.   Neurological: He is alert and oriented to person, place, and time. He has normal strength. He displays no atrophy and no tremor. No sensory deficit. He exhibits normal muscle tone. Gait normal.   Reflex Scores:       Patellar reflexes are 2+ on the right side and 2+ on the left side.       Achilles reflexes are 2+ on the right side and 2+ on the left side.  Negative tinel sign to percussion sural, superficial peroneal, deep peroneal, saphenous, and posterior tibial nerves right and left ankles and feet.     Skin: Skin is warm, dry and intact. Capillary refill takes 2 to 3 seconds. No abrasion, no bruising, no burn, no ecchymosis, no laceration, no lesion and no rash noted. He is not diaphoretic. No cyanosis or erythema. No pallor. Nails show no clubbing.   Visible and palpable ingrowth of toenail lateral border left hallux with pain to palpation, and focal localized erythema and edema,  without ulceration, drainage, pus, tracking, fluctuance, malodor, or cardinal signs infection.    Otherwise,   Skin is normal age and health appropriate color, turgor, texture, and temperature bilateral lower extremities without ulceration, hyperpigmentation, discoloration, masses nodules or cords palpated.  No ecchymosis, erythema, edema, or cardinal signs of infection bilateral lower extremities.       Psychiatric: He has a normal mood and affect.             Assessment:       Encounter Diagnoses   Name Primary?    Ingrown nail Yes    Paronychia, toe, left     Toe pain, left          Plan:        Ken was seen today for ingrown toenail.    Diagnoses and all orders for this visit:    Ingrown nail    Paronychia, toe, left    Toe pain, left    Other orders  -     tobramycin sulfate 0.3% (TOBREX) 0.3 % ophthalmic solution; 1-2 drops topically twice daily to affected toe(s).      I counseled the patient on his conditions, their implications and medical management.    Rx tobramycin drops bid.  Cover left hallux all times with band aid dressings changing daily.    Utilizing sterile toenail clippers I aggressively debrided the offending nail border approximately 3 mm from its edge and carried the nail plate incision down at an angle in order to wedge out the offending cryptotic portion of the nail plate. The offending border was then removed in toto. The area was cleansed with alcohol. Patient tolerated the procedure well and related significant relief.      Discussed conservative treatment with shoes of adequate dimensions, material, and style to alleviate symptoms and delay or prevent surgical intervention.     Follow-up in about 1 week (around 10/31/2018).

## 2018-10-24 NOTE — LETTER
October 24, 2018      Felix Adorno MD  [address]  [place] [address]           [facility]  [address]  [place] [address]  Phone: [phone]          Patient: Ken Anderson   MR Number: [id number]   YOB: 1985   Date of Visit: 10/24/2018       Dear Dr. Felix Adorno:    Thank you for referring Ken Anderson to me for evaluation. Attached you will find relevant portions of my assessment and plan of care.    If you have questions, please do not hesitate to call me. I look forward to following Ken Anderson along with you.    Sincerely,    Danny Casillas, DPSTEVE    Enclosure  CC:  No Recipients    If you would like to receive this communication electronically, please contact [email or web address] or [phone] to request more information on [facility] access.    For providers and/or their staff who would like to refer a patient to [facility], please contact us through our one-stop-shop provider referral line, [facility] , at [phone].    If you feel you have received this communication in error or would no longer like to receive these types of communications, please e-mail [email or web address]

## 2018-10-25 ENCOUNTER — PATIENT MESSAGE (OUTPATIENT)
Dept: PSYCHIATRY | Facility: CLINIC | Age: 33
End: 2018-10-25

## 2018-10-26 ENCOUNTER — OFFICE VISIT (OUTPATIENT)
Dept: OPTOMETRY | Facility: CLINIC | Age: 33
End: 2018-10-26
Payer: COMMERCIAL

## 2018-10-26 DIAGNOSIS — H52.7 REFRACTIVE ERROR: Primary | ICD-10-CM

## 2018-10-26 DIAGNOSIS — H31.001 CHORIORETINAL SCAR OF RIGHT EYE: ICD-10-CM

## 2018-10-26 DIAGNOSIS — F90.1 ATTENTION DEFICIT HYPERACTIVITY DISORDER (ADHD), PREDOMINANTLY HYPERACTIVE TYPE: ICD-10-CM

## 2018-10-26 PROCEDURE — 92015 DETERMINE REFRACTIVE STATE: CPT | Mod: S$GLB,,, | Performed by: OPTOMETRIST

## 2018-10-26 PROCEDURE — 99999 PR PBB SHADOW E&M-EST. PATIENT-LVL II: CPT | Mod: PBBFAC,,, | Performed by: OPTOMETRIST

## 2018-10-26 PROCEDURE — 92014 COMPRE OPH EXAM EST PT 1/>: CPT | Mod: S$GLB,,, | Performed by: OPTOMETRIST

## 2018-10-26 RX ORDER — LISDEXAMFETAMINE DIMESYLATE 30 MG/1
30 CAPSULE ORAL EVERY MORNING
Qty: 30 CAPSULE | Refills: 0 | Status: SHIPPED | OUTPATIENT
Start: 2018-10-26 | End: 2018-11-27 | Stop reason: SDUPTHER

## 2018-10-26 NOTE — MEDICAL/APP STUDENT
General Eye Exam  No changes in vision noted since last exam.  Current rx 1 year old. No complaints with vision.     Eye infection about years ago right eye.

## 2018-10-26 NOTE — PROGRESS NOTES
HPI     General Eye Exam  No changes in vision noted since last exam.  Current rx 1 year old. No complaints with vision.      Eye infection about years ago right eye.         Last edited by Rey Kitchen, OD on 10/26/2018  9:05 AM. (History)              Assessment /Plan     For exam results, see Encounter Report.    Refractive error    Chorioretinal scar of right eye      1. Spec Rx given. Different lens options discussed with patient. RTC 1 year full exam.  2. Monitor condition. Patient to report any changes. RTC 1 year recheck.

## 2018-11-13 DIAGNOSIS — I10 ESSENTIAL HYPERTENSION: ICD-10-CM

## 2018-11-13 DIAGNOSIS — F33.1 MODERATE EPISODE OF RECURRENT MAJOR DEPRESSIVE DISORDER: ICD-10-CM

## 2018-11-13 RX ORDER — AMLODIPINE BESYLATE 10 MG/1
TABLET ORAL
Qty: 90 TABLET | Refills: 0 | Status: SHIPPED | OUTPATIENT
Start: 2018-11-13 | End: 2019-02-05 | Stop reason: SDUPTHER

## 2018-11-13 RX ORDER — ARIPIPRAZOLE 5 MG/1
TABLET ORAL
Qty: 30 TABLET | Refills: 0 | Status: SHIPPED | OUTPATIENT
Start: 2018-11-13 | End: 2018-12-07

## 2018-11-27 ENCOUNTER — PATIENT MESSAGE (OUTPATIENT)
Dept: PSYCHIATRY | Facility: CLINIC | Age: 33
End: 2018-11-27

## 2018-11-27 DIAGNOSIS — F90.1 ATTENTION DEFICIT HYPERACTIVITY DISORDER (ADHD), PREDOMINANTLY HYPERACTIVE TYPE: ICD-10-CM

## 2018-11-27 RX ORDER — LISDEXAMFETAMINE DIMESYLATE 30 MG/1
30 CAPSULE ORAL EVERY MORNING
Qty: 30 CAPSULE | Refills: 0 | Status: SHIPPED | OUTPATIENT
Start: 2018-11-27 | End: 2018-12-07 | Stop reason: SDUPTHER

## 2018-12-07 ENCOUNTER — OFFICE VISIT (OUTPATIENT)
Dept: PSYCHIATRY | Facility: CLINIC | Age: 33
End: 2018-12-07
Payer: COMMERCIAL

## 2018-12-07 VITALS
HEIGHT: 72 IN | SYSTOLIC BLOOD PRESSURE: 138 MMHG | BODY MASS INDEX: 42.66 KG/M2 | WEIGHT: 315 LBS | HEART RATE: 83 BPM | DIASTOLIC BLOOD PRESSURE: 84 MMHG

## 2018-12-07 DIAGNOSIS — F41.1 GENERALIZED ANXIETY DISORDER: ICD-10-CM

## 2018-12-07 DIAGNOSIS — F41.0 PANIC DISORDER WITHOUT AGORAPHOBIA: ICD-10-CM

## 2018-12-07 DIAGNOSIS — G47.00 INSOMNIA DISORDER WITH NON-SLEEP DISORDER MENTAL COMORBIDITY: ICD-10-CM

## 2018-12-07 DIAGNOSIS — F90.1 ATTENTION DEFICIT HYPERACTIVITY DISORDER (ADHD), PREDOMINANTLY HYPERACTIVE TYPE: ICD-10-CM

## 2018-12-07 DIAGNOSIS — F33.1 MODERATE EPISODE OF RECURRENT MAJOR DEPRESSIVE DISORDER: ICD-10-CM

## 2018-12-07 PROCEDURE — 90833 PSYTX W PT W E/M 30 MIN: CPT | Mod: S$GLB,,, | Performed by: NURSE PRACTITIONER

## 2018-12-07 PROCEDURE — 99999 PR PBB SHADOW E&M-EST. PATIENT-LVL III: CPT | Mod: PBBFAC,,, | Performed by: NURSE PRACTITIONER

## 2018-12-07 PROCEDURE — 3079F DIAST BP 80-89 MM HG: CPT | Mod: CPTII,S$GLB,, | Performed by: NURSE PRACTITIONER

## 2018-12-07 PROCEDURE — 99214 OFFICE O/P EST MOD 30 MIN: CPT | Mod: S$GLB,,, | Performed by: NURSE PRACTITIONER

## 2018-12-07 PROCEDURE — 3075F SYST BP GE 130 - 139MM HG: CPT | Mod: CPTII,S$GLB,, | Performed by: NURSE PRACTITIONER

## 2018-12-07 PROCEDURE — 3008F BODY MASS INDEX DOCD: CPT | Mod: CPTII,S$GLB,, | Performed by: NURSE PRACTITIONER

## 2018-12-07 RX ORDER — LISDEXAMFETAMINE DIMESYLATE 30 MG/1
30 CAPSULE ORAL EVERY MORNING
Qty: 30 CAPSULE | Refills: 0 | Status: SHIPPED | OUTPATIENT
Start: 2018-12-07 | End: 2018-12-07 | Stop reason: SDUPTHER

## 2018-12-07 RX ORDER — LISDEXAMFETAMINE DIMESYLATE 30 MG/1
30 CAPSULE ORAL EVERY MORNING
Qty: 30 CAPSULE | Refills: 0 | Status: SHIPPED | OUTPATIENT
Start: 2019-01-07 | End: 2018-12-07 | Stop reason: SDUPTHER

## 2018-12-07 RX ORDER — TRAZODONE HYDROCHLORIDE 100 MG/1
TABLET ORAL
Qty: 90 TABLET | Refills: 1 | Status: SHIPPED | OUTPATIENT
Start: 2018-12-07 | End: 2019-01-08 | Stop reason: SDUPTHER

## 2018-12-07 RX ORDER — BUPROPION HYDROCHLORIDE 300 MG/1
300 TABLET ORAL DAILY
Qty: 90 TABLET | Refills: 1 | Status: SHIPPED | OUTPATIENT
Start: 2018-12-14 | End: 2019-01-08 | Stop reason: SDUPTHER

## 2018-12-07 RX ORDER — BUPROPION HYDROCHLORIDE 150 MG/1
150 TABLET ORAL DAILY
Qty: 7 TABLET | Refills: 0 | Status: SHIPPED | OUTPATIENT
Start: 2018-12-07 | End: 2018-12-14

## 2018-12-07 RX ORDER — CITALOPRAM 40 MG/1
TABLET, FILM COATED ORAL
Qty: 90 TABLET | Refills: 1 | Status: SHIPPED | OUTPATIENT
Start: 2018-12-07 | End: 2019-01-08 | Stop reason: SDUPTHER

## 2018-12-07 RX ORDER — LISDEXAMFETAMINE DIMESYLATE 30 MG/1
30 CAPSULE ORAL EVERY MORNING
Qty: 30 CAPSULE | Refills: 0 | Status: SHIPPED | OUTPATIENT
Start: 2019-02-07 | End: 2019-03-29 | Stop reason: SDUPTHER

## 2018-12-07 RX ORDER — LORAZEPAM 1 MG/1
TABLET ORAL
Qty: 30 TABLET | Refills: 5 | Status: SHIPPED | OUTPATIENT
Start: 2018-12-07 | End: 2019-05-31 | Stop reason: SDUPTHER

## 2018-12-07 NOTE — PROGRESS NOTES
"Outpatient Psychiatry Follow-Up Visit (MD/NP)    12/7/2018    Clinical Status of Patient:  Outpatient (Ambulatory)    Chief Complaint:  Ken Anderson is a 33 y.o. male who presents today for follow-up of depression, mood disorder, anxiety, attention problems and panic.  Met with patient      Last Visit: was on 5/10/18. Chart and  reviewed  .     Interval History and Content of Current Session:  Current Psych Medications  · Increase to Abilify 5 mg po daily  · Continue Celexa 40mg po q day  · Continue Ativan 1mg po q day prn  · Continue Remeron 15 mg po q hs insomnia  · Continue Vyvanse 30 mg po daily ( 3 months Rx printed)    Pt presents for evaluation subsequent to PP message:     I hope this message finds you well.   I reach out for two reasons.  The first is because I run out of Vyvanse on Monday and am hoping you can call me in a refill to the [facility] on [address].     Also, I have attempted to get an apt with you before my next appointment on Dec. 8th but nothing appears open on [facility].  Is there some other way to get in to see you sooner?  If not, I will wait until then.  I am concerned that I am having mood issues. Particularly I am sleeping too much (12 hours daily without medication),   I am having recurrent, illogical negative thoughts about the future, and my sister, my parents,  my fiancee and my talk therapist all seem to independently that I am "flat" in my affect.     I am not sure if I am having chemical or situation related issues.   All of these symptoms (except over-sleeping, which I never did before) appeared after a diagnosis of deep vein thrombosis about a month ago.  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  So I have had a lot to process.   If you have questions, please contact me at [phone].  Otherwise I will look forward to getting some more medication and seeing you next time in the office.  lissJ Carlos nita carlos     Pt reports symptoms " of depression: avolition, apathy, anhedonia, and sleep disruption.  Denies SI/HI/AVH.  Denies panic attacks.  No alcohol or substance abuse.  Thought processes are clear and organized.  Reports hx of good response to Wellbutrin.  Requested to switch from Remeron to Trazodone.  Will stop Abilify since risks for side effects currently outweigh benefit.  Will restart Wellbutrin and Trazodone and continue Celexa.     Psychotherapy:  · Target symptoms: depression, distractability, anxiety   · Why chosen therapy is appropriate versus another modality: relevant to diagnosis  · Outcome monitoring methods: self-report  · Therapeutic intervention type: insight oriented psychotherapy  · Topics discussed/themes: symptom recognition  · The patient's response to the intervention is accepting. The patient's progress toward treatment goals is fair.   · Duration of intervention: 20 minutes.    Review of Systems     GENERAL: no weight change  SKIN: No rashes or lacerations   HEAD: No headaches   EYES: No exophthalmos, jaundice or blindness   EARS: No dizziness, tinnitus or hearing loss   NOSE: No changes in smell   MOUTH & THROAT: throat infection, currently on antibiotics.   CHEST: No shortness of breath, hyperventilation or cough   CARDIOVASCULAR: No tachycardia or chest pain   ABDOMEN: No nausea, vomiting, pain, constipation or diarrhea   URINARY: No frequency, dysuria or sexual dysfunction   ENDOCRINE: No polydipsia, polyuria   MUSCULOSKELETAL: No pain or stiffness of the joints   NEUROLOGIC: No weakness, sensory changes, seizures, confusion, memory loss, tremor or other abnormal movements      Psychiatric Review Of Systems:  sleep: yes  appetite changes: yes  weight changes: yes  energy/anergy: yes  interest/pleasure/anhedonia: yes  somatic symptoms: no  libido: yes  anxiety/panic: no    Past Medical, Family and Social History: The patient's past medical, family and social history have been reviewed and updated as appropriate  within the electronic medical record - see encounter notes.    Compliance: yes    Side effects: see above    Risk Parameters:  Patient reports no suicidal ideation  Patient reports no homicidal ideation  Patient reports no self-injurious behavior  Patient reports no violent behavior    Exam (detailed: at least 9 elements; comprehensive: all 15 elements)   Constitutional  Vitals:  Most recent vital signs, dated greater than 90 days prior to this appointment, were reviewed.   Vitals:    12/07/18 1421   BP: 138/84   Pulse: 83   Weight: (!) 161 kg (354 lb 15.1 oz)   Height: 6' (1.829 m)        General:  unremarkable, age appropriate, well dressed, neatly groomed     Musculoskeletal  Muscle Strength/Tone:  no dyskinesia, no dystonia, no tremor, no tic   Gait & Station:  non-ataxic     Psychiatric  Speech:  no latency; no press   Mood & Affect:  dysthymic  blunted   Thought Process:  normal and logical   Associations:  intact   Thought Content:  normal, no suicidality, no homicidality, delusions, or paranoia   Insight:  has awareness of illness   Judgement: behavior is adequate to circumstances   Orientation:  grossly intact   Memory: intact for content of interview   Language: grossly intact   Attention Span & Concentration:  able to focus   Fund of Knowledge:  intact and appropriate to age and level of education     Assessment and Diagnosis   Status/Progress: Based on the examination today, the patient's problem(s) is/are inadequately controlled and worsening.  New problems have not been presented today.   Co-morbidities are not complicating management of the primary condition.  There are no active rule-out diagnoses for this patient at this time.     General Impression:       ICD-10-CM ICD-9-CM   1. Generalized anxiety disorder F41.1 300.02   2. Panic disorder without agoraphobia F41.0 300.01   3. Moderate episode of recurrent major depressive disorder F33.1 296.32   4. Insomnia disorder with non-sleep disorder mental  comorbidity G47.00 780.52   5. Attention deficit hyperactivity disorder (ADHD), predominantly hyperactive type F90.1 314.01       Intervention/Counseling/Treatment Plan   · Medication Management: Continue current medications. The risks and benefits of medication were discussed with the patient.   · DC Abilify   · Continue Celexa 40mg po q day  · Continue Ativan 1mg po q day prn  · DC Remeron   · Continue Vyvanse 30 mg po daily ( 3 months Rx printed)  · Start Wellbutrin  mg po daily x 7 days then increase to 300 mg   · Restart Trazodone 100 mg po q hs PRN insomnia  · Continue individual psychotherapy with Debora Zamora LPC  · Reviewed Safety Plan and emergency contacts.     Return to Clinic: 1 month, as scheduled

## 2018-12-18 ENCOUNTER — OFFICE VISIT (OUTPATIENT)
Dept: INTERNAL MEDICINE | Facility: CLINIC | Age: 33
End: 2018-12-18
Payer: COMMERCIAL

## 2018-12-18 VITALS
BODY MASS INDEX: 38.36 KG/M2 | WEIGHT: 315 LBS | DIASTOLIC BLOOD PRESSURE: 84 MMHG | SYSTOLIC BLOOD PRESSURE: 136 MMHG | HEIGHT: 76 IN | OXYGEN SATURATION: 96 % | HEART RATE: 81 BPM

## 2018-12-18 DIAGNOSIS — Z11.3 SCREEN FOR STD (SEXUALLY TRANSMITTED DISEASE): ICD-10-CM

## 2018-12-18 DIAGNOSIS — I10 HYPERTENSION, UNSPECIFIED TYPE: ICD-10-CM

## 2018-12-18 DIAGNOSIS — E78.5 HYPERLIPIDEMIA, UNSPECIFIED HYPERLIPIDEMIA TYPE: Primary | ICD-10-CM

## 2018-12-18 PROCEDURE — 99999 PR PBB SHADOW E&M-EST. PATIENT-LVL V: CPT | Mod: PBBFAC,,, | Performed by: INTERNAL MEDICINE

## 2018-12-18 PROCEDURE — 3008F BODY MASS INDEX DOCD: CPT | Mod: CPTII,S$GLB,, | Performed by: INTERNAL MEDICINE

## 2018-12-18 PROCEDURE — 99214 OFFICE O/P EST MOD 30 MIN: CPT | Mod: S$GLB,,, | Performed by: INTERNAL MEDICINE

## 2018-12-18 PROCEDURE — 3079F DIAST BP 80-89 MM HG: CPT | Mod: CPTII,S$GLB,, | Performed by: INTERNAL MEDICINE

## 2018-12-18 PROCEDURE — 3075F SYST BP GE 130 - 139MM HG: CPT | Mod: CPTII,S$GLB,, | Performed by: INTERNAL MEDICINE

## 2018-12-19 ENCOUNTER — PATIENT MESSAGE (OUTPATIENT)
Dept: INTERNAL MEDICINE | Facility: CLINIC | Age: 33
End: 2018-12-19

## 2018-12-19 ENCOUNTER — LAB VISIT (OUTPATIENT)
Dept: LAB | Facility: HOSPITAL | Age: 33
End: 2018-12-19
Attending: INTERNAL MEDICINE
Payer: COMMERCIAL

## 2018-12-19 DIAGNOSIS — E78.5 HYPERLIPIDEMIA, UNSPECIFIED HYPERLIPIDEMIA TYPE: ICD-10-CM

## 2018-12-19 DIAGNOSIS — Z11.3 SCREEN FOR STD (SEXUALLY TRANSMITTED DISEASE): ICD-10-CM

## 2018-12-19 LAB
ALBUMIN SERPL BCP-MCNC: 3.6 G/DL
ALP SERPL-CCNC: 79 U/L
ALT SERPL W/O P-5'-P-CCNC: 39 U/L
ANION GAP SERPL CALC-SCNC: 9 MMOL/L
AST SERPL-CCNC: 18 U/L
BILIRUB SERPL-MCNC: 0.5 MG/DL
BUN SERPL-MCNC: 13 MG/DL
CALCIUM SERPL-MCNC: 9 MG/DL
CHLORIDE SERPL-SCNC: 109 MMOL/L
CHOLEST SERPL-MCNC: 176 MG/DL
CHOLEST/HDLC SERPL: 5.3 {RATIO}
CO2 SERPL-SCNC: 21 MMOL/L
CREAT SERPL-MCNC: 1 MG/DL
EST. GFR  (AFRICAN AMERICAN): >60 ML/MIN/1.73 M^2
EST. GFR  (NON AFRICAN AMERICAN): >60 ML/MIN/1.73 M^2
GLUCOSE SERPL-MCNC: 102 MG/DL
HDLC SERPL-MCNC: 33 MG/DL
HDLC SERPL: 18.8 %
HIV 1+2 AB+HIV1 P24 AG SERPL QL IA: NEGATIVE
LDLC SERPL CALC-MCNC: 114.2 MG/DL
NONHDLC SERPL-MCNC: 143 MG/DL
POTASSIUM SERPL-SCNC: 4.1 MMOL/L
PROT SERPL-MCNC: 6.8 G/DL
SODIUM SERPL-SCNC: 139 MMOL/L
TRIGL SERPL-MCNC: 144 MG/DL

## 2018-12-19 PROCEDURE — 36415 COLL VENOUS BLD VENIPUNCTURE: CPT

## 2018-12-19 PROCEDURE — 80053 COMPREHEN METABOLIC PANEL: CPT

## 2018-12-19 PROCEDURE — 80061 LIPID PANEL: CPT

## 2018-12-19 PROCEDURE — 86703 HIV-1/HIV-2 1 RESULT ANTBDY: CPT

## 2018-12-24 NOTE — PROGRESS NOTES
Subjective:       Patient ID: Ken Anderson is a 33 y.o. male.    Chief Complaint: Hypertension    HPI  He returns for management of hypertension.  He has had hypertension for over a year.  Current treatment has included medications outlined in medication list.  He denies chest pain or shortness of breath.  No palpitations.  Denies left arm or neck pain. He had blood work done at [facility] and was told his triglycerides were very high    Past medical history:  Hypertension, attention deficit disorder, depression, aplastic anemia, PNH, sleep apnea, DVT    Medications:  See med list    No known drug allergies      Review of Systems   Constitutional: Negative for chills, fatigue, fever and unexpected weight change.   Respiratory: Negative for chest tightness and shortness of breath.    Cardiovascular: Negative for chest pain and palpitations.   Gastrointestinal: Negative for abdominal pain and blood in stool.   Neurological: Negative for dizziness, syncope, numbness and headaches.       Objective:      Physical Exam   HENT:   Right Ear: External ear normal.   Left Ear: External ear normal.   Nose: Nose normal.   Mouth/Throat: Oropharynx is clear and moist.   Eyes: Pupils are equal, round, and reactive to light.   Neck: Normal range of motion.   Cardiovascular: Normal rate and regular rhythm.   No murmur heard.  Pulmonary/Chest: Breath sounds normal.   Abdominal: He exhibits no distension. There is no hepatomegaly. There is no tenderness.   Lymphadenopathy:     He has no cervical adenopathy.     He has no axillary adenopathy.   Neurological: He has normal strength and normal reflexes. No cranial nerve deficit or sensory deficit.       Assessment/Plan       Assessment and plan:  1.  Hypertension:  Controlled  2. hyperlipidemia:  Check CMP and lipid panel  3.  He will continue to follow up with his physicians at [facility] for all other issues

## 2019-01-08 ENCOUNTER — OFFICE VISIT (OUTPATIENT)
Dept: PSYCHIATRY | Facility: CLINIC | Age: 34
End: 2019-01-08
Payer: COMMERCIAL

## 2019-01-08 VITALS
HEIGHT: 76 IN | SYSTOLIC BLOOD PRESSURE: 132 MMHG | BODY MASS INDEX: 38.36 KG/M2 | HEART RATE: 71 BPM | WEIGHT: 315 LBS | DIASTOLIC BLOOD PRESSURE: 74 MMHG

## 2019-01-08 DIAGNOSIS — G47.00 INSOMNIA DISORDER WITH NON-SLEEP DISORDER MENTAL COMORBIDITY: ICD-10-CM

## 2019-01-08 DIAGNOSIS — F33.1 MODERATE EPISODE OF RECURRENT MAJOR DEPRESSIVE DISORDER: ICD-10-CM

## 2019-01-08 DIAGNOSIS — F41.0 PANIC DISORDER WITHOUT AGORAPHOBIA: ICD-10-CM

## 2019-01-08 DIAGNOSIS — F41.1 GENERALIZED ANXIETY DISORDER: ICD-10-CM

## 2019-01-08 PROCEDURE — 3008F BODY MASS INDEX DOCD: CPT | Mod: CPTII,S$GLB,, | Performed by: NURSE PRACTITIONER

## 2019-01-08 PROCEDURE — 3075F SYST BP GE 130 - 139MM HG: CPT | Mod: CPTII,S$GLB,, | Performed by: NURSE PRACTITIONER

## 2019-01-08 PROCEDURE — 3078F PR MOST RECENT DIASTOLIC BLOOD PRESSURE < 80 MM HG: ICD-10-PCS | Mod: CPTII,S$GLB,, | Performed by: NURSE PRACTITIONER

## 2019-01-08 PROCEDURE — 3075F PR MOST RECENT SYSTOLIC BLOOD PRESS GE 130-139MM HG: ICD-10-PCS | Mod: CPTII,S$GLB,, | Performed by: NURSE PRACTITIONER

## 2019-01-08 PROCEDURE — 90833 PSYTX W PT W E/M 30 MIN: CPT | Mod: S$GLB,,, | Performed by: NURSE PRACTITIONER

## 2019-01-08 PROCEDURE — 99999 PR PBB SHADOW E&M-EST. PATIENT-LVL III: CPT | Mod: PBBFAC,,, | Performed by: NURSE PRACTITIONER

## 2019-01-08 PROCEDURE — 99999 PR PBB SHADOW E&M-EST. PATIENT-LVL III: ICD-10-PCS | Mod: PBBFAC,,, | Performed by: NURSE PRACTITIONER

## 2019-01-08 PROCEDURE — 3078F DIAST BP <80 MM HG: CPT | Mod: CPTII,S$GLB,, | Performed by: NURSE PRACTITIONER

## 2019-01-08 PROCEDURE — 99213 PR OFFICE/OUTPT VISIT, EST, LEVL III, 20-29 MIN: ICD-10-PCS | Mod: S$GLB,,, | Performed by: NURSE PRACTITIONER

## 2019-01-08 PROCEDURE — 3008F PR BODY MASS INDEX (BMI) DOCUMENTED: ICD-10-PCS | Mod: CPTII,S$GLB,, | Performed by: NURSE PRACTITIONER

## 2019-01-08 PROCEDURE — 99213 OFFICE O/P EST LOW 20 MIN: CPT | Mod: S$GLB,,, | Performed by: NURSE PRACTITIONER

## 2019-01-08 PROCEDURE — 90833 PR PSYCHOTHERAPY W/PATIENT W/E&M, 30 MIN (ADD ON): ICD-10-PCS | Mod: S$GLB,,, | Performed by: NURSE PRACTITIONER

## 2019-01-08 RX ORDER — TRAZODONE HYDROCHLORIDE 100 MG/1
TABLET ORAL
Qty: 90 TABLET | Refills: 1 | Status: SHIPPED | OUTPATIENT
Start: 2019-01-08 | End: 2019-05-31 | Stop reason: SDUPTHER

## 2019-01-08 RX ORDER — BUPROPION HYDROCHLORIDE 300 MG/1
300 TABLET ORAL DAILY
Qty: 90 TABLET | Refills: 1 | Status: SHIPPED | OUTPATIENT
Start: 2019-01-08 | End: 2019-05-31 | Stop reason: SDUPTHER

## 2019-01-08 RX ORDER — CITALOPRAM 40 MG/1
TABLET, FILM COATED ORAL
Qty: 90 TABLET | Refills: 1 | Status: SHIPPED | OUTPATIENT
Start: 2019-01-08 | End: 2019-05-31 | Stop reason: SDUPTHER

## 2019-01-08 NOTE — PROGRESS NOTES
"Outpatient Psychiatry Follow-Up Visit (MD/NP)    1/8/2019    Clinical Status of Patient:  Outpatient (Ambulatory)    Chief Complaint:  Ken Anderson is a 33 y.o. male who presents today for follow-up of depression, mood disorder, anxiety, attention problems and panic.  Met with patient      Last Visit: was on 12/07/18. Chart and  reviewed  .     Interval History and Content of Current Session:  Current Psych Medications  · DC Abilify   · Continue Celexa 40mg po q day  · Continue Ativan 1mg po q day prn  · DC Remeron   · Continue Vyvanse 30 mg po daily ( 3 months Rx printed)  · Start Wellbutrin  mg po daily x 7 days then increase to 300 mg   · Restart Trazodone 100 mg po q hs PRN insomnia    Affect appears brighter. Pt stated, "I'm seeing improvement".  Reports Trazodone is effective. Remeron was oversedating but he wants to use it on the days that he receives a steroid injection.  Pt reports that he enjoyed the holidays.   Denies SI/HI/AVH. Denies any other side effects to medications.     Psychotherapy:  · Target symptoms: depression, distractability, anxiety   · Why chosen therapy is appropriate versus another modality: relevant to diagnosis  · Outcome monitoring methods: self-report  · Therapeutic intervention type: insight oriented psychotherapy  · Topics discussed/themes: symptom recognition  · The patient's response to the intervention is accepting. The patient's progress toward treatment goals is fair.   · Duration of intervention: 17 minutes.    Review of Systems     GENERAL: no weight change  SKIN: No rashes or lacerations   HEAD: No headaches   EYES: No exophthalmos, jaundice or blindness   EARS: No dizziness, tinnitus or hearing loss   NOSE: No changes in smell   MOUTH & THROAT: throat infection, currently on antibiotics.   CHEST: No shortness of breath, hyperventilation or cough   CARDIOVASCULAR: No tachycardia or chest pain   ABDOMEN: No nausea, vomiting, pain, constipation or diarrhea " "  URINARY: No frequency, dysuria or sexual dysfunction   ENDOCRINE: No polydipsia, polyuria   MUSCULOSKELETAL: No pain or stiffness of the joints   NEUROLOGIC: No weakness, sensory changes, seizures, confusion, memory loss, tremor or other abnormal movements      Psychiatric Review Of Systems:  sleep: yes  appetite changes: yes  weight changes: yes  energy/anergy: yes  interest/pleasure/anhedonia: yes  somatic symptoms: no  libido: yes  anxiety/panic: no    Past Medical, Family and Social History: The patient's past medical, family and social history have been reviewed and updated as appropriate within the electronic medical record - see encounter notes.    Compliance: yes    Side effects: see above    Risk Parameters:  Patient reports no suicidal ideation  Patient reports no homicidal ideation  Patient reports no self-injurious behavior  Patient reports no violent behavior    Exam (detailed: at least 9 elements; comprehensive: all 15 elements)   Constitutional  Vitals:  Most recent vital signs, dated greater than 90 days prior to this appointment, were reviewed.   Vitals:    01/08/19 0817   BP: 132/74   Pulse: 71   Weight: (!) 158.6 kg (349 lb 8.6 oz)   Height: 6' 4" (1.93 m)        General:  unremarkable, age appropriate, well dressed, neatly groomed     Musculoskeletal  Muscle Strength/Tone:  no dyskinesia, no dystonia, no tremor, no tic   Gait & Station:  non-ataxic     Psychiatric  Speech:  no latency; no press   Mood & Affect:  dysthymic  blunted   Thought Process:  normal and logical   Associations:  intact   Thought Content:  normal, no suicidality, no homicidality, delusions, or paranoia   Insight:  has awareness of illness   Judgement: behavior is adequate to circumstances   Orientation:  grossly intact   Memory: intact for content of interview   Language: grossly intact   Attention Span & Concentration:  able to focus   Fund of Knowledge:  intact and appropriate to age and level of education "     Assessment and Diagnosis   Status/Progress: Based on the examination today, the patient's problem(s) is/are improved and adequately but not ideally controlled.  New problems have not been presented today.   Co-morbidities are not complicating management of the primary condition.  There are no active rule-out diagnoses for this patient at this time.     General Impression:       ICD-10-CM ICD-9-CM   1. Moderate episode of recurrent major depressive disorder F33.1 296.32   2. Insomnia disorder with non-sleep disorder mental comorbidity G47.00 780.52   3. Generalized anxiety disorder F41.1 300.02   4. Panic disorder without agoraphobia F41.0 300.01       Intervention/Counseling/Treatment Plan   · Medication Management: Continue current medications. The risks and benefits of medication were discussed with the patient.   · Continue Celexa 40mg po q day  · Continue Ativan 1mg po q day prn  · Continue Vyvanse 30 mg po daily ( 3 months Rx printed)  · Continue Wellbutrin  mg po daily   · Continue Trazodone 100 mg po q hs PRN insomnia  · Continue individual psychotherapy with Debora Zamora LPC  · Reviewed Safety Plan and emergency contacts.     Return to Clinic: 6 months

## 2019-01-16 ENCOUNTER — OFFICE VISIT (OUTPATIENT)
Dept: DERMATOLOGY | Facility: CLINIC | Age: 34
End: 2019-01-16
Payer: COMMERCIAL

## 2019-01-16 DIAGNOSIS — L21.9 SEBORRHEIC DERMATITIS: Primary | ICD-10-CM

## 2019-01-16 PROCEDURE — 99999 PR PBB SHADOW E&M-EST. PATIENT-LVL III: CPT | Mod: PBBFAC,,, | Performed by: DERMATOLOGY

## 2019-01-16 PROCEDURE — 99999 PR PBB SHADOW E&M-EST. PATIENT-LVL III: ICD-10-PCS | Mod: PBBFAC,,, | Performed by: DERMATOLOGY

## 2019-01-16 PROCEDURE — 99214 OFFICE O/P EST MOD 30 MIN: CPT | Mod: S$GLB,,, | Performed by: DERMATOLOGY

## 2019-01-16 PROCEDURE — 99214 PR OFFICE/OUTPT VISIT, EST, LEVL IV, 30-39 MIN: ICD-10-PCS | Mod: S$GLB,,, | Performed by: DERMATOLOGY

## 2019-01-16 RX ORDER — KETOCONAZOLE 20 MG/ML
SHAMPOO, SUSPENSION TOPICAL
Qty: 120 ML | Refills: 11 | Status: SHIPPED | OUTPATIENT
Start: 2019-01-16 | End: 2020-04-03 | Stop reason: SDUPTHER

## 2019-01-16 RX ORDER — KETOCONAZOLE 20 MG/G
CREAM TOPICAL
Qty: 60 G | Refills: 3 | Status: SHIPPED | OUTPATIENT
Start: 2019-01-16 | End: 2020-04-03 | Stop reason: SDUPTHER

## 2019-01-16 NOTE — PROGRESS NOTES
Subjective:       Patient ID:  Ken Anderson is a 33 y.o. male who presents for   Chief Complaint   Patient presents with    Dry Skin     Last seen by Dr. Palomares 12/8/2017.  Present today for initial visit with c/o rash to face x 1 month. Only itches when dried out. Tx with hydrocortisone about 1 week ago with relief, but it has started to reappear.    No phx or fhx of skin cancer.     Past Medical History:  No date: ADHD (attention deficit hyperactivity disorder)  6/29/2012: Adjustment disorder with mixed emotional features  1/7/2013: Anxiety state, unspecified  No date: Aplastic anemia      Comment:  aplastic anemia  No date: Blood transfusion  No date: Depression  10/12/2015: Essential hypertension  6/29/2012: Generalized anxiety disorder  No date: GERD (gastroesophageal reflux disease)  13YRS: History of eye injury      Comment:  finger stuck ?eye  6/29/2012: Major depressive disorder, recurrent episode, mild  6/29/2012: Panic disorder without agoraphobia  5/10/2017: Tobacco abuse disorder            Review of Systems   Constitutional: Negative for fever and chills.   HENT: Negative for sore throat.    Gastrointestinal: Negative for nausea and vomiting.   Skin: Positive for rash and dry skin. Negative for itching.        Objective:    Physical Exam   Constitutional: He appears well-developed and well-nourished. No distress.   Eyes: Lids are normal.  No conjunctival no injection.   Neurological: He is alert and oriented to person, place, and time. He is not disoriented.   Psychiatric: He has a normal mood and affect.   Skin:   Areas Examined (abnormalities noted in diagram):   Head / Face Inspection Performed  Neck Inspection Performed  Chest / Axilla Inspection Performed  Back Inspection Performed  RUE Inspected  LUE Inspection Performed              Diagram Legend     Erythematous scaling macule/papule c/w actinic keratosis       Vascular papule c/w angioma      Pigmented verrucoid papule/plaque c/w  seborrheic keratosis      Yellow umbilicated papule c/w sebaceous hyperplasia      Irregularly shaped tan macule c/w lentigo     1-2 mm smooth white papules consistent with Milia      Movable subcutaneous cyst with punctum c/w epidermal inclusion cyst      Subcutaneous movable cyst c/w pilar cyst      Firm pink to brown papule c/w dermatofibroma      Pedunculated fleshy papule(s) c/w skin tag(s)      Evenly pigmented macule c/w junctional nevus     Mildly variegated pigmented, slightly irregular-bordered macule c/w mildly atypical nevus      Flesh colored to evenly pigmented papule c/w intradermal nevus       Pink pearly papule/plaque c/w basal cell carcinoma      Erythematous hyperkeratotic cursted plaque c/w SCC      Surgical scar with no sign of skin cancer recurrence      Open and closed comedones      Inflammatory papules and pustules      Verrucoid papule consistent consistent with wart     Erythematous eczematous patches and plaques     Dystrophic onycholytic nail with subungual debris c/w onychomycosis     Umbilicated papule    Erythematous-base heme-crusted tan verrucoid plaque consistent with inflamed seborrheic keratosis     Erythematous Silvery Scaling Plaque c/w Psoriasis     See annotation      Assessment / Plan:        Seborrheic dermatitis  -     ketoconazole (NIZORAL) 2 % shampoo; Wash hair and beard 3x/week  Dispense: 120 mL; Refill: 11  -     ketoconazole (NIZORAL) 2 % cream; AAA bid  Dispense: 60 g; Refill: 3    ok to use hc cream bid only prn flare, avoid chronic use  Consider oral diflucan in future          Follow-up if symptoms worsen or fail to improve.

## 2019-02-05 DIAGNOSIS — I10 ESSENTIAL HYPERTENSION: ICD-10-CM

## 2019-02-05 RX ORDER — AMLODIPINE BESYLATE 10 MG/1
TABLET ORAL
Qty: 90 TABLET | Refills: 0 | Status: SHIPPED | OUTPATIENT
Start: 2019-02-05 | End: 2019-04-29 | Stop reason: SDUPTHER

## 2019-02-11 DIAGNOSIS — K13.0 ANGULAR CHEILITIS: ICD-10-CM

## 2019-02-13 RX ORDER — NYSTATIN AND TRIAMCINOLONE ACETONIDE 100000; 1 [USP'U]/G; MG/G
OINTMENT TOPICAL
Qty: 30 G | Refills: 1 | Status: SHIPPED | OUTPATIENT
Start: 2019-02-13 | End: 2020-06-24 | Stop reason: SDUPTHER

## 2019-03-06 ENCOUNTER — TELEPHONE (OUTPATIENT)
Dept: OPTOMETRY | Facility: CLINIC | Age: 34
End: 2019-03-06

## 2019-03-06 NOTE — TELEPHONE ENCOUNTER
----- Message from Brandi Braden sent at 3/6/2019  1:41 PM CST -----  Contact: Ken Anderson   Pt would like to speak with  nurse about the eye glass prescription,pt can be reached at [phone] cell# please thank you.

## 2019-03-22 ENCOUNTER — PATIENT MESSAGE (OUTPATIENT)
Dept: PULMONOLOGY | Facility: CLINIC | Age: 34
End: 2019-03-22

## 2019-03-22 DIAGNOSIS — G47.33 OSA (OBSTRUCTIVE SLEEP APNEA): Primary | ICD-10-CM

## 2019-03-22 NOTE — TELEPHONE ENCOUNTER
Michael Torres:    I look forward to seeing you next month.  In the interim, is it possible to get a perscription sent over to the home medical equipment people at [facility].  I need a new mask as my current one is so used that it is making noise that disturbs my partner's sleep.  If you have any questions, please do not hesitate to contact me at [phone].  isiah gonzalez

## 2019-03-28 ENCOUNTER — PATIENT MESSAGE (OUTPATIENT)
Dept: PSYCHIATRY | Facility: CLINIC | Age: 34
End: 2019-03-28

## 2019-03-29 DIAGNOSIS — F90.1 ATTENTION DEFICIT HYPERACTIVITY DISORDER (ADHD), PREDOMINANTLY HYPERACTIVE TYPE: ICD-10-CM

## 2019-03-29 RX ORDER — LISDEXAMFETAMINE DIMESYLATE 30 MG/1
30 CAPSULE ORAL EVERY MORNING
Qty: 30 CAPSULE | Refills: 0 | Status: SHIPPED | OUTPATIENT
Start: 2019-03-29 | End: 2019-05-01 | Stop reason: SDUPTHER

## 2019-04-05 ENCOUNTER — TELEPHONE (OUTPATIENT)
Dept: INFECTIOUS DISEASES | Facility: CLINIC | Age: 34
End: 2019-04-05

## 2019-04-17 ENCOUNTER — OFFICE VISIT (OUTPATIENT)
Dept: PULMONOLOGY | Facility: CLINIC | Age: 34
End: 2019-04-17
Payer: COMMERCIAL

## 2019-04-17 VITALS
HEIGHT: 76 IN | SYSTOLIC BLOOD PRESSURE: 61 MMHG | DIASTOLIC BLOOD PRESSURE: 42 MMHG | BODY MASS INDEX: 38.36 KG/M2 | WEIGHT: 315 LBS | HEART RATE: 83 BPM | OXYGEN SATURATION: 95 % | TEMPERATURE: 98 F

## 2019-04-17 DIAGNOSIS — G47.33 OSA (OBSTRUCTIVE SLEEP APNEA): Primary | ICD-10-CM

## 2019-04-17 DIAGNOSIS — G47.09 OTHER INSOMNIA: ICD-10-CM

## 2019-04-17 PROBLEM — G47.00 INSOMNIA: Status: ACTIVE | Noted: 2019-04-17

## 2019-04-17 PROCEDURE — 3008F PR BODY MASS INDEX (BMI) DOCUMENTED: ICD-10-PCS | Mod: CPTII,S$GLB,, | Performed by: INTERNAL MEDICINE

## 2019-04-17 PROCEDURE — 99213 OFFICE O/P EST LOW 20 MIN: CPT | Mod: S$GLB,,, | Performed by: INTERNAL MEDICINE

## 2019-04-17 PROCEDURE — 3078F DIAST BP <80 MM HG: CPT | Mod: CPTII,S$GLB,, | Performed by: INTERNAL MEDICINE

## 2019-04-17 PROCEDURE — 3078F PR MOST RECENT DIASTOLIC BLOOD PRESSURE < 80 MM HG: ICD-10-PCS | Mod: CPTII,S$GLB,, | Performed by: INTERNAL MEDICINE

## 2019-04-17 PROCEDURE — 3074F SYST BP LT 130 MM HG: CPT | Mod: CPTII,S$GLB,, | Performed by: INTERNAL MEDICINE

## 2019-04-17 PROCEDURE — 99999 PR PBB SHADOW E&M-EST. PATIENT-LVL II: ICD-10-PCS | Mod: PBBFAC,,, | Performed by: INTERNAL MEDICINE

## 2019-04-17 PROCEDURE — 99213 PR OFFICE/OUTPT VISIT, EST, LEVL III, 20-29 MIN: ICD-10-PCS | Mod: S$GLB,,, | Performed by: INTERNAL MEDICINE

## 2019-04-17 PROCEDURE — 3074F PR MOST RECENT SYSTOLIC BLOOD PRESSURE < 130 MM HG: ICD-10-PCS | Mod: CPTII,S$GLB,, | Performed by: INTERNAL MEDICINE

## 2019-04-17 PROCEDURE — 99999 PR PBB SHADOW E&M-EST. PATIENT-LVL II: CPT | Mod: PBBFAC,,, | Performed by: INTERNAL MEDICINE

## 2019-04-17 PROCEDURE — 3008F BODY MASS INDEX DOCD: CPT | Mod: CPTII,S$GLB,, | Performed by: INTERNAL MEDICINE

## 2019-04-17 RX ORDER — APIXABAN 5 MG/1
TABLET, FILM COATED ORAL
Refills: 3 | COMMUNITY
Start: 2019-04-07 | End: 2021-02-20 | Stop reason: DRUGHIGH

## 2019-04-17 RX ORDER — FOLIC ACID 1 MG/1
TABLET ORAL
Refills: 3 | COMMUNITY
Start: 2019-04-06 | End: 2019-12-04

## 2019-04-17 NOTE — PROGRESS NOTES
Ken Anderson  was seen as a follow up.    CHIEF COMPLAINT:    No chief complaint on file.      HISTORY OF PRESENT ILLNESS: Ken Anderson is a 33 y.o. male is here for sleep evaluation.   Our first enconter was 9/23/14. Patient was told by father and sister (both are sleep boarded) to have sleep apnea.  Patient with loud snoring.  +fatigue upon awakening.  +weight gain 60 # since 2013.  +sleepiness a/w driving on rare occasion.  No parasomnia.  No cataplexy.  No rls symptoms.      Badger Sleepiness Scale score during initial sleep evaluation was 7.  Patient was set up for apap 11/14.   +using apap most night.  Sleep better with apap.  Rested upon awake.  No snoring with apap.  No difficulty with sleep initiation.  No dry mouth.  Patient admits to lots of stress with new position  of [employer] in [place].  +stress eating.      Since our last encounter, patient was diagnosed with dvt 9/18.  Currently on eloquis.      SLEEP ROUTINE:  Activity the hour prior to sleep: read     Bed partner:  alone  Time to bed:  9:45 pm   Lights off:  yes  Sleep onset latency:  10 minutes        Disruptions or awakenings:    none    Wakeup time:      5:30 am  Perceived sleep quality:  Rested      Daytime naps:      none  Weekend sleep routine:      12 am till 8 am   Caffeine use: 2 cups of coffee in am and 1 diet coke throughout the day; last coke around 3 pm  exercise habit:   [employer] 6 am daily    PAST MEDICAL HISTORY:    Active Ambulatory Problems     Diagnosis Date Noted    ADHD (attention deficit hyperactivity disorder) 06/29/2012    Severe episode of recurrent major depressive disorder, without psychotic features 06/29/2012    Generalized anxiety disorder 06/29/2012    Panic disorder without agoraphobia 06/29/2012    Adjustment disorder with mixed emotional features 06/29/2012    Aplastic anemia 07/23/2012    Smoking addiction 08/21/2012    Essential hypertension 10/12/2015    Knee pain, bilateral  02/22/2016    Hypertension not at goal 05/10/2017    BMI 38.0-38.9,adult 05/10/2017    Tobacco abuse disorder 05/10/2017    Major depressive disorder with single episode, in full remission 03/09/2018    JUSTO (obstructive sleep apnea) 04/17/2019    Insomnia 04/17/2019     Resolved Ambulatory Problems     Diagnosis Date Noted    Depressive disorder, not elsewhere classified 07/30/2012    Adjustment disorder with mixed anxiety and depressed mood 07/30/2012    ADD (attention deficit disorder) 10/01/2012    Major depressive disorder, recurrent episode, moderate 11/12/2012    Anxiety state, unspecified 01/07/2013    Infiltrate of cornea - Right Eye 03/01/2013    Cornea ulcer - Right Eye 03/04/2013     Past Medical History:   Diagnosis Date    ADHD (attention deficit hyperactivity disorder)     Adjustment disorder with mixed emotional features 6/29/2012    Anxiety state, unspecified 1/7/2013    Aplastic anemia     Blood transfusion     Depression     Essential hypertension 10/12/2015    Generalized anxiety disorder 6/29/2012    GERD (gastroesophageal reflux disease)     History of eye injury 13YRS    Major depressive disorder, recurrent episode, mild 6/29/2012    Panic disorder without agoraphobia 6/29/2012    Tobacco abuse disorder 5/10/2017                PAST SURGICAL HISTORY:    Past Surgical History:   Procedure Laterality Date    BONE MARROW BIOPSY      COLONOSCOPY      EGD (ESOPHAGOGASTRODUODENOSCOPY) N/A 1/3/2013    Performed by Jono Oneal MD at [facility] ([address] [place] [address])    ESOPHAGOGASTRODUODENOSCOPY           FAMILY HISTORY:                Family History   Problem Relation Age of Onset    Cancer Father         skin    Skin cancer Father     Emphysema Maternal Grandmother     Prostate cancer Maternal Grandfather     Dementia Paternal Grandmother     Cancer Paternal Grandfather         liver    No Known Problems Mother     No Known Problems Sister     No Known Problems Sister     No  Known Problems Brother     No Known Problems Maternal Aunt     No Known Problems Maternal Uncle     No Known Problems Paternal Aunt     No Known Problems Paternal Uncle     Melanoma Neg Hx     Psoriasis Neg Hx     Lupus Neg Hx     Eczema Neg Hx     Amblyopia Neg Hx     Blindness Neg Hx     Cataracts Neg Hx     Diabetes Neg Hx     Glaucoma Neg Hx     Hypertension Neg Hx     Macular degeneration Neg Hx     Retinal detachment Neg Hx     Strabismus Neg Hx     Stroke Neg Hx     Thyroid disease Neg Hx        SOCIAL HISTORY:          Tobacco:   Social History     Tobacco Use   Smoking Status Former Smoker    Packs/day: 0.30    Years: 7.00    Pack years: 2.10    Types: Cigarettes    Last attempt to quit: 2018    Years since quittin.6   Smokeless Tobacco Current User       alcohol use:    Social History     Substance and Sexual Activity   Alcohol Use Yes    Alcohol/week: 2.4 oz    Types: 4 Standard drinks or equivalent per week    Comment: 4 week                 Occupation:   for [employer]    ALLERGIES:    Review of patient's allergies indicates:   Allergen Reactions    No known drug allergies        CURRENT MEDICATIONS:    Current Outpatient Medications   Medication Sig Dispense Refill    amLODIPine (NORVASC) 10 MG tablet TAKE 1 TABLET BY MOUTH EVERY DAY 90 tablet 0    buPROPion (WELLBUTRIN XL) 300 MG 24 hr tablet Take 1 tablet (300 mg total) by mouth once daily. 90 tablet 1    citalopram (CELEXA) 40 MG tablet TAKE 1 TABLET(40 MG) BY MOUTH EVERY DAY 90 tablet 1    fexofenadine (ALLEGRA) 30 MG tablet Take 30 mg by mouth as needed.       FLUARIX QUAD 8920-3238, PF, 60 mcg (15 mcg x 4)/0.5 mL vaccine       ketoconazole (NIZORAL) 2 % cream AAA bid 60 g 3    ketoconazole (NIZORAL) 2 % shampoo Wash hair and beard 3x/week 120 mL 11    lisdexamfetamine (VYVANSE) 30 MG capsule Take 1 capsule (30 mg total) by mouth every morning. 30 capsule 0    LORazepam (ATIVAN)  "1 MG tablet TAKE 1 TABLET(1 MG) BY MOUTH DAILY AS NEEDED FOR ANXIETY 30 tablet 5    nystatin-triamcinolone (MYCOLOG) ointment AAA at corner of mouth BID 30 g 1    tobramycin sulfate 0.3% (TOBREX) 0.3 % ophthalmic solution 1-2 drops topically twice daily to affected toe(s). 5 mL 3    traZODone (DESYREL) 100 MG tablet TAKE 1/2 TO 1 TABLET BY MOUTH AT BEDTIME FOR INSOMNIA 90 tablet 1    tretinoin (RETIN-A) 0.025 % cream Apply small amount to entire face qhs. Wash off qam and apply sunscreen. 45 g 3    valACYclovir (VALTREX) 1000 MG tablet TAKE 2 TABLETS BY MOUTH TWICE DAILY FOR 1 DAY; BEGIN AT FIRST SIGN OF OUTBREAK 12 tablet 0    CALCIUM CITRATE/VITAMIN D3 (CITRACAL REGULAR ORAL) Take by mouth.      ELIQUIS 5 mg Tab TK 1 T PO BID AS DIRECTED  3    enoxaparin (LOVENOX) 100 mg/mL Syrg INJECT ONE AND SS SYRINGES BID FOR 3 DAYS  0    enoxaparin (LOVENOX) 150 mg/mL Syrg INJECT UTD BID  0    fluticasone (FLONASE) 50 mcg/actuation nasal spray 2 sprays by Each Nare route once daily. 16 g 3    folic acid (FOLVITE) 1 MG tablet TK 1 T PO QD  3    varenicline (CHANTIX) 1 mg Tab Take 1 tablet (1 mg total) by mouth 2 (two) times daily. 60 tablet 2    VITAMIN B COMPLEX ORAL Take by mouth.       No current facility-administered medications for this visit.                   REVIEW OF SYSTEMS:   No acute changes from previous encounter dated 9/23/2014 with exceptions mentioned in HPI.            PHYSICAL EXAM:  Vitals:    04/17/19 1345   BP: (!) 61/42   Pulse: 83   Temp: 98.3 °F (36.8 °C)   SpO2: 95%   Weight: (!) 159.2 kg (350 lb 14.4 oz)   Height: 6' 4" (1.93 m)   PainSc: 0-No pain     Body mass index is 42.71 kg/m².     GENERAL: Normal development, well groomed  HEENT:  Conjunctivae are non-erythematous; Pupils equal, round, and reactive to light; Nose is symmetrical; Nasal mucosa is pink and moist; Septum is midline; Inferior turbinates are normal; Nasal airflow is congested; Posterior pharynx is pink; Modified " Mallampati: 4; Posterior palate is normal; Tonsils +2; Uvula is normal and pink;Tongue is normal; Dentition is fair; No TMJ tenderness; Jaw opening and protrusion without click and without discomfort.  NECK: Supple. Neck circumference is 16.75 inches. No thyromegaly. No palpable nodes.     SKIN: On face and neck: No abrasions, no rashes, no lesions.  No subcutaneous nodules are palpable.  RESPIRATORY: Chest is clear to auscultation.  Normal chest expansion and non-labored breathing at rest.  CARDIOVASCULAR: Normal S1, S2.  No murmurs, gallops or rubs. No carotid bruits bilaterally.  EXTREMITIES: No edema. No clubbing. No cyanosis. Station normal. Gait normal.        NEURO/PSYCH: Oriented to time, place and person. Normal attention span and concentration. Affect is full. Mood is normal.                                              DATA hst 11/10/14 ahi of 9    No results found for: TSH  ASSESSMENT  Problem List Items Addressed This Visit     Insomnia    Overview     sleep initiation improve.  Prn ativan.         JUSTO (obstructive sleep apnea) - Primary    Overview     Doing well with apap and nasal mask.  100%>4 hours.  Patient is benefiting from apap.  Have not need chin strap.          Relevant Orders    CPAP/BIPAP SUPPLIES           Nasal congestion - currently on flonase, sinus irrigation and allegra.      Tobacco abuse - off chantix.  Still smoke 1 ppd.      htn - repeat bp 140/90.    Obesity - gained 20 lbs since 2017.  Seeing dietician at [facility].  Per patient, patient is loosing weight.    Education: During our discussion today, we talked about the etiology of obstructive sleep apnea as well as the potential ramifications of untreated sleep apnea, which could include daytime sleepiness, hypertension, heart disease and/or stroke.      Precautions: The patient was advised to abstain from driving should they feel sleepy or drowsy.     Patient will Follow up in about 1 year (around 4/17/2020).

## 2019-04-29 DIAGNOSIS — I10 ESSENTIAL HYPERTENSION: ICD-10-CM

## 2019-04-29 RX ORDER — AMLODIPINE BESYLATE 10 MG/1
TABLET ORAL
Qty: 90 TABLET | Refills: 0 | Status: SHIPPED | OUTPATIENT
Start: 2019-04-29 | End: 2019-06-17 | Stop reason: SDUPTHER

## 2019-04-30 ENCOUNTER — CLINICAL SUPPORT (OUTPATIENT)
Dept: INFECTIOUS DISEASES | Facility: CLINIC | Age: 34
End: 2019-04-30

## 2019-04-30 ENCOUNTER — OFFICE VISIT (OUTPATIENT)
Dept: INFECTIOUS DISEASES | Facility: CLINIC | Age: 34
End: 2019-04-30

## 2019-04-30 VITALS
SYSTOLIC BLOOD PRESSURE: 156 MMHG | HEART RATE: 89 BPM | WEIGHT: 315 LBS | HEIGHT: 76 IN | BODY MASS INDEX: 38.36 KG/M2 | TEMPERATURE: 98 F | DIASTOLIC BLOOD PRESSURE: 98 MMHG

## 2019-04-30 DIAGNOSIS — Z23 IMMUNIZATION DUE: ICD-10-CM

## 2019-04-30 DIAGNOSIS — Z23 IMMUNIZATION DUE: Primary | ICD-10-CM

## 2019-04-30 PROCEDURE — 90691 TYPHOID VACCINE IM: CPT | Mod: S$GLB,,, | Performed by: INTERNAL MEDICINE

## 2019-04-30 PROCEDURE — 99401 PR PREVENT COUNSEL,INDIV,15 MIN: ICD-10-PCS | Mod: S$GLB,,, | Performed by: INTERNAL MEDICINE

## 2019-04-30 PROCEDURE — 90715 TDAP VACCINE GREATER THAN OR EQUAL TO 7YO IM: ICD-10-PCS | Mod: S$GLB,,, | Performed by: INTERNAL MEDICINE

## 2019-04-30 PROCEDURE — 90471 IMMUNIZATION ADMIN: CPT | Mod: S$GLB,,, | Performed by: INTERNAL MEDICINE

## 2019-04-30 PROCEDURE — 90715 TDAP VACCINE 7 YRS/> IM: CPT | Mod: S$GLB,,, | Performed by: INTERNAL MEDICINE

## 2019-04-30 PROCEDURE — 90471 TYPHOID VICPS VACCINE IM: ICD-10-PCS | Mod: S$GLB,,, | Performed by: INTERNAL MEDICINE

## 2019-04-30 PROCEDURE — 99401 PREV MED CNSL INDIV APPRX 15: CPT | Mod: S$GLB,,, | Performed by: INTERNAL MEDICINE

## 2019-04-30 PROCEDURE — 99999 PR PBB SHADOW E&M-EST. PATIENT-LVL III: ICD-10-PCS | Mod: PBBFAC,,, | Performed by: INTERNAL MEDICINE

## 2019-04-30 PROCEDURE — 90691 TYPHOID VICPS VACCINE IM: ICD-10-PCS | Mod: S$GLB,,, | Performed by: INTERNAL MEDICINE

## 2019-04-30 PROCEDURE — 99999 PR PBB SHADOW E&M-EST. PATIENT-LVL III: CPT | Mod: PBBFAC,,, | Performed by: INTERNAL MEDICINE

## 2019-04-30 PROCEDURE — 90472 IMMUNIZATION ADMIN EACH ADD: CPT | Mod: S$GLB,,, | Performed by: INTERNAL MEDICINE

## 2019-04-30 PROCEDURE — 90472 TDAP VACCINE GREATER THAN OR EQUAL TO 7YO IM: ICD-10-PCS | Mod: S$GLB,,, | Performed by: INTERNAL MEDICINE

## 2019-04-30 NOTE — PROGRESS NOTES
Subjective:      Chief Complaint:   Chief Complaint   Patient presents with    Travel Consult     History of Present Illness    Patient  33 y.o. male who presents today for routine pretravel consultation.  The patient reports a past medical history of aplastic anemia and PNH.  He takes ecluzimab for his hemotologic disorder.  The patient reports the following medication allergies; none.  The patient reports the following food allergies; none .  The patient will be traveling to [facility] on June 29.  The patient will be at this destination for 14 days.  The patient will be lodging at a hotels in [facility].  The patient has travelled to the following other countries in the past; [place].  The patient reports that they received all their childhood vaccinations.  The patient reports receipt of the following travel related vaccinations; hepatitis A series, hepatitis B series, meningococcal vaccine.  The purpose of this trip is for vacation.    Review of Systems   All other systems reviewed and are negative.    Objective:   Physical Exam   Assessment:     Pre-Travel clinic assessment    Plan:   Patient specific risks:      Patient has a history of immune compromised state and hence is at higher risk for infections.    Destination specific risks:      -Infectious Disease risks:       Mosquito Borne pathogens:  Reviewed basic mosquito avoidance precautions including wearing long sleeve clothing and insect repellant.  Risk of zika virus and dengue virus was discussed with the patient.     Food Borne pathogens:  Reviewed basic hand, food and water sanitation precautions.  Patient instructed to take hand  on their trip.  Will typhoid IM.  Azithromycin as needed for severe diarrhea.     Blood Borne Pathogens:  He has had hepatitis b vaccination series.       Routine:  Will give Tdap vaccine today.    -Environmental risks:     Precautions to minimize risk/exposure to crime and motor  vehicle accidents were reviewed with the patient.

## 2019-05-01 ENCOUNTER — PATIENT MESSAGE (OUTPATIENT)
Dept: PSYCHIATRY | Facility: CLINIC | Age: 34
End: 2019-05-01

## 2019-05-01 DIAGNOSIS — F90.1 ATTENTION DEFICIT HYPERACTIVITY DISORDER (ADHD), PREDOMINANTLY HYPERACTIVE TYPE: ICD-10-CM

## 2019-05-01 RX ORDER — LISDEXAMFETAMINE DIMESYLATE 30 MG/1
30 CAPSULE ORAL EVERY MORNING
Qty: 30 CAPSULE | Refills: 0 | Status: SHIPPED | OUTPATIENT
Start: 2019-05-01 | End: 2019-05-31 | Stop reason: SDUPTHER

## 2019-05-20 ENCOUNTER — TELEPHONE (OUTPATIENT)
Dept: OPHTHALMOLOGY | Facility: CLINIC | Age: 34
End: 2019-05-20

## 2019-05-30 ENCOUNTER — PATIENT MESSAGE (OUTPATIENT)
Dept: INFECTIOUS DISEASES | Facility: CLINIC | Age: 34
End: 2019-05-30

## 2019-05-30 ENCOUNTER — TELEPHONE (OUTPATIENT)
Dept: INFECTIOUS DISEASES | Facility: CLINIC | Age: 34
End: 2019-05-30

## 2019-05-30 DIAGNOSIS — Z71.84 TRAVEL ADVICE ENCOUNTER: Primary | ICD-10-CM

## 2019-05-30 RX ORDER — AZITHROMYCIN 500 MG/1
500 TABLET, FILM COATED ORAL DAILY
Qty: 3 TABLET | Refills: 0 | Status: SHIPPED | OUTPATIENT
Start: 2019-05-30 | End: 2019-06-02

## 2019-05-31 ENCOUNTER — OFFICE VISIT (OUTPATIENT)
Dept: PSYCHIATRY | Facility: CLINIC | Age: 34
End: 2019-05-31
Payer: COMMERCIAL

## 2019-05-31 DIAGNOSIS — G47.00 INSOMNIA DISORDER WITH NON-SLEEP DISORDER MENTAL COMORBIDITY: ICD-10-CM

## 2019-05-31 DIAGNOSIS — F33.1 MODERATE EPISODE OF RECURRENT MAJOR DEPRESSIVE DISORDER: ICD-10-CM

## 2019-05-31 DIAGNOSIS — F41.0 PANIC DISORDER WITHOUT AGORAPHOBIA: ICD-10-CM

## 2019-05-31 DIAGNOSIS — F41.1 GENERALIZED ANXIETY DISORDER: ICD-10-CM

## 2019-05-31 DIAGNOSIS — F90.1 ATTENTION DEFICIT HYPERACTIVITY DISORDER (ADHD), PREDOMINANTLY HYPERACTIVE TYPE: ICD-10-CM

## 2019-05-31 PROCEDURE — 99999 PR PBB SHADOW E&M-EST. PATIENT-LVL II: CPT | Mod: PBBFAC,,, | Performed by: NURSE PRACTITIONER

## 2019-05-31 PROCEDURE — 99213 OFFICE O/P EST LOW 20 MIN: CPT | Mod: S$GLB,,, | Performed by: NURSE PRACTITIONER

## 2019-05-31 PROCEDURE — 99999 PR PBB SHADOW E&M-EST. PATIENT-LVL II: ICD-10-PCS | Mod: PBBFAC,,, | Performed by: NURSE PRACTITIONER

## 2019-05-31 PROCEDURE — 99213 PR OFFICE/OUTPT VISIT, EST, LEVL III, 20-29 MIN: ICD-10-PCS | Mod: S$GLB,,, | Performed by: NURSE PRACTITIONER

## 2019-05-31 RX ORDER — LISDEXAMFETAMINE DIMESYLATE 30 MG/1
30 CAPSULE ORAL EVERY MORNING
Qty: 30 CAPSULE | Refills: 0 | Status: SHIPPED | OUTPATIENT
Start: 2019-05-31 | End: 2019-05-31 | Stop reason: SDUPTHER

## 2019-05-31 RX ORDER — LISDEXAMFETAMINE DIMESYLATE 30 MG/1
30 CAPSULE ORAL EVERY MORNING
Qty: 30 CAPSULE | Refills: 0 | Status: SHIPPED | OUTPATIENT
Start: 2019-07-28 | End: 2019-08-29 | Stop reason: SDUPTHER

## 2019-05-31 RX ORDER — TRAZODONE HYDROCHLORIDE 100 MG/1
TABLET ORAL
Qty: 90 TABLET | Refills: 1 | Status: SHIPPED | OUTPATIENT
Start: 2019-05-31 | End: 2020-03-06 | Stop reason: SDUPTHER

## 2019-05-31 RX ORDER — LORAZEPAM 1 MG/1
TABLET ORAL
Qty: 30 TABLET | Refills: 5 | Status: SHIPPED | OUTPATIENT
Start: 2019-05-31 | End: 2020-03-06 | Stop reason: SDUPTHER

## 2019-05-31 RX ORDER — BUPROPION HYDROCHLORIDE 300 MG/1
300 TABLET ORAL DAILY
Qty: 90 TABLET | Refills: 1 | Status: SHIPPED | OUTPATIENT
Start: 2019-05-31 | End: 2020-02-21

## 2019-05-31 RX ORDER — CITALOPRAM 40 MG/1
TABLET, FILM COATED ORAL
Qty: 90 TABLET | Refills: 1 | Status: SHIPPED | OUTPATIENT
Start: 2019-05-31 | End: 2020-03-06 | Stop reason: SDUPTHER

## 2019-05-31 RX ORDER — LISDEXAMFETAMINE DIMESYLATE 30 MG/1
30 CAPSULE ORAL EVERY MORNING
Qty: 30 CAPSULE | Refills: 0 | Status: SHIPPED | OUTPATIENT
Start: 2019-06-28 | End: 2019-05-31 | Stop reason: SDUPTHER

## 2019-05-31 NOTE — PROGRESS NOTES
"Outpatient Psychiatry Follow-Up Visit (MD/NP)    5/31/2019    Clinical Status of Patient:  Outpatient (Ambulatory)    Chief Complaint:  Ken Anderson is a 33 y.o. male who presents today for follow-up of depression, mood disorder, anxiety, attention problems and panic.  Met with patient      Last Visit: was on 1/08/19. Chart and  reviewed  .     Interval History and Content of Current Session:  Current Psych Medications  · Continue Celexa 40mg po q day  · Continue Ativan 1mg po q day prn  · Continue Vyvanse 30 mg po daily ( 3 months Rx printed)  · Continue Wellbutrin  mg po daily   · Continue Trazodone 100 mg po q hs PRN insomnia    Affect appears brighter. Pt stated, "I'm doing well".  Planning vacation to [place].  Denies any situational stressors. Denies SI/HI/AVH. Denies any other side effects to medications.     Psychotherapy:  · Target symptoms: depression, distractability, anxiety   · Why chosen therapy is appropriate versus another modality: relevant to diagnosis  · Outcome monitoring methods: self-report  · Therapeutic intervention type: insight oriented psychotherapy  · Topics discussed/themes: symptom recognition  · The patient's response to the intervention is accepting. The patient's progress toward treatment goals is fair.   · Duration of intervention: 10 minutes.    Review of Systems     GENERAL: no weight change  SKIN: No rashes or lacerations   HEAD: No headaches   EYES: No exophthalmos, jaundice or blindness   EARS: No dizziness, tinnitus or hearing loss   NOSE: No changes in smell   MOUTH & THROAT: throat infection, currently on antibiotics.   CHEST: No shortness of breath, hyperventilation or cough   CARDIOVASCULAR: No tachycardia or chest pain   ABDOMEN: No nausea, vomiting, pain, constipation or diarrhea   URINARY: No frequency, dysuria or sexual dysfunction   ENDOCRINE: No polydipsia, polyuria   MUSCULOSKELETAL: No pain or stiffness of the joints   NEUROLOGIC: No weakness, " sensory changes, seizures, confusion, memory loss, tremor or other abnormal movements      Psychiatric Review Of Systems:  sleep: yes  appetite changes: yes  weight changes: yes  energy/anergy: yes  interest/pleasure/anhedonia: yes  somatic symptoms: no  libido: yes  anxiety/panic: no    Past Medical, Family and Social History: The patient's past medical, family and social history have been reviewed and updated as appropriate within the electronic medical record - see encounter notes.    Compliance: yes    Side effects: see above    Risk Parameters:  Patient reports no suicidal ideation  Patient reports no homicidal ideation  Patient reports no self-injurious behavior  Patient reports no violent behavior    Exam (detailed: at least 9 elements; comprehensive: all 15 elements)   Constitutional  Vitals:  Most recent vital signs, dated greater than 90 days prior to this appointment, were not reviewed.   There were no vitals filed for this visit.     General:  unremarkable, age appropriate, well dressed, neatly groomed     Musculoskeletal  Muscle Strength/Tone:  no dyskinesia, no dystonia, no tremor, no tic   Gait & Station:  non-ataxic     Psychiatric  Speech:  no latency; no press   Mood & Affect:  dysthymic  blunted   Thought Process:  normal and logical   Associations:  intact   Thought Content:  normal, no suicidality, no homicidality, delusions, or paranoia   Insight:  has awareness of illness   Judgement: behavior is adequate to circumstances   Orientation:  grossly intact   Memory: intact for content of interview   Language: grossly intact   Attention Span & Concentration:  able to focus   Fund of Knowledge:  intact and appropriate to age and level of education     Assessment and Diagnosis   Status/Progress: Based on the examination today, the patient's problem(s) is/are improved and adequately but not ideally controlled.  New problems have not been presented today.   Co-morbidities are not complicating  management of the primary condition.  There are no active rule-out diagnoses for this patient at this time.     General Impression:       ICD-10-CM ICD-9-CM   1. Attention deficit hyperactivity disorder (ADHD), predominantly hyperactive type F90.1 314.01   2. Generalized anxiety disorder F41.1 300.02   3. Panic disorder without agoraphobia F41.0 300.01   4. Moderate episode of recurrent major depressive disorder F33.1 296.32   5. Insomnia disorder with non-sleep disorder mental comorbidity G47.00 780.52       Intervention/Counseling/Treatment Plan   · Medication Management: Continue current medications. The risks and benefits of medication were discussed with the patient.   · Continue Celexa 40mg po q day  · Continue Ativan 1mg po q day prn  · Continue Vyvanse 30 mg po daily ( 3 months Rx printed)  · Continue Wellbutrin  mg po daily   · Continue Trazodone 100 mg po q hs PRN insomnia  · Continue individual psychotherapy with Debora Zamora LPC  · Reviewed Safety Plan and emergency contacts.     Return to Clinic: 3 months

## 2019-06-12 ENCOUNTER — PATIENT MESSAGE (OUTPATIENT)
Dept: PSYCHIATRY | Facility: CLINIC | Age: 34
End: 2019-06-12

## 2019-06-17 DIAGNOSIS — I10 ESSENTIAL HYPERTENSION: ICD-10-CM

## 2019-06-18 RX ORDER — AMLODIPINE BESYLATE 10 MG/1
TABLET ORAL
Qty: 90 TABLET | Refills: 0 | Status: SHIPPED | OUTPATIENT
Start: 2019-06-18 | End: 2019-11-18 | Stop reason: SDUPTHER

## 2019-06-24 ENCOUNTER — PATIENT MESSAGE (OUTPATIENT)
Dept: PSYCHIATRY | Facility: CLINIC | Age: 34
End: 2019-06-24

## 2019-08-01 ENCOUNTER — PATIENT MESSAGE (OUTPATIENT)
Dept: HEMATOLOGY/ONCOLOGY | Facility: CLINIC | Age: 34
End: 2019-08-01

## 2019-08-29 ENCOUNTER — PATIENT MESSAGE (OUTPATIENT)
Dept: PSYCHIATRY | Facility: CLINIC | Age: 34
End: 2019-08-29

## 2019-08-29 DIAGNOSIS — F90.1 ATTENTION DEFICIT HYPERACTIVITY DISORDER (ADHD), PREDOMINANTLY HYPERACTIVE TYPE: ICD-10-CM

## 2019-08-29 RX ORDER — LISDEXAMFETAMINE DIMESYLATE 30 MG/1
30 CAPSULE ORAL EVERY MORNING
Qty: 30 CAPSULE | Refills: 0 | Status: SHIPPED | OUTPATIENT
Start: 2019-08-29 | End: 2019-10-01 | Stop reason: SDUPTHER

## 2019-09-20 ENCOUNTER — OFFICE VISIT (OUTPATIENT)
Dept: DERMATOLOGY | Facility: CLINIC | Age: 34
End: 2019-09-20
Payer: COMMERCIAL

## 2019-09-20 DIAGNOSIS — D22.9 MULTIPLE BENIGN NEVI: ICD-10-CM

## 2019-09-20 DIAGNOSIS — L21.9 ACUTE SEBORRHEIC DERMATITIS: Primary | ICD-10-CM

## 2019-09-20 DIAGNOSIS — L98.8 RHYTIDES: ICD-10-CM

## 2019-09-20 DIAGNOSIS — Z12.83 SKIN CANCER SCREENING: ICD-10-CM

## 2019-09-20 PROCEDURE — 99999 PR PBB SHADOW E&M-EST. PATIENT-LVL II: CPT | Mod: PBBFAC,,, | Performed by: DERMATOLOGY

## 2019-09-20 PROCEDURE — 99213 OFFICE O/P EST LOW 20 MIN: CPT | Mod: S$GLB,,, | Performed by: DERMATOLOGY

## 2019-09-20 PROCEDURE — 99999 PR PBB SHADOW E&M-EST. PATIENT-LVL II: ICD-10-PCS | Mod: PBBFAC,,, | Performed by: DERMATOLOGY

## 2019-09-20 PROCEDURE — 99213 PR OFFICE/OUTPT VISIT, EST, LEVL III, 20-29 MIN: ICD-10-PCS | Mod: S$GLB,,, | Performed by: DERMATOLOGY

## 2019-09-20 RX ORDER — TRETINOIN 0.25 MG/G
CREAM TOPICAL
Qty: 45 G | Refills: 3 | Status: SHIPPED | OUTPATIENT
Start: 2019-09-20 | End: 2020-09-30

## 2019-09-20 RX ORDER — TRIAMCINOLONE ACETONIDE 0.25 MG/ML
LOTION TOPICAL
Qty: 1 BOTTLE | Refills: 1 | Status: SHIPPED | OUTPATIENT
Start: 2019-09-20 | End: 2019-12-04

## 2019-09-20 NOTE — PATIENT INSTRUCTIONS

## 2019-09-20 NOTE — PROGRESS NOTES
"  Subjective:       Patient ID:  Ken Anderson is a 34 y.o. male who presents for   Chief Complaint   Patient presents with    Skin Check     HPI  Pt has paroxysmal nocturnal hemoglobinuria and aplastic anemia and is on a new medication for it x 2 wks (gets infusions at [facility]).    Patient is here today for a "mole" check.   Pt has a history of  Moderate to severe sun exposure in the past.   Pt recalls several blistering sunburns in the past- Yes  Pt has history of tanning bed use- No  Pt has  had moles removed in the past- No  Pt has history of melanoma in first degree relatives-  Not that he knows of    Pt presents today for UBSE. He would also like a refill of tretinoin.  C/o pink rash on face, occasionally itchy and flaky, no current tx, present x about a week.    Review of Systems   Constitutional: Negative for fever, chills, weight loss, weight gain, fatigue, night sweats and malaise.   Skin: Positive for activity-related sunscreen use and wears hat. Negative for daily sunscreen use and recent sunburn.   Hematologic/Lymphatic: Bruises/bleeds easily.        Objective:    Physical Exam   Constitutional: He appears well-developed and well-nourished. No distress.   Neurological: He is alert and oriented to person, place, and time. He is not disoriented.   Psychiatric: He has a normal mood and affect.   Skin:   Areas Examined (abnormalities noted in diagram):   Head / Face Inspection Performed  Neck Inspection Performed  Chest / Axilla Inspection Performed  Abdomen Inspection Performed  Back Inspection Performed  RUE Inspected  LUE Inspection Performed                   Diagram Legend     Erythematous scaling macule/papule c/w actinic keratosis       Vascular papule c/w angioma      Pigmented verrucoid papule/plaque c/w seborrheic keratosis      Yellow umbilicated papule c/w sebaceous hyperplasia      Irregularly shaped tan macule c/w lentigo     1-2 mm smooth white papules consistent with Milia      " Movable subcutaneous cyst with punctum c/w epidermal inclusion cyst      Subcutaneous movable cyst c/w pilar cyst      Firm pink to brown papule c/w dermatofibroma      Pedunculated fleshy papule(s) c/w skin tag(s)      Evenly pigmented macule c/w junctional nevus     Mildly variegated pigmented, slightly irregular-bordered macule c/w mildly atypical nevus      Flesh colored to evenly pigmented papule c/w intradermal nevus       Pink pearly papule/plaque c/w basal cell carcinoma      Erythematous hyperkeratotic cursted plaque c/w SCC      Surgical scar with no sign of skin cancer recurrence      Open and closed comedones      Inflammatory papules and pustules      Verrucoid papule consistent consistent with wart     Erythematous eczematous patches and plaques     Dystrophic onycholytic nail with subungual debris c/w onychomycosis     Umbilicated papule    Erythematous-base heme-crusted tan verrucoid plaque consistent with inflamed seborrheic keratosis     Erythematous Silvery Scaling Plaque c/w Psoriasis     See annotation      Assessment / Plan:        Acute seborrheic dermatitis  -     triamcinolone acetonide 0.025 % Lotn; AAA face bid prn red and/or scaly  Dispense: 1 Bottle; Refill: 1  Pt already has topical ketoconazole. Recommended using both together while flared.    Rhytides  -     tretinoin (RETIN-A) 0.025 % cream; Apply small amount to entire face qhs. Wash off qam and apply sunscreen.  Dispense: 45 g; Refill: 3  Counseled pt that the retinoid may make the skin dry, red, and irritated. May moisturize daily with a non-comedogenic moisturizer. If still dry, would recommend using the retinoid every other night or less frequently as tolerated. Avoid using around corners of eyes, nose, and mouth.  Patient instructed in importance of daily broad spectrum sunscreen use with spf at least 30. Sun avoidance and topical protection/protective clothing discussed.    Multiple benign nevi  Benign-appearing on exam today.  Counseled pt to monitor mole(s) and return to clinic if any changes noted or symptoms (bleeding, itching, pain, etc) noted. Brochure provided.    Skin cancer screening  Upper body skin examination performed today including at least 6 points as noted in physical examination. No lesions suspicious for malignancy noted.  Patient instructed in importance of daily broad spectrum sunscreen use with spf at least 30. Sun avoidance and topical protection/protective clothing discussed.    Return to clinic in about 1 year or sooner for any concerns.

## 2019-09-27 ENCOUNTER — TELEPHONE (OUTPATIENT)
Dept: OPTOMETRY | Facility: CLINIC | Age: 34
End: 2019-09-27

## 2019-10-01 ENCOUNTER — DOCUMENTATION ONLY (OUTPATIENT)
Dept: DERMATOLOGY | Facility: CLINIC | Age: 34
End: 2019-10-01

## 2019-10-01 ENCOUNTER — PATIENT MESSAGE (OUTPATIENT)
Dept: PSYCHIATRY | Facility: CLINIC | Age: 34
End: 2019-10-01

## 2019-10-01 DIAGNOSIS — F90.1 ATTENTION DEFICIT HYPERACTIVITY DISORDER (ADHD), PREDOMINANTLY HYPERACTIVE TYPE: ICD-10-CM

## 2019-10-01 RX ORDER — LISDEXAMFETAMINE DIMESYLATE 30 MG/1
30 CAPSULE ORAL EVERY MORNING
Qty: 30 CAPSULE | Refills: 0 | Status: SHIPPED | OUTPATIENT
Start: 2019-10-01 | End: 2019-10-29 | Stop reason: SDUPTHER

## 2019-10-25 ENCOUNTER — TELEPHONE (OUTPATIENT)
Dept: OPTOMETRY | Facility: CLINIC | Age: 34
End: 2019-10-25

## 2019-10-28 ENCOUNTER — PATIENT MESSAGE (OUTPATIENT)
Dept: PSYCHIATRY | Facility: CLINIC | Age: 34
End: 2019-10-28

## 2019-10-29 ENCOUNTER — OFFICE VISIT (OUTPATIENT)
Dept: OPTOMETRY | Facility: CLINIC | Age: 34
End: 2019-10-29
Payer: COMMERCIAL

## 2019-10-29 ENCOUNTER — TELEPHONE (OUTPATIENT)
Dept: PSYCHIATRY | Facility: CLINIC | Age: 34
End: 2019-10-29

## 2019-10-29 DIAGNOSIS — H40.053 BILATERAL OCULAR HYPERTENSION: Primary | ICD-10-CM

## 2019-10-29 DIAGNOSIS — H52.7 REFRACTIVE ERROR: ICD-10-CM

## 2019-10-29 DIAGNOSIS — F90.1 ATTENTION DEFICIT HYPERACTIVITY DISORDER (ADHD), PREDOMINANTLY HYPERACTIVE TYPE: ICD-10-CM

## 2019-10-29 PROCEDURE — 92014 COMPRE OPH EXAM EST PT 1/>: CPT | Mod: S$GLB,,, | Performed by: OPTOMETRIST

## 2019-10-29 PROCEDURE — 92015 DETERMINE REFRACTIVE STATE: CPT | Mod: S$GLB,,, | Performed by: OPTOMETRIST

## 2019-10-29 PROCEDURE — 99999 PR PBB SHADOW E&M-EST. PATIENT-LVL II: CPT | Mod: PBBFAC,,, | Performed by: OPTOMETRIST

## 2019-10-29 PROCEDURE — 99999 PR PBB SHADOW E&M-EST. PATIENT-LVL II: ICD-10-PCS | Mod: PBBFAC,,, | Performed by: OPTOMETRIST

## 2019-10-29 PROCEDURE — 92014 PR EYE EXAM, EST PATIENT,COMPREHESV: ICD-10-PCS | Mod: S$GLB,,, | Performed by: OPTOMETRIST

## 2019-10-29 PROCEDURE — 92015 PR REFRACTION: ICD-10-PCS | Mod: S$GLB,,, | Performed by: OPTOMETRIST

## 2019-10-29 RX ORDER — LISDEXAMFETAMINE DIMESYLATE 30 MG/1
30 CAPSULE ORAL EVERY MORNING
Qty: 30 CAPSULE | Refills: 0 | Status: SHIPPED | OUTPATIENT
Start: 2019-10-29 | End: 2019-11-25 | Stop reason: SDUPTHER

## 2019-10-29 NOTE — PROGRESS NOTES
HPI     Pt here for Annual eye Exam  Pt deny any visual disturbance since last visit.  Vision currently stable.  On a new drug, complement enhancer vickey, doc at [facility]    Last edited by Rey Kitchen, OD on 10/29/2019  9:04 AM. (History)              Assessment /Plan     For exam results, see Encounter Report.    Bilateral ocular hypertension    Refractive error      1. Monitor condition. Patient to report any changes. RTC 4-6 mos recheck.  2. New Spec Rx given. Different lens options discussed with patient. RTC 1 year full exam.  *on new drug from [facility] doc, complement enhancer, otumiliris, will check with doc and let me know of side effects

## 2019-10-31 ENCOUNTER — TELEPHONE (OUTPATIENT)
Dept: PSYCHIATRY | Facility: CLINIC | Age: 34
End: 2019-10-31

## 2019-11-04 ENCOUNTER — OFFICE VISIT (OUTPATIENT)
Dept: OPTOMETRY | Facility: CLINIC | Age: 34
End: 2019-11-04
Payer: COMMERCIAL

## 2019-11-04 DIAGNOSIS — H40.053 BILATERAL OCULAR HYPERTENSION: Primary | ICD-10-CM

## 2019-11-04 PROCEDURE — 92012 PR EYE EXAM, EST PATIENT,INTERMED: ICD-10-PCS | Mod: S$GLB,,, | Performed by: OPTOMETRIST

## 2019-11-04 PROCEDURE — 92012 INTRM OPH EXAM EST PATIENT: CPT | Mod: S$GLB,,, | Performed by: OPTOMETRIST

## 2019-11-04 PROCEDURE — 99999 PR PBB SHADOW E&M-EST. PATIENT-LVL II: CPT | Mod: PBBFAC,,, | Performed by: OPTOMETRIST

## 2019-11-04 PROCEDURE — 99999 PR PBB SHADOW E&M-EST. PATIENT-LVL II: ICD-10-PCS | Mod: PBBFAC,,, | Performed by: OPTOMETRIST

## 2019-11-18 DIAGNOSIS — I10 ESSENTIAL HYPERTENSION: ICD-10-CM

## 2019-11-18 RX ORDER — AMLODIPINE BESYLATE 10 MG/1
TABLET ORAL
Qty: 90 TABLET | Refills: 0 | Status: SHIPPED | OUTPATIENT
Start: 2019-11-18 | End: 2020-02-17

## 2019-11-25 ENCOUNTER — TELEPHONE (OUTPATIENT)
Dept: PSYCHIATRY | Facility: HOSPITAL | Age: 34
End: 2019-11-25

## 2019-11-25 ENCOUNTER — PATIENT MESSAGE (OUTPATIENT)
Dept: PSYCHIATRY | Facility: CLINIC | Age: 34
End: 2019-11-25

## 2019-11-25 DIAGNOSIS — F90.1 ATTENTION DEFICIT HYPERACTIVITY DISORDER (ADHD), PREDOMINANTLY HYPERACTIVE TYPE: ICD-10-CM

## 2019-11-25 RX ORDER — LISDEXAMFETAMINE DIMESYLATE 30 MG/1
30 CAPSULE ORAL EVERY MORNING
Qty: 30 CAPSULE | Refills: 0 | Status: SHIPPED | OUTPATIENT
Start: 2019-11-25 | End: 2020-01-02 | Stop reason: SDUPTHER

## 2019-12-04 ENCOUNTER — OFFICE VISIT (OUTPATIENT)
Dept: PRIMARY CARE CLINIC | Facility: CLINIC | Age: 34
End: 2019-12-04
Attending: FAMILY MEDICINE
Payer: COMMERCIAL

## 2019-12-04 VITALS
BODY MASS INDEX: 38.36 KG/M2 | HEART RATE: 75 BPM | HEIGHT: 76 IN | DIASTOLIC BLOOD PRESSURE: 88 MMHG | TEMPERATURE: 99 F | SYSTOLIC BLOOD PRESSURE: 142 MMHG | OXYGEN SATURATION: 98 % | WEIGHT: 315 LBS

## 2019-12-04 DIAGNOSIS — I10 ESSENTIAL HYPERTENSION: Primary | ICD-10-CM

## 2019-12-04 DIAGNOSIS — D61.9 APLASTIC ANEMIA: ICD-10-CM

## 2019-12-04 PROCEDURE — 99213 PR OFFICE/OUTPT VISIT, EST, LEVL III, 20-29 MIN: ICD-10-PCS | Mod: S$GLB,,, | Performed by: FAMILY MEDICINE

## 2019-12-04 PROCEDURE — 3077F PR MOST RECENT SYSTOLIC BLOOD PRESSURE >= 140 MM HG: ICD-10-PCS | Mod: CPTII,S$GLB,, | Performed by: FAMILY MEDICINE

## 2019-12-04 PROCEDURE — 99213 OFFICE O/P EST LOW 20 MIN: CPT | Mod: S$GLB,,, | Performed by: FAMILY MEDICINE

## 2019-12-04 PROCEDURE — 3079F PR MOST RECENT DIASTOLIC BLOOD PRESSURE 80-89 MM HG: ICD-10-PCS | Mod: CPTII,S$GLB,, | Performed by: FAMILY MEDICINE

## 2019-12-04 PROCEDURE — 3077F SYST BP >= 140 MM HG: CPT | Mod: CPTII,S$GLB,, | Performed by: FAMILY MEDICINE

## 2019-12-04 PROCEDURE — 3008F BODY MASS INDEX DOCD: CPT | Mod: CPTII,S$GLB,, | Performed by: FAMILY MEDICINE

## 2019-12-04 PROCEDURE — 99999 PR PBB SHADOW E&M-EST. PATIENT-LVL III: CPT | Mod: PBBFAC,,, | Performed by: FAMILY MEDICINE

## 2019-12-04 PROCEDURE — 3008F PR BODY MASS INDEX (BMI) DOCUMENTED: ICD-10-PCS | Mod: CPTII,S$GLB,, | Performed by: FAMILY MEDICINE

## 2019-12-04 PROCEDURE — 3079F DIAST BP 80-89 MM HG: CPT | Mod: CPTII,S$GLB,, | Performed by: FAMILY MEDICINE

## 2019-12-04 PROCEDURE — 99999 PR PBB SHADOW E&M-EST. PATIENT-LVL III: ICD-10-PCS | Mod: PBBFAC,,, | Performed by: FAMILY MEDICINE

## 2019-12-04 NOTE — PROGRESS NOTES
Subjective:       Patient ID: Ken Anderson is a 34 y.o. male.    Chief Complaint: Sore Throat     Pt presents today with cough, cold and congestin x 5 days. Per pt,  has been having a tickling at the back of  throat that when that begins,  knows that sinus is congested. Pt states that his nephew has been sick with similar symptoms last week.  felt the tickle a month ago. Most recently had mucous in his chest,cough, cold and fevers. Subjective fever, pt doesn't have documented temp.  Pt has not been taking anything. Is not on any allergy meds or decongestants    Does have aplastic anemia and is immunocompromised per pt        Review of Systems   Constitutional: Positive for activity change, appetite change, fatigue and fever. Negative for chills, diaphoresis and unexpected weight change.   HENT: Positive for congestion, postnasal drip, sinus pressure and sore throat. Negative for ear discharge, ear pain, facial swelling, hearing loss, nosebleeds and tinnitus.    Eyes: Negative.  Negative for photophobia and visual disturbance.   Respiratory: Positive for cough. Negative for chest tightness and shortness of breath.    Cardiovascular: Negative.  Negative for chest pain, palpitations and leg swelling.   Gastrointestinal: Negative.    Genitourinary: Negative.    Musculoskeletal: Negative.  Negative for neck pain and neck stiffness.   Skin: Negative.    Neurological: Negative.  Negative for dizziness, weakness and light-headedness.   Psychiatric/Behavioral: Negative.    All other systems reviewed and are negative.      Objective:      Physical Exam   Constitutional: He is oriented to person, place, and time. He appears well-developed and well-nourished.   HENT:   Head: Normocephalic and atraumatic.   Right Ear: Hearing normal. There is swelling. No drainage. Tympanic membrane is injected. Tympanic membrane is not erythematous and not bulging. Tympanic membrane mobility is normal. A middle ear effusion is present.    Left Ear: Hearing normal. There is swelling. Tympanic membrane is bulging. Tympanic membrane is not injected and not erythematous. Tympanic membrane mobility is normal. A middle ear effusion is present.   Nose: Mucosal edema and rhinorrhea present. Right sinus exhibits no maxillary sinus tenderness and no frontal sinus tenderness. Left sinus exhibits no maxillary sinus tenderness and no frontal sinus tenderness.   Mouth/Throat: Uvula is midline and mucous membranes are normal. Posterior oropharyngeal edema and posterior oropharyngeal erythema present. No oropharyngeal exudate or tonsillar abscesses.   Eyes: Pupils are equal, round, and reactive to light. Conjunctivae and EOM are normal. Right eye exhibits no discharge. Left eye exhibits no discharge.   Neck: Normal range of motion. Neck supple. No thyromegaly present.   Cardiovascular: Normal rate, regular rhythm, normal heart sounds and intact distal pulses.   No murmur heard.  Pulmonary/Chest: Effort normal and breath sounds normal. No respiratory distress. He has no wheezes. He has no rales. He exhibits no tenderness.   Abdominal: Soft. Bowel sounds are normal. He exhibits no distension.   Musculoskeletal: Normal range of motion. He exhibits no edema or tenderness.   Lymphadenopathy:     He has no cervical adenopathy.   Neurological: He is alert and oriented to person, place, and time.   Skin: Skin is warm and dry. No rash noted. No erythema.   Psychiatric: He has a normal mood and affect. His behavior is normal. Judgment and thought content normal.       Assessment:     Plan:     Essential hypertension    Aplastic anemia      BP elevated. Pt has not taken his med as of yet  Rapid strep neg  Appears to be viral and advised pt to get rest, fluids, tylenol prn, decongest and flonase, saline rinses  rtc if symptoms worsen

## 2019-12-17 ENCOUNTER — OFFICE VISIT (OUTPATIENT)
Dept: PRIMARY CARE CLINIC | Facility: CLINIC | Age: 34
End: 2019-12-17
Payer: COMMERCIAL

## 2019-12-17 ENCOUNTER — HOSPITAL ENCOUNTER (OUTPATIENT)
Dept: RADIOLOGY | Facility: HOSPITAL | Age: 34
Discharge: HOME | End: 2019-12-17
Attending: FAMILY MEDICINE
Payer: COMMERCIAL

## 2019-12-17 VITALS
TEMPERATURE: 99 F | HEART RATE: 90 BPM | WEIGHT: 315 LBS | HEIGHT: 76 IN | BODY MASS INDEX: 38.36 KG/M2 | OXYGEN SATURATION: 95 % | SYSTOLIC BLOOD PRESSURE: 122 MMHG | DIASTOLIC BLOOD PRESSURE: 70 MMHG

## 2019-12-17 DIAGNOSIS — Z87.891 HISTORY OF SMOKING: ICD-10-CM

## 2019-12-17 DIAGNOSIS — R05.9 COUGH: ICD-10-CM

## 2019-12-17 DIAGNOSIS — G47.33 OSA (OBSTRUCTIVE SLEEP APNEA): ICD-10-CM

## 2019-12-17 DIAGNOSIS — R50.9 FEVER, UNSPECIFIED FEVER CAUSE: ICD-10-CM

## 2019-12-17 DIAGNOSIS — J11.1 INFLUENZA: Primary | ICD-10-CM

## 2019-12-17 DIAGNOSIS — I10 ESSENTIAL HYPERTENSION: ICD-10-CM

## 2019-12-17 DIAGNOSIS — D61.9 APLASTIC ANEMIA: ICD-10-CM

## 2019-12-17 PROBLEM — Z72.0 TOBACCO ABUSE DISORDER: Status: RESOLVED | Noted: 2017-05-10 | Resolved: 2019-12-17

## 2019-12-17 LAB
CTP QC/QA: YES
FLUAV AG NPH QL: POSITIVE
FLUBV AG NPH QL: NEGATIVE

## 2019-12-17 PROCEDURE — 87804 POCT INFLUENZA A/B: ICD-10-PCS | Mod: QW,S$GLB,, | Performed by: FAMILY MEDICINE

## 2019-12-17 PROCEDURE — 99999 PR PBB SHADOW E&M-EST. PATIENT-LVL III: CPT | Mod: PBBFAC,,, | Performed by: FAMILY MEDICINE

## 2019-12-17 PROCEDURE — 71046 X-RAY EXAM CHEST 2 VIEWS: CPT | Mod: 26,,, | Performed by: RADIOLOGY

## 2019-12-17 PROCEDURE — 71046 X-RAY EXAM CHEST 2 VIEWS: CPT | Mod: TC,PN

## 2019-12-17 PROCEDURE — 99213 OFFICE O/P EST LOW 20 MIN: CPT | Mod: 25,S$GLB,, | Performed by: FAMILY MEDICINE

## 2019-12-17 PROCEDURE — 99999 PR PBB SHADOW E&M-EST. PATIENT-LVL III: ICD-10-PCS | Mod: PBBFAC,,, | Performed by: FAMILY MEDICINE

## 2019-12-17 PROCEDURE — 87804 INFLUENZA ASSAY W/OPTIC: CPT | Mod: QW,S$GLB,, | Performed by: FAMILY MEDICINE

## 2019-12-17 PROCEDURE — 99213 PR OFFICE/OUTPT VISIT, EST, LEVL III, 20-29 MIN: ICD-10-PCS | Mod: 25,S$GLB,, | Performed by: FAMILY MEDICINE

## 2019-12-17 PROCEDURE — 71046 XR CHEST PA AND LATERAL: ICD-10-PCS | Mod: 26,,, | Performed by: RADIOLOGY

## 2019-12-17 RX ORDER — AZITHROMYCIN 250 MG/1
TABLET, FILM COATED ORAL
Qty: 6 TABLET | Refills: 0 | Status: SHIPPED | OUTPATIENT
Start: 2019-12-17 | End: 2020-06-24

## 2019-12-17 RX ORDER — PROMETHAZINE HYDROCHLORIDE AND CODEINE PHOSPHATE 6.25; 1 MG/5ML; MG/5ML
5 SOLUTION ORAL EVERY 4 HOURS PRN
Qty: 118 ML | Refills: 0 | Status: SHIPPED | OUTPATIENT
Start: 2019-12-17 | End: 2019-12-27

## 2019-12-17 RX ORDER — OSELTAMIVIR PHOSPHATE 75 MG/1
75 CAPSULE ORAL 2 TIMES DAILY
Qty: 10 CAPSULE | Refills: 0 | Status: SHIPPED | OUTPATIENT
Start: 2019-12-17 | End: 2019-12-22

## 2019-12-17 RX ORDER — ALBUTEROL SULFATE 90 UG/1
2 AEROSOL, METERED RESPIRATORY (INHALATION) EVERY 6 HOURS PRN
Qty: 18 G | Refills: 0 | Status: SHIPPED | OUTPATIENT
Start: 2019-12-17 | End: 2022-07-08

## 2019-12-17 RX ORDER — ACETAMINOPHEN 325 MG/1
325 TABLET ORAL EVERY 6 HOURS PRN
COMMUNITY

## 2019-12-17 NOTE — PROGRESS NOTES
Subjective:   Patient ID: Ken Anderson is a 34 y.o. male.    Chief Complaint: Nasal Congestion; Fever; Sore Throat; Generalized Body Aches; and Cough      Influenza   This is a new problem. The current episode started in the past 7 days. The problem occurs constantly. The problem has been rapidly worsening. Associated symptoms include anorexia, arthralgias, chills, congestion, coughing, diaphoresis, fatigue, a fever, headaches, myalgias, nausea, neck pain, a sore throat and swollen glands. Pertinent negatives include no abdominal pain or change in bowel habit. He has tried acetaminophen, NSAIDs, rest, sleep, relaxation, position changes, lying down and drinking for the symptoms. The treatment provided no relief.       Patient queried and denies any further complaints.        ALLERGIES AND MEDICATIONS: updated and reviewed.  Review of patient's allergies indicates:   Allergen Reactions    No known drug allergies        Current Outpatient Medications:     acetaminophen (TYLENOL) 325 MG tablet, Take 325 mg by mouth every 6 (six) hours as needed for Pain., Disp: , Rfl:     amLODIPine (NORVASC) 10 MG tablet, TAKE 1 TABLET BY MOUTH EVERY DAY, Disp: 90 tablet, Rfl: 0    buPROPion (WELLBUTRIN XL) 300 MG 24 hr tablet, Take 1 tablet (300 mg total) by mouth once daily., Disp: 90 tablet, Rfl: 1    citalopram (CELEXA) 40 MG tablet, TAKE 1 TABLET(40 MG) BY MOUTH EVERY DAY, Disp: 90 tablet, Rfl: 1    ELIQUIS 5 mg Tab, TK 1 T PO BID AS DIRECTED, Disp: , Rfl: 3    ketoconazole (NIZORAL) 2 % cream, AAA bid, Disp: 60 g, Rfl: 3    ketoconazole (NIZORAL) 2 % shampoo, Wash hair and beard 3x/week, Disp: 120 mL, Rfl: 11    lisdexamfetamine (VYVANSE) 30 MG capsule, Take 1 capsule (30 mg total) by mouth every morning., Disp: 30 capsule, Rfl: 0    LORazepam (ATIVAN) 1 MG tablet, TAKE 1 TABLET(1 MG) BY MOUTH DAILY AS NEEDED FOR ANXIETY, Disp: 30 tablet, Rfl: 5    nystatin-triamcinolone (MYCOLOG) ointment, AAA at corner of  mouth BID, Disp: 30 g, Rfl: 1    ravulizumab-cwvz (ULTOMIRIS) 300 mg/30 mL (10 mg/mL) Soln, Inject into the vein., Disp: , Rfl:     traZODone (DESYREL) 100 MG tablet, TAKE 1/2 TO 1 TABLET BY MOUTH AT BEDTIME FOR INSOMNIA, Disp: 90 tablet, Rfl: 1    tretinoin (RETIN-A) 0.025 % cream, Apply small amount to entire face qhs. Wash off qam and apply sunscreen., Disp: 45 g, Rfl: 3    valACYclovir (VALTREX) 1000 MG tablet, TAKE 2 TABLETS BY MOUTH TWICE DAILY FOR 1 DAY; BEGIN AT FIRST SIGN OF OUTBREAK, Disp: 12 tablet, Rfl: 0    albuterol (PROVENTIL/VENTOLIN HFA) 90 mcg/actuation inhaler, Inhale 2 puffs into the lungs every 6 (six) hours as needed for Wheezing. Rescue, Disp: 18 g, Rfl: 0    azithromycin (Z-EDITA) 250 MG tablet, Take 2 tablets by mouth on day 1; Take 1 tablet by mouth on days 2-5, Disp: 6 tablet, Rfl: 0    CALCIUM CITRATE/VITAMIN D3 (CITRACAL REGULAR ORAL), Take by mouth., Disp: , Rfl:     fexofenadine (ALLEGRA) 30 MG tablet, Take 30 mg by mouth as needed. , Disp: , Rfl:     FLUARIX QUAD 5974-8232, PF, 60 mcg (15 mcg x 4)/0.5 mL vaccine, , Disp: , Rfl:     INV sodium chloride 0.9 % SolP 90 mL with INV eculizumab/placebo 300 mg/30 mL Soln 900 mg, Inject 900 mg into the vein once. FOR INVESTIGATIONAL USE ONLY, Disp: , Rfl:     oseltamivir (TAMIFLU) 75 MG capsule, Take 1 capsule (75 mg total) by mouth 2 (two) times daily. for 5 days, Disp: 10 capsule, Rfl: 0    promethazine-codeine 6.25-10 mg/5 ml (PHENERGAN WITH CODEINE) 6.25-10 mg/5 mL syrup, Take 5 mLs by mouth every 4 (four) hours as needed for Cough. Do not take and drive. Dx J20.9 qty medically necessary, Disp: 118 mL, Rfl: 0    Review of Systems   Constitutional: Positive for chills, diaphoresis, fatigue and fever.   HENT: Positive for congestion and sore throat.    Respiratory: Positive for cough.    Gastrointestinal: Positive for anorexia and nausea. Negative for abdominal pain and change in bowel habit.   Musculoskeletal: Positive for  "arthralgias, myalgias and neck pain.   Neurological: Positive for headaches.       Objective:     Vitals:    12/17/19 0846   BP: 122/70   Pulse: 90   Temp: 99.1 °F (37.3 °C)   TempSrc: Oral   SpO2: 95%   Weight: (!) 152.8 kg (336 lb 13.8 oz)   Height: 6' 4" (1.93 m)   PainSc:   2   PainLoc: Throat     Body mass index is 41 kg/m².    Physical Exam   Constitutional: He is oriented to person, place, and time. He appears well-developed and well-nourished. He appears ill.   HENT:   Head: Normocephalic and atraumatic.   Right Ear: External ear normal.   Left Ear: External ear normal.   Mouth/Throat: Oropharynx is clear and moist.   Eyes: Conjunctivae are normal. Right eye exhibits no discharge. Left eye exhibits no discharge. No scleral icterus.   Cardiovascular: Normal rate, regular rhythm and normal heart sounds.   Pulmonary/Chest: Effort normal. He has decreased breath sounds in the right upper field, the right middle field, the right lower field, the left upper field, the left middle field and the left lower field. He has no wheezes. He has no rhonchi. He has no rales.   Neurological: He is alert and oriented to person, place, and time.   Skin: Skin is warm. He is diaphoretic. There is pallor.   Psychiatric: He has a normal mood and affect. His behavior is normal.   Nursing note and vitals reviewed.      Assessment and Plan:   Ken was seen today for nasal congestion, fever, sore throat, generalized body aches and cough.    Diagnoses and all orders for this visit:    Influenza    Aplastic anemia    Fever, unspecified fever cause  -     POCT Influenza A/B  -     X-Ray Chest PA And Lateral; Future    Cough  -     X-Ray Chest PA And Lateral; Future    Essential hypertension    JUSTO (obstructive sleep apnea)    History of smoking    BMI 40.0-44.9, adult    Other orders  -     azithromycin (Z-EDITA) 250 MG tablet; Take 2 tablets by mouth on day 1; Take 1 tablet by mouth on days 2-5  -     promethazine-codeine 6.25-10 mg/5 " ml (PHENERGAN WITH CODEINE) 6.25-10 mg/5 mL syrup; Take 5 mLs by mouth every 4 (four) hours as needed for Cough. Do not take and drive. Dx J20.9 qty medically necessary  -     oseltamivir (TAMIFLU) 75 MG capsule; Take 1 capsule (75 mg total) by mouth 2 (two) times daily. for 5 days  -     albuterol (PROVENTIL/VENTOLIN HFA) 90 mcg/actuation inhaler; Inhale 2 puffs into the lungs every 6 (six) hours as needed for Wheezing. 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  OTC Zyrtec as directed.    With his aplastic anemia and high risk of developing secondary bacterial infection, abx if needed in 48 hrs.     Patient expressed understanding of the plan as evidenced by brief summary back to me.       Follow up in about 2 days (around 12/19/2019), or if symptoms worsen or fail to improve.    THIS NOTE WILL BE SHARED WITH THE PATIENT.

## 2020-01-01 NOTE — PROGRESS NOTES
HPI     Patient Is on drug Ultomiris. Side effects of vision changes. First   started this drug in September. Was previously taking Silariz - started   that October 2018. Concerned with high IOP at last visit and patient   wanted to recheck IOP today.    Last edited by Magdalena Escalera, OD on 1/1/2020  1:18 PM. (History)            Assessment /Plan     For exam results, see Encounter Report.    Bilateral ocular hypertension      1. Normal IOP today. Patient not currently taking any drops. Continue to monitor IOP. RTC in 6 months to recheck IOP.

## 2020-01-02 ENCOUNTER — TELEPHONE (OUTPATIENT)
Dept: PSYCHIATRY | Facility: HOSPITAL | Age: 35
End: 2020-01-02

## 2020-01-02 ENCOUNTER — PATIENT MESSAGE (OUTPATIENT)
Dept: PSYCHIATRY | Facility: CLINIC | Age: 35
End: 2020-01-02

## 2020-01-02 DIAGNOSIS — F90.1 ATTENTION DEFICIT HYPERACTIVITY DISORDER (ADHD), PREDOMINANTLY HYPERACTIVE TYPE: ICD-10-CM

## 2020-01-02 RX ORDER — LISDEXAMFETAMINE DIMESYLATE 30 MG/1
30 CAPSULE ORAL EVERY MORNING
Qty: 30 CAPSULE | Refills: 0 | Status: SHIPPED | OUTPATIENT
Start: 2020-01-02 | End: 2020-02-04 | Stop reason: SDUPTHER

## 2020-02-03 ENCOUNTER — PATIENT MESSAGE (OUTPATIENT)
Dept: PSYCHIATRY | Facility: CLINIC | Age: 35
End: 2020-02-03

## 2020-02-04 ENCOUNTER — TELEPHONE (OUTPATIENT)
Dept: PSYCHIATRY | Facility: CLINIC | Age: 35
End: 2020-02-04

## 2020-02-04 DIAGNOSIS — F90.1 ATTENTION DEFICIT HYPERACTIVITY DISORDER (ADHD), PREDOMINANTLY HYPERACTIVE TYPE: ICD-10-CM

## 2020-02-04 RX ORDER — LISDEXAMFETAMINE DIMESYLATE 30 MG/1
30 CAPSULE ORAL EVERY MORNING
Qty: 30 CAPSULE | Refills: 0 | Status: SHIPPED | OUTPATIENT
Start: 2020-02-04 | End: 2020-03-06 | Stop reason: SDUPTHER

## 2020-02-16 DIAGNOSIS — I10 ESSENTIAL HYPERTENSION: ICD-10-CM

## 2020-02-17 RX ORDER — AMLODIPINE BESYLATE 10 MG/1
TABLET ORAL
Qty: 90 TABLET | Refills: 0 | Status: SHIPPED | OUTPATIENT
Start: 2020-02-17 | End: 2020-05-21

## 2020-02-19 ENCOUNTER — PATIENT MESSAGE (OUTPATIENT)
Dept: PSYCHIATRY | Facility: CLINIC | Age: 35
End: 2020-02-19

## 2020-03-04 NOTE — PROGRESS NOTES
Outpatient Psychiatry Follow-Up Visit (MD/NP)    3/6/2020    Clinical Status of Patient:  Outpatient (Ambulatory)    Chief Complaint:  Ken Anderson is a 34 y.o. male who presents today for follow-up of depression, mood disorder, anxiety, attention problems and panic.  Met with patient      Last Visit: was on 5/31/19. Chart and  reviewed  .     Interval History and Content of Current Session:  Current Psych Medications  · Continue Celexa 40mg po q day  · Continue Ativan 1mg po q day prn  · Continue Vyvanse 30 mg po daily ( 3 months Rx printed)  · Continue Wellbutrin  mg po daily   · Continue Trazodone 100 mg po q hs PRN insomnia    Pt reports that he has been looking for a new job because he doesn't like management changes at his work. Denies any other situational stressors. Denies SI/HI/AVH. Denies any other side effects to medications.     Psychotherapy:  · Target symptoms: depression, distractability, anxiety   · Why chosen therapy is appropriate versus another modality: relevant to diagnosis  · Outcome monitoring methods: self-report  · Therapeutic intervention type: insight oriented psychotherapy  · Topics discussed/themes: symptom recognition  · The patient's response to the intervention is accepting. The patient's progress toward treatment goals is fair.   · Duration of intervention: 10 minutes.    Review of Systems     GENERAL: no weight change  SKIN: No rashes or lacerations   HEAD: No headaches   EYES: No exophthalmos, jaundice or blindness   EARS: No dizziness, tinnitus or hearing loss   NOSE: No changes in smell   MOUTH & THROAT: throat infection, currently on antibiotics.   CHEST: No shortness of breath, hyperventilation or cough   CARDIOVASCULAR: No tachycardia or chest pain   ABDOMEN: No nausea, vomiting, pain, constipation or diarrhea   URINARY: No frequency, dysuria or sexual dysfunction   ENDOCRINE: No polydipsia, polyuria   MUSCULOSKELETAL: No pain or stiffness of the joints  "  NEUROLOGIC: No weakness, sensory changes, seizures, confusion, memory loss, tremor or other abnormal movements      Psychiatric Review Of Systems:  sleep: yes  appetite changes: yes  weight changes: yes  energy/anergy: yes  interest/pleasure/anhedonia: yes  somatic symptoms: no  libido: yes  anxiety/panic: no    Past Medical, Family and Social History: The patient's past medical, family and social history have been reviewed and updated as appropriate within the electronic medical record - see encounter notes.    Compliance: yes    Side effects: see above    Risk Parameters:  Patient reports no suicidal ideation  Patient reports no homicidal ideation  Patient reports no self-injurious behavior  Patient reports no violent behavior    Exam (detailed: at least 9 elements; comprehensive: all 15 elements)   Constitutional  Vitals:  Most recent vital signs, dated greater than 90 days prior to this appointment, were not reviewed.   Vitals:    03/06/20 1601   BP: (!) 173/91   Pulse: 71   Weight: (!) 153 kg (337 lb 6.6 oz)   Height: 6' 4" (1.93 m)        General:  unremarkable, age appropriate, well dressed, neatly groomed     Musculoskeletal  Muscle Strength/Tone:  no dyskinesia, no dystonia, no tremor, no tic   Gait & Station:  non-ataxic     Psychiatric  Speech:  no latency; no press   Mood & Affect:  dysthymic  blunted   Thought Process:  normal and logical   Associations:  intact   Thought Content:  normal, no suicidality, no homicidality, delusions, or paranoia   Insight:  has awareness of illness   Judgement: behavior is adequate to circumstances   Orientation:  grossly intact   Memory: intact for content of interview   Language: grossly intact   Attention Span & Concentration:  able to focus   Fund of Knowledge:  intact and appropriate to age and level of education     Assessment and Diagnosis   Status/Progress: Based on the examination today, the patient's problem(s) is/are improved and adequately but not ideally " controlled.  New problems have not been presented today.   Co-morbidities are not complicating management of the primary condition.  There are no active rule-out diagnoses for this patient at this time.     General Impression:       ICD-10-CM ICD-9-CM   1. Moderate episode of recurrent major depressive disorder F33.1 296.32   2. Insomnia disorder with non-sleep disorder mental comorbidity G47.00 780.52   3. Generalized anxiety disorder F41.1 300.02   4. Panic disorder without agoraphobia F41.0 300.01   5. Attention deficit hyperactivity disorder (ADHD), predominantly hyperactive type F90.1 314.01       Intervention/Counseling/Treatment Plan   · Medication Management: Continue current medications. The risks and benefits of medication were discussed with the patient.   · Continue Celexa 40mg po q day  · Continue Ativan 1mg po q day prn  · Continue Vyvanse 30 mg po daily ( 3 months Rx printed)  · Continue Wellbutrin  mg po daily   · Continue Trazodone 100 mg po q hs PRN insomnia  · Continue individual psychotherapy with Debora Zamora LPC  · Reviewed Safety Plan and emergency contacts.     Return to Clinic: 3 months     Risks, benefits, side effects and alternative treatments discussed with patient. Patient agrees with the current plan as documented.  Encouraged Patient to keep future appointments.  Take medications as prescribed and abstain from substance abuse.  Pt to present to ED for thoughts to harm himself or others

## 2020-03-06 ENCOUNTER — OFFICE VISIT (OUTPATIENT)
Dept: PSYCHIATRY | Facility: CLINIC | Age: 35
End: 2020-03-06
Payer: COMMERCIAL

## 2020-03-06 VITALS
HEART RATE: 71 BPM | SYSTOLIC BLOOD PRESSURE: 173 MMHG | WEIGHT: 315 LBS | HEIGHT: 76 IN | DIASTOLIC BLOOD PRESSURE: 91 MMHG | BODY MASS INDEX: 38.36 KG/M2

## 2020-03-06 DIAGNOSIS — F41.1 GENERALIZED ANXIETY DISORDER: ICD-10-CM

## 2020-03-06 DIAGNOSIS — F90.1 ATTENTION DEFICIT HYPERACTIVITY DISORDER (ADHD), PREDOMINANTLY HYPERACTIVE TYPE: ICD-10-CM

## 2020-03-06 DIAGNOSIS — G47.00 INSOMNIA DISORDER WITH NON-SLEEP DISORDER MENTAL COMORBIDITY: ICD-10-CM

## 2020-03-06 DIAGNOSIS — F33.1 MODERATE EPISODE OF RECURRENT MAJOR DEPRESSIVE DISORDER: ICD-10-CM

## 2020-03-06 DIAGNOSIS — F41.0 PANIC DISORDER WITHOUT AGORAPHOBIA: ICD-10-CM

## 2020-03-06 PROCEDURE — 3077F SYST BP >= 140 MM HG: CPT | Mod: CPTII,S$GLB,, | Performed by: NURSE PRACTITIONER

## 2020-03-06 PROCEDURE — 99999 PR PBB SHADOW E&M-EST. PATIENT-LVL III: CPT | Mod: PBBFAC,,, | Performed by: NURSE PRACTITIONER

## 2020-03-06 PROCEDURE — 99213 OFFICE O/P EST LOW 20 MIN: CPT | Mod: S$GLB,,, | Performed by: NURSE PRACTITIONER

## 2020-03-06 PROCEDURE — 3008F BODY MASS INDEX DOCD: CPT | Mod: CPTII,S$GLB,, | Performed by: NURSE PRACTITIONER

## 2020-03-06 PROCEDURE — 99213 PR OFFICE/OUTPT VISIT, EST, LEVL III, 20-29 MIN: ICD-10-PCS | Mod: S$GLB,,, | Performed by: NURSE PRACTITIONER

## 2020-03-06 PROCEDURE — 3080F PR MOST RECENT DIASTOLIC BLOOD PRESSURE >= 90 MM HG: ICD-10-PCS | Mod: CPTII,S$GLB,, | Performed by: NURSE PRACTITIONER

## 2020-03-06 PROCEDURE — 3008F PR BODY MASS INDEX (BMI) DOCUMENTED: ICD-10-PCS | Mod: CPTII,S$GLB,, | Performed by: NURSE PRACTITIONER

## 2020-03-06 PROCEDURE — 3077F PR MOST RECENT SYSTOLIC BLOOD PRESSURE >= 140 MM HG: ICD-10-PCS | Mod: CPTII,S$GLB,, | Performed by: NURSE PRACTITIONER

## 2020-03-06 PROCEDURE — 3080F DIAST BP >= 90 MM HG: CPT | Mod: CPTII,S$GLB,, | Performed by: NURSE PRACTITIONER

## 2020-03-06 PROCEDURE — 99999 PR PBB SHADOW E&M-EST. PATIENT-LVL III: ICD-10-PCS | Mod: PBBFAC,,, | Performed by: NURSE PRACTITIONER

## 2020-03-06 RX ORDER — TRAZODONE HYDROCHLORIDE 100 MG/1
TABLET ORAL
Qty: 90 TABLET | Refills: 3 | Status: SHIPPED | OUTPATIENT
Start: 2020-03-06 | End: 2020-05-22 | Stop reason: SDUPTHER

## 2020-03-06 RX ORDER — LISDEXAMFETAMINE DIMESYLATE 30 MG/1
30 CAPSULE ORAL EVERY MORNING
Qty: 30 CAPSULE | Refills: 0 | Status: SHIPPED | OUTPATIENT
Start: 2020-04-06 | End: 2020-03-06 | Stop reason: SDUPTHER

## 2020-03-06 RX ORDER — BUPROPION HYDROCHLORIDE 300 MG/1
300 TABLET ORAL DAILY
Qty: 90 TABLET | Refills: 3 | Status: SHIPPED | OUTPATIENT
Start: 2020-03-06 | End: 2020-05-22 | Stop reason: SDUPTHER

## 2020-03-06 RX ORDER — CITALOPRAM 40 MG/1
TABLET, FILM COATED ORAL
Qty: 90 TABLET | Refills: 3 | Status: SHIPPED | OUTPATIENT
Start: 2020-03-06 | End: 2020-05-22 | Stop reason: SDUPTHER

## 2020-03-06 RX ORDER — LISDEXAMFETAMINE DIMESYLATE 30 MG/1
30 CAPSULE ORAL EVERY MORNING
Qty: 30 CAPSULE | Refills: 0 | Status: SHIPPED | OUTPATIENT
Start: 2020-03-06 | End: 2020-03-06 | Stop reason: SDUPTHER

## 2020-03-06 RX ORDER — LORAZEPAM 1 MG/1
TABLET ORAL
Qty: 30 TABLET | Refills: 5 | Status: SHIPPED | OUTPATIENT
Start: 2020-03-06 | End: 2020-05-22 | Stop reason: SDUPTHER

## 2020-03-06 RX ORDER — LISDEXAMFETAMINE DIMESYLATE 30 MG/1
30 CAPSULE ORAL EVERY MORNING
Qty: 30 CAPSULE | Refills: 0 | Status: SHIPPED | OUTPATIENT
Start: 2020-05-06 | End: 2020-04-02 | Stop reason: SDUPTHER

## 2020-03-11 ENCOUNTER — OFFICE VISIT (OUTPATIENT)
Dept: SPORTS MEDICINE | Facility: CLINIC | Age: 35
End: 2020-03-11
Payer: COMMERCIAL

## 2020-03-11 ENCOUNTER — HOSPITAL ENCOUNTER (OUTPATIENT)
Dept: RADIOLOGY | Facility: HOSPITAL | Age: 35
Discharge: HOME | End: 2020-03-11
Attending: FAMILY MEDICINE
Payer: COMMERCIAL

## 2020-03-11 VITALS — TEMPERATURE: 98 F | HEIGHT: 76 IN | WEIGHT: 315 LBS | BODY MASS INDEX: 38.36 KG/M2

## 2020-03-11 DIAGNOSIS — M75.51 SUBACROMIAL BURSITIS OF RIGHT SHOULDER JOINT: ICD-10-CM

## 2020-03-11 DIAGNOSIS — M12.811 ROTATOR CUFF ARTHROPATHY OF RIGHT SHOULDER: ICD-10-CM

## 2020-03-11 DIAGNOSIS — M25.511 RIGHT SHOULDER PAIN, UNSPECIFIED CHRONICITY: ICD-10-CM

## 2020-03-11 DIAGNOSIS — M25.511 RIGHT SHOULDER PAIN, UNSPECIFIED CHRONICITY: Primary | ICD-10-CM

## 2020-03-11 PROCEDURE — 99204 OFFICE O/P NEW MOD 45 MIN: CPT | Mod: 25,S$GLB,, | Performed by: FAMILY MEDICINE

## 2020-03-11 PROCEDURE — 73030 X-RAY EXAM OF SHOULDER: CPT | Mod: 26,RT,, | Performed by: RADIOLOGY

## 2020-03-11 PROCEDURE — 73030 X-RAY EXAM OF SHOULDER: CPT | Mod: TC,RT

## 2020-03-11 PROCEDURE — 3008F BODY MASS INDEX DOCD: CPT | Mod: CPTII,S$GLB,, | Performed by: FAMILY MEDICINE

## 2020-03-11 PROCEDURE — 73030 XR SHOULDER COMPLETE 2 OR MORE VIEWS RIGHT: ICD-10-PCS | Mod: 26,RT,, | Performed by: RADIOLOGY

## 2020-03-11 PROCEDURE — 99999 PR PBB SHADOW E&M-EST. PATIENT-LVL III: ICD-10-PCS | Mod: PBBFAC,,, | Performed by: FAMILY MEDICINE

## 2020-03-11 PROCEDURE — 3008F PR BODY MASS INDEX (BMI) DOCUMENTED: ICD-10-PCS | Mod: CPTII,S$GLB,, | Performed by: FAMILY MEDICINE

## 2020-03-11 PROCEDURE — 99999 PR PBB SHADOW E&M-EST. PATIENT-LVL III: CPT | Mod: PBBFAC,,, | Performed by: FAMILY MEDICINE

## 2020-03-11 PROCEDURE — 20611 LARGE JOINT ASPIRATION/INJECTION: R SUBACROMIAL BURSA: ICD-10-PCS | Mod: RT,S$GLB,, | Performed by: FAMILY MEDICINE

## 2020-03-11 PROCEDURE — 20611 DRAIN/INJ JOINT/BURSA W/US: CPT | Mod: RT,S$GLB,, | Performed by: FAMILY MEDICINE

## 2020-03-11 PROCEDURE — 99204 PR OFFICE/OUTPT VISIT, NEW, LEVL IV, 45-59 MIN: ICD-10-PCS | Mod: 25,S$GLB,, | Performed by: FAMILY MEDICINE

## 2020-03-11 RX ORDER — TRIAMCINOLONE ACETONIDE 40 MG/ML
40 INJECTION, SUSPENSION INTRA-ARTICULAR; INTRAMUSCULAR
Status: DISCONTINUED | OUTPATIENT
Start: 2020-03-11 | End: 2020-03-11 | Stop reason: HOSPADM

## 2020-03-11 RX ADMIN — TRIAMCINOLONE ACETONIDE 40 MG: 40 INJECTION, SUSPENSION INTRA-ARTICULAR; INTRAMUSCULAR at 09:03

## 2020-03-11 NOTE — PROCEDURES
"Large Joint Aspiration/Injection: R subacromial bursa  Performed by: Mandeep Bowden MD  Authorized by: Mandeep Bowden MD  Date/Time: 3/11/2020 9:15 AM    Consent Done?:  Yes (Verbal)  Indications:  pain  Timeout: Immediately prior to procedure a time out was called to verify the correct patient, procedure, equipment, support staff and site/side marked as required.  Prep:Patient was prepped and draped in the usual sterile fashion.  Procedure site marked: Yes     Anesthesia  Local anesthesia not used        Details:   Needle size: 20 G  Ultrasonic Guidance for needle placement: Yes  Images are saved and documented.   Approach: posterior  Location:  Shoulder  Site:  R subacromial bursa    Medications: 40 mg triamcinolone acetonide 40 mg/mL  Patient tolerance:  patient tolerated the procedure well with no immediate complications    Description of ultrasound utilization for needle guidance:   Ultrasound guidance used for needle localization. Images saved and stored for documentation. The subacromial / subdeltoid bursa was visualized. Dynamic visualization of the 20g x 1.5" needle was continuous throughout the procedure.         "

## 2020-03-11 NOTE — PROGRESS NOTES
Ken Anderson, a 34 y.o. male, is here for evaluation of Right shoulder pain.     HISTORY OF PRESENT ILLNESS  Hand dominance, right    Location:  anterior and lateral, right  Onset:   acute, 03.04.2020  Palliative:     Relative rest   Activity modification   Oral analgesics  Provocative:    ADLs  GH abd/flx  Prior:  No hx of Orthopaedic surgery  Progression:  worsening discomfort  Quality:    sharp  Radiation:  none  Severity:  per nursing documentation  Timing:  intermittent with use  Trauma:  none specific - started crossfit about 6 mos ago    Review of systems (ROS):  A 10+ review of systems was performed with pertinent positives and negatives noted above in the history of present illness. Other systems were negative unless otherwise specified.      PHYSICAL EXAMINATION  General:  The patient is alert and oriented x 3. Mood is pleasant. Observation of ears, eyes and nose reveal no gross abnormalities. HEENT: NCAT, sclera anicteric. Lungs: Respirations are equal and unlabored.     RIGHT SHOULDER EXAMINATION     OBSERVATION:     Swelling  none  Deformity  none   Discoloration  none   Scapular winging none   Scars   none  Atrophy  none    TENDERNESS / CREPITUS (T/C):          T/C      T/C   Clavicle   -/-  SUPRAspinatus    -/-     AC Jt.    -/-  INFRAspinatus  -/-    SC Jt.    -/-  Deltoid    -/-      G. Tuberosity  -/-  LH BICEP groove  -/-   Acromion:  -/-  Midline Neck   -/-     Scapular Spine -/-  Trapezium   -/-   SMA Scapula  -/-  GH jt. line - post  -/-     Scapulothoracic  -/-         ROM:     Right shoulder   Left shoulder        AROM (PROM)   AROM (PROM)   FE    170° (175°)     170° (175°)     ER at 0°    60°  (65°)    60°  (65°)   ER at 90° ABD  90°  (90°)    90°  (90°)   IR at 90°  ABD   NA  (40°)     NA  (40°)      IR (spine level)   T10     T10    STRENGTH: (* = with pain) RIGHT SHOULDER  LEFT  SHOULDER   SCAPTION   5/5    5/5    IR    5/5    5/5   ER    5/5    5/5   BICEPS   5/5    5/5   Deltoid    5/5    5/5     SIGNS:  Painful side       NEER   -   OPATTS        -    HANSEN   -   SPEEDS        -   DROP ARM   -   BELLY PRESS       -    X-Body ADD    -   LIFT-OFF        -   HORNBLOWERS      -              STABILITY TESTING   RIGHT SHOULDER  LEFT SHOULDER     Translation     Anterior up face    up face    Posterior up face   up face    Sulcus  < 10mm   < 10 mm     Signs   Apprehension   neg     neg       Relocation   no change    no change      Jerk test  neg    neg    EXTREMITY NEURO-VASCULAR EXAM    Sensation grossly intact to light touch all dermatomal regions.    DTR 2+ Biceps, Triceps, BR and Negative Marlas sign   Grossly intact motor function at Elbow, Wrist and Hand   Distal pulses radial and ulnar 2+, brisk cap refill, symmetric.      NECK:  Painless FROM and spinous processes non-tender. Negative Spurlings sign.       Other Findings:    ASSESSMENT & PLAN   Assessment  #1 supraspinatus tendinosis, right /d    No evidence of osseous pathology  No evidence of neurologic pathology  No evidence of vascular pathology    Imaging studies reviewed:   X-ray shoulder, right 20.03    Plan  We discussed the importance of appropriate diet, weight, and regular exercise    We discussed options including    Watchful waiting / relative rest    Physical therapy x   Injection therapy csi sab r   Consultation    The patient chooses As above   x = prescribed  CSI = corticosteroid injection  VSI = viscosupplement injection  PRPI = platelet rich plasma injection  ia = intra articular  R = right  L = left  B = bilateral     Physical Therapy        Formal (fPT), @ [facility] facility b   Formal (fPT), @ [facility] facility        Homegoing (hgPT), per concurrent fPT recommendations    Homegoing (hgPT), per prior fPT recommendations    Homegoing (hgPT), handout provided        w/  (atPT)    [blank] = not  prescribed  x = prescribed  b = prescribed, and begin as indicated  t = continue as indicated  r = prescribed, and restart as indicated  p = completed prior as indicated  hs = prescribed, and with high school   col = prescribed, and with [facility] or university   nf = physical therapy was recommended, but patient is not interested in PT at this time    Activity (e.g. sports, work) restrictions    [blank] = as tolerated  pt = per physical therapist  at = per     Bracing    [blank] = not prescribed  r = recommended, but not fit with at todays visit  f = prescribed and fit with at todays visit  t = continue as indicated    Pain management Per pt nsaids c/i re: platelets   [blank] = No prescription necessary. A handout detailing dosing of appropriate   over-the-counter musculoskeletal analgesics was made available to the patient.   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

## 2020-03-13 ENCOUNTER — PATIENT MESSAGE (OUTPATIENT)
Dept: SPORTS MEDICINE | Facility: CLINIC | Age: 35
End: 2020-03-13

## 2020-03-16 ENCOUNTER — PATIENT MESSAGE (OUTPATIENT)
Dept: PSYCHIATRY | Facility: CLINIC | Age: 35
End: 2020-03-16

## 2020-04-02 ENCOUNTER — TELEPHONE (OUTPATIENT)
Dept: PSYCHIATRY | Facility: CLINIC | Age: 35
End: 2020-04-02

## 2020-04-02 DIAGNOSIS — F90.1 ATTENTION DEFICIT HYPERACTIVITY DISORDER (ADHD), PREDOMINANTLY HYPERACTIVE TYPE: ICD-10-CM

## 2020-04-02 RX ORDER — LISDEXAMFETAMINE DIMESYLATE 30 MG/1
30 CAPSULE ORAL EVERY MORNING
Qty: 30 CAPSULE | Refills: 0 | Status: SHIPPED | OUTPATIENT
Start: 2020-04-02 | End: 2020-05-22 | Stop reason: SDUPTHER

## 2020-04-02 NOTE — TELEPHONE ENCOUNTER
Order sent    ----- Message from Phoebe Bonilla sent at 4/1/2020 11:48 AM CDT -----  Contact: sunil Paredes  [phone]  Is it okay to refill 3 days earlier?      lisdexamfetamine (VYVANSE) 30 MG capsule

## 2020-04-03 ENCOUNTER — PATIENT MESSAGE (OUTPATIENT)
Dept: DERMATOLOGY | Facility: CLINIC | Age: 35
End: 2020-04-03

## 2020-04-03 DIAGNOSIS — L21.9 SEBORRHEIC DERMATITIS: ICD-10-CM

## 2020-04-03 RX ORDER — KETOCONAZOLE 20 MG/ML
SHAMPOO, SUSPENSION TOPICAL
Qty: 120 ML | Refills: 11 | Status: SHIPPED | OUTPATIENT
Start: 2020-04-03 | End: 2022-06-22

## 2020-04-03 RX ORDER — KETOCONAZOLE 20 MG/ML
SHAMPOO, SUSPENSION TOPICAL
Qty: 120 ML | Refills: 11 | OUTPATIENT
Start: 2020-04-03

## 2020-04-03 RX ORDER — KETOCONAZOLE 20 MG/G
CREAM TOPICAL
Qty: 60 G | Refills: 3 | Status: SHIPPED | OUTPATIENT
Start: 2020-04-03 | End: 2021-04-25

## 2020-05-20 DIAGNOSIS — I10 ESSENTIAL HYPERTENSION: ICD-10-CM

## 2020-05-21 RX ORDER — AMLODIPINE BESYLATE 10 MG/1
TABLET ORAL
Qty: 90 TABLET | Refills: 0 | Status: SHIPPED | OUTPATIENT
Start: 2020-05-21 | End: 2020-06-09

## 2020-05-22 ENCOUNTER — OFFICE VISIT (OUTPATIENT)
Dept: PSYCHIATRY | Facility: CLINIC | Age: 35
End: 2020-05-22
Payer: COMMERCIAL

## 2020-05-22 DIAGNOSIS — F33.1 MODERATE EPISODE OF RECURRENT MAJOR DEPRESSIVE DISORDER: ICD-10-CM

## 2020-05-22 DIAGNOSIS — F90.1 ATTENTION DEFICIT HYPERACTIVITY DISORDER (ADHD), PREDOMINANTLY HYPERACTIVE TYPE: ICD-10-CM

## 2020-05-22 DIAGNOSIS — G47.00 INSOMNIA DISORDER WITH NON-SLEEP DISORDER MENTAL COMORBIDITY: ICD-10-CM

## 2020-05-22 DIAGNOSIS — F41.0 PANIC DISORDER WITHOUT AGORAPHOBIA: ICD-10-CM

## 2020-05-22 DIAGNOSIS — F41.1 GENERALIZED ANXIETY DISORDER: ICD-10-CM

## 2020-05-22 PROCEDURE — 99213 OFFICE O/P EST LOW 20 MIN: CPT | Mod: 95,,, | Performed by: NURSE PRACTITIONER

## 2020-05-22 PROCEDURE — 99213 PR OFFICE/OUTPT VISIT, EST, LEVL III, 20-29 MIN: ICD-10-PCS | Mod: 95,,, | Performed by: NURSE PRACTITIONER

## 2020-05-22 RX ORDER — LORAZEPAM 1 MG/1
TABLET ORAL
Qty: 30 TABLET | Refills: 5 | Status: SHIPPED | OUTPATIENT
Start: 2020-05-22

## 2020-05-22 RX ORDER — CITALOPRAM 40 MG/1
TABLET, FILM COATED ORAL
Qty: 90 TABLET | Refills: 3 | Status: SHIPPED | OUTPATIENT
Start: 2020-05-22 | End: 2022-03-23

## 2020-05-22 RX ORDER — BUPROPION HYDROCHLORIDE 300 MG/1
300 TABLET ORAL DAILY
Qty: 90 TABLET | Refills: 3 | Status: SHIPPED | OUTPATIENT
Start: 2020-05-22 | End: 2021-07-06

## 2020-05-22 RX ORDER — TRAZODONE HYDROCHLORIDE 100 MG/1
TABLET ORAL
Qty: 90 TABLET | Refills: 3 | Status: SHIPPED | OUTPATIENT
Start: 2020-05-22 | End: 2020-12-01

## 2020-05-22 RX ORDER — LISDEXAMFETAMINE DIMESYLATE 30 MG/1
30 CAPSULE ORAL EVERY MORNING
Qty: 30 CAPSULE | Refills: 0 | Status: SHIPPED | OUTPATIENT
Start: 2020-06-22

## 2020-05-22 RX ORDER — LISDEXAMFETAMINE DIMESYLATE 30 MG/1
30 CAPSULE ORAL EVERY MORNING
Qty: 30 CAPSULE | Refills: 0 | Status: SHIPPED | OUTPATIENT
Start: 2020-07-22

## 2020-05-22 RX ORDER — LISDEXAMFETAMINE DIMESYLATE 30 MG/1
30 CAPSULE ORAL EVERY MORNING
Qty: 30 CAPSULE | Refills: 0 | Status: SHIPPED | OUTPATIENT
Start: 2020-05-22 | End: 2020-06-01 | Stop reason: SDUPTHER

## 2020-05-22 NOTE — PROGRESS NOTES
Outpatient Psychiatry Follow-Up Visit (MD/NP)    5/22/2020    Clinical Status of Patient:  Outpatient (Ambulatory)    Chief Complaint:  Ken Anderson is a 34 y.o. male who presents today for follow-up of depression, mood disorder, anxiety, attention problems and panic.  Met with patient      Last Visit: was on 3/06/2020 Chart and  reviewed  .   The patient location is: home  The chief complaint leading to consultation is: depression, anxiety, attention problems    Visit type: audiovisual    Face to Face time with patient: 18 minutes  25  minutes of total time spent on the encounter, which includes face to face time and non-face to face time preparing to see the patient (eg, review of tests), Obtaining and/or reviewing separately obtained history, Documenting clinical information in the electronic or other health record, Independently interpreting results (not separately reported) and communicating results to the patient/family/caregiver, or Care coordination (not separately reported).     Each patient to whom he or she provides medical services by telemedicine is:  (1) informed of the relationship between the physician and patient and the respective role of any other health care provider with respect to management of the patient; and (2) notified that he or she may decline to receive medical services by telemedicine and may withdraw from such care at any time.    Interval History and Content of Current Session:  Current Psych Medications  · Continue Celexa 40mg po q day  · Continue Ativan 1mg po q day prn  · Continue Vyvanse 30 mg po daily ( 3 months Rx printed)  · Continue Wellbutrin  mg po daily   · Continue Trazodone 100 mg po q hs PRN insomnia    Virtual Visit:  Reports flat affect but no other depression symptoms.  Started family therapy with parents today.  Also starting a new job at a different [employer] school.  Denies any other situational stressors. Denies SI/HI/AVH. Denies any other side  effects to medications.     Psychotherapy:  · Target symptoms: depression, distractability, anxiety   · Why chosen therapy is appropriate versus another modality: relevant to diagnosis  · Outcome monitoring methods: self-report  · Therapeutic intervention type: insight oriented psychotherapy  · Topics discussed/themes: symptom recognition  · The patient's response to the intervention is accepting. The patient's progress toward treatment goals is fair.   · Duration of intervention: 10 minutes.    Review of Systems     GENERAL: no weight change  SKIN: No rashes or lacerations   HEAD: No headaches   EYES: No exophthalmos, jaundice or blindness   EARS: No dizziness, tinnitus or hearing loss   NOSE: No changes in smell   MOUTH & THROAT: throat infection, currently on antibiotics.   CHEST: No shortness of breath, hyperventilation or cough   CARDIOVASCULAR: No tachycardia or chest pain   ABDOMEN: No nausea, vomiting, pain, constipation or diarrhea   URINARY: No frequency, dysuria or sexual dysfunction   ENDOCRINE: No polydipsia, polyuria   MUSCULOSKELETAL: No pain or stiffness of the joints   NEUROLOGIC: No weakness, sensory changes, seizures, confusion, memory loss, tremor or other abnormal movements      Psychiatric Review Of Systems:  sleep: yes  appetite changes: yes  weight changes: yes  energy/anergy: yes  interest/pleasure/anhedonia: yes  somatic symptoms: no  libido: yes  anxiety/panic: no    Past Medical, Family and Social History: The patient's past medical, family and social history have been reviewed and updated as appropriate within the electronic medical record - see encounter notes.    Compliance: yes    Side effects: see above    Risk Parameters:  Patient reports no suicidal ideation  Patient reports no homicidal ideation  Patient reports no self-injurious behavior  Patient reports no violent behavior    Exam (detailed: at least 9 elements; comprehensive: all 15 elements)   Constitutional  Vitals:  Most  recent vital signs, dated greater than 90 days prior to this appointment, were not reviewed.   There were no vitals filed for this visit.     General:  unremarkable, age appropriate, well dressed, neatly groomed     Musculoskeletal  Muscle Strength/Tone:  no dyskinesia, no dystonia, no tremor, no tic   Gait & Station:  non-ataxic     Psychiatric  Speech:  no latency; no press   Mood & Affect:  dysthymic  blunted   Thought Process:  normal and logical   Associations:  intact   Thought Content:  normal, no suicidality, no homicidality, delusions, or paranoia   Insight:  has awareness of illness   Judgement: behavior is adequate to circumstances   Orientation:  grossly intact   Memory: intact for content of interview   Language: grossly intact   Attention Span & Concentration:  able to focus   Fund of Knowledge:  intact and appropriate to age and level of education     Assessment and Diagnosis   Status/Progress: Based on the examination today, the patient's problem(s) is/are improved and adequately but not ideally controlled.  New problems have not been presented today.   Co-morbidities are not complicating management of the primary condition.  There are no active rule-out diagnoses for this patient at this time.     General Impression:       ICD-10-CM ICD-9-CM   1. Generalized anxiety disorder F41.1 300.02   2. Panic disorder without agoraphobia F41.0 300.01   3. Insomnia disorder with non-sleep disorder mental comorbidity G47.00 780.52   4. Moderate episode of recurrent major depressive disorder F33.1 296.32   5. Attention deficit hyperactivity disorder (ADHD), predominantly hyperactive type F90.1 314.01       Intervention/Counseling/Treatment Plan   · Medication Management: Continue current medications. The risks and benefits of medication were discussed with the patient.   · Continue Celexa 40mg po q day  · Continue Ativan 1mg po q day prn  · Continue Vyvanse 30 mg po daily ( 3 months Rx printed)  · Continue  Wellbutrin  mg po daily   · Continue Trazodone 100 mg po q hs PRN insomnia  · Continue individual psychotherapy with Debora Zamora LPC  · Reviewed Safety Plan and emergency contacts.     Return to Clinic: 3 months     Risks, benefits, side effects and alternative treatments discussed with patient. Patient agrees with the current plan as documented.  Encouraged Patient to keep future appointments.  Take medications as prescribed and abstain from substance abuse.  Pt to present to ED for thoughts to harm himself or others

## 2020-06-01 ENCOUNTER — TELEPHONE (OUTPATIENT)
Dept: PSYCHIATRY | Facility: CLINIC | Age: 35
End: 2020-06-01

## 2020-06-01 DIAGNOSIS — F90.1 ATTENTION DEFICIT HYPERACTIVITY DISORDER (ADHD), PREDOMINANTLY HYPERACTIVE TYPE: ICD-10-CM

## 2020-06-01 RX ORDER — LISDEXAMFETAMINE DIMESYLATE 30 MG/1
30 CAPSULE ORAL EVERY MORNING
Qty: 30 CAPSULE | Refills: 0 | Status: SHIPPED | OUTPATIENT
Start: 2020-06-01

## 2020-06-22 DIAGNOSIS — K13.0 ANGULAR CHEILITIS: ICD-10-CM

## 2020-06-23 ENCOUNTER — PATIENT MESSAGE (OUTPATIENT)
Dept: DERMATOLOGY | Facility: CLINIC | Age: 35
End: 2020-06-23

## 2020-06-24 ENCOUNTER — OFFICE VISIT (OUTPATIENT)
Dept: PODIATRY | Facility: CLINIC | Age: 35
End: 2020-06-24
Payer: COMMERCIAL

## 2020-06-24 ENCOUNTER — TELEPHONE (OUTPATIENT)
Dept: SURGERY | Facility: CLINIC | Age: 35
End: 2020-06-24

## 2020-06-24 ENCOUNTER — OFFICE VISIT (OUTPATIENT)
Dept: INTERNAL MEDICINE | Facility: CLINIC | Age: 35
End: 2020-06-24
Payer: COMMERCIAL

## 2020-06-24 ENCOUNTER — OFFICE VISIT (OUTPATIENT)
Dept: SURGERY | Facility: CLINIC | Age: 35
End: 2020-06-24
Payer: COMMERCIAL

## 2020-06-24 VITALS
WEIGHT: 315 LBS | BODY MASS INDEX: 38.36 KG/M2 | SYSTOLIC BLOOD PRESSURE: 132 MMHG | HEIGHT: 76 IN | OXYGEN SATURATION: 97 % | TEMPERATURE: 98 F | HEART RATE: 80 BPM | DIASTOLIC BLOOD PRESSURE: 70 MMHG

## 2020-06-24 VITALS
WEIGHT: 315 LBS | HEIGHT: 76 IN | DIASTOLIC BLOOD PRESSURE: 80 MMHG | SYSTOLIC BLOOD PRESSURE: 137 MMHG | BODY MASS INDEX: 38.36 KG/M2 | HEART RATE: 84 BPM

## 2020-06-24 DIAGNOSIS — M79.5 FOREIGN BODY (FB) IN SOFT TISSUE: ICD-10-CM

## 2020-06-24 DIAGNOSIS — S90.851A FOREIGN BODY IN RIGHT FOOT, INITIAL ENCOUNTER: ICD-10-CM

## 2020-06-24 DIAGNOSIS — S91.331A PUNCTURE WOUND OF RIGHT FOOT, INITIAL ENCOUNTER: Primary | ICD-10-CM

## 2020-06-24 DIAGNOSIS — M79.671 FOOT PAIN, RIGHT: ICD-10-CM

## 2020-06-24 DIAGNOSIS — K13.0 ANGULAR CHEILITIS: Primary | ICD-10-CM

## 2020-06-24 PROCEDURE — 3075F PR MOST RECENT SYSTOLIC BLOOD PRESS GE 130-139MM HG: ICD-10-PCS | Mod: CPTII,S$GLB,, | Performed by: PODIATRIST

## 2020-06-24 PROCEDURE — 3078F DIAST BP <80 MM HG: CPT | Mod: CPTII,S$GLB,, | Performed by: FAMILY MEDICINE

## 2020-06-24 PROCEDURE — 99999 PR PBB SHADOW E&M-EST. PATIENT-LVL V: ICD-10-PCS | Mod: PBBFAC,,, | Performed by: FAMILY MEDICINE

## 2020-06-24 PROCEDURE — 3075F SYST BP GE 130 - 139MM HG: CPT | Mod: CPTII,S$GLB,, | Performed by: PODIATRIST

## 2020-06-24 PROCEDURE — 3078F PR MOST RECENT DIASTOLIC BLOOD PRESSURE < 80 MM HG: ICD-10-PCS | Mod: CPTII,S$GLB,, | Performed by: FAMILY MEDICINE

## 2020-06-24 PROCEDURE — 3008F PR BODY MASS INDEX (BMI) DOCUMENTED: ICD-10-PCS | Mod: CPTII,S$GLB,, | Performed by: FAMILY MEDICINE

## 2020-06-24 PROCEDURE — 10120 INC&RMVL FB SUBQ TISS SMPL: CPT | Mod: RT,S$GLB,, | Performed by: PODIATRIST

## 2020-06-24 PROCEDURE — 99999 PR PBB SHADOW E&M-EST. PATIENT-LVL II: ICD-10-PCS | Mod: PBBFAC,,, | Performed by: SURGERY

## 2020-06-24 PROCEDURE — 99999 PR PBB SHADOW E&M-EST. PATIENT-LVL II: CPT | Mod: PBBFAC,,, | Performed by: SURGERY

## 2020-06-24 PROCEDURE — 99213 OFFICE O/P EST LOW 20 MIN: CPT | Mod: 25,S$GLB,, | Performed by: PODIATRIST

## 2020-06-24 PROCEDURE — 3008F BODY MASS INDEX DOCD: CPT | Mod: CPTII,S$GLB,, | Performed by: PODIATRIST

## 2020-06-24 PROCEDURE — 99024 POSTOP FOLLOW-UP VISIT: CPT | Mod: S$GLB,,, | Performed by: SURGERY

## 2020-06-24 PROCEDURE — 99999 PR PBB SHADOW E&M-EST. PATIENT-LVL III: ICD-10-PCS | Mod: PBBFAC,,, | Performed by: PODIATRIST

## 2020-06-24 PROCEDURE — 99213 OFFICE O/P EST LOW 20 MIN: CPT | Mod: S$GLB,,, | Performed by: FAMILY MEDICINE

## 2020-06-24 PROCEDURE — 99999 PR PBB SHADOW E&M-EST. PATIENT-LVL III: CPT | Mod: PBBFAC,,, | Performed by: PODIATRIST

## 2020-06-24 PROCEDURE — 10120 PR REMOVE FOREIGN BODY SIMPLE: ICD-10-PCS | Mod: RT,S$GLB,, | Performed by: PODIATRIST

## 2020-06-24 PROCEDURE — 3075F SYST BP GE 130 - 139MM HG: CPT | Mod: CPTII,S$GLB,, | Performed by: FAMILY MEDICINE

## 2020-06-24 PROCEDURE — 99213 PR OFFICE/OUTPT VISIT, EST, LEVL III, 20-29 MIN: ICD-10-PCS | Mod: S$GLB,,, | Performed by: FAMILY MEDICINE

## 2020-06-24 PROCEDURE — 3075F PR MOST RECENT SYSTOLIC BLOOD PRESS GE 130-139MM HG: ICD-10-PCS | Mod: CPTII,S$GLB,, | Performed by: FAMILY MEDICINE

## 2020-06-24 PROCEDURE — 3008F BODY MASS INDEX DOCD: CPT | Mod: CPTII,S$GLB,, | Performed by: FAMILY MEDICINE

## 2020-06-24 PROCEDURE — 99999 PR PBB SHADOW E&M-EST. PATIENT-LVL V: CPT | Mod: PBBFAC,,, | Performed by: FAMILY MEDICINE

## 2020-06-24 PROCEDURE — 3008F PR BODY MASS INDEX (BMI) DOCUMENTED: ICD-10-PCS | Mod: CPTII,S$GLB,, | Performed by: PODIATRIST

## 2020-06-24 PROCEDURE — 99024 PR POST-OP FOLLOW-UP VISIT: ICD-10-PCS | Mod: S$GLB,,, | Performed by: SURGERY

## 2020-06-24 PROCEDURE — 3079F PR MOST RECENT DIASTOLIC BLOOD PRESSURE 80-89 MM HG: ICD-10-PCS | Mod: CPTII,S$GLB,, | Performed by: PODIATRIST

## 2020-06-24 PROCEDURE — 99213 PR OFFICE/OUTPT VISIT, EST, LEVL III, 20-29 MIN: ICD-10-PCS | Mod: 25,S$GLB,, | Performed by: PODIATRIST

## 2020-06-24 PROCEDURE — 3079F DIAST BP 80-89 MM HG: CPT | Mod: CPTII,S$GLB,, | Performed by: PODIATRIST

## 2020-06-24 RX ORDER — NYSTATIN AND TRIAMCINOLONE ACETONIDE 100000; 1 [USP'U]/G; MG/G
OINTMENT TOPICAL
Qty: 30 G | Refills: 1 | OUTPATIENT
Start: 2020-06-24

## 2020-06-24 RX ORDER — NYSTATIN AND TRIAMCINOLONE ACETONIDE 100000; 1 [USP'U]/G; MG/G
OINTMENT TOPICAL
Qty: 30 G | Refills: 1 | Status: SHIPPED | OUTPATIENT
Start: 2020-06-24

## 2020-06-24 RX ORDER — LIDOCAINE HYDROCHLORIDE 20 MG/ML
JELLY TOPICAL
Qty: 30 ML | Refills: 2 | Status: SHIPPED | OUTPATIENT
Start: 2020-06-24 | End: 2020-12-01

## 2020-06-24 NOTE — PROGRESS NOTES
"[facility]  Formerly [facility] - Family Medicine/ Internal Medicine  Clinic Note      Subjective:       Patient ID: Ken Anderson is a 34 y.o. male.    Chief Complaint: Mouth Lesions and Glass in right foot    Patient is here today for an acute visit.  He notes recurrent onset of perioral rash which had prior responded well to topical antifungal.  The rash is uncomfortable and itchy, with some skin cracking.  No recent illness or fever.  He also notes he stepped on a shard of glass last night and was unable to remove all of the particles.  He has pain with ambulation and tenderness, along with some mild swelling about the area.  No purulent discharge or bleeding.  His tetanus is UTD.    Rash  This is a recurrent problem. The current episode started in the past 7 days. The problem has been waxing and waning since onset. The affected locations include the lips. The rash is characterized by itchiness, pain, redness, dryness and peeling. He was exposed to nothing. Pertinent negatives include no cough, diarrhea, fatigue, fever, shortness of breath or vomiting. Past treatments include nothing.     Review of Systems   Constitutional: Negative for fatigue and fever.   Respiratory: Negative for cough and shortness of breath.    Cardiovascular: Negative for chest pain and palpitations.   Gastrointestinal: Negative for abdominal pain, diarrhea, nausea and vomiting.   Skin: Positive for rash and wound.   Neurological: Negative for dizziness, syncope and headaches.       Objective:      /70 (BP Location: Left arm, Patient Position: Sitting, BP Method: Large (Manual))   Pulse 80   Temp 98 °F (36.7 °C) (Oral)   Ht 6' 4" (1.93 m)   Wt (!) 147.7 kg (325 lb 8.2 oz)   SpO2 97%   BMI 39.62 kg/m²   Physical Exam  Vitals signs reviewed.   Constitutional:       General: He is not in acute distress.     Appearance: He is well-developed.   Neck:      Musculoskeletal: Normal range of motion.   Cardiovascular:      " Rate and Rhythm: Normal rate and regular rhythm.      Heart sounds: No murmur.   Pulmonary:      Effort: Pulmonary effort is normal.      Breath sounds: Normal breath sounds. No wheezing or rales.   Abdominal:      General: Bowel sounds are normal.      Palpations: Abdomen is soft.   Musculoskeletal: Normal range of motion.   Skin:     General: Skin is warm and dry.      Findings: Rash (cracking and erythema at corners of mouth and along lip border, no purulence) present.      Comments: Focal swelling, induration, and tenderness on plantar surface of right foot, small laceration present, likely foreign body present but unable to fully visualize   Neurological:      Mental Status: He is alert and oriented to person, place, and time.         Assessment:       1. Angular cheilitis    2. Foreign body (FB) in soft tissue        Plan:     1. Angular cheilitis  - history and exam findings reviewed, recurrent rash, will refill topical antifungal and dosing instructions reviewed  - - nystatin-triamcinolone (MYCOLOG) ointment; AAA at corner of mouth BID and continue use for 2 days after resolution of rash  Dispense: 30 g; Refill: 1  - questions answered, warning signs reviewed, patient is comfortable with this plan and was encouraged to call the office for any concerns or worsening of condition     2. Foreign body (FB) in soft tissue  - Ambulatory referral/consult to General Surgery; Future    - Follow up if symptoms worsen or fail to improve.     Petros Abad MD  6/24/2020          Medication List with Changes/Refills   Current Medications    ACETAMINOPHEN (TYLENOL) 325 MG TABLET    Take 325 mg by mouth every 6 (six) hours as needed for Pain.    ALBUTEROL (PROVENTIL/VENTOLIN HFA) 90 MCG/ACTUATION INHALER    Inhale 2 puffs into the lungs every 6 (six) hours as needed for Wheezing. Rescue    AMLODIPINE (NORVASC) 10 MG TABLET    TAKE 1 TABLET BY MOUTH EVERY DAY    BUPROPION (WELLBUTRIN XL) 300 MG 24 HR TABLET    Take 1  tablet (300 mg total) by mouth once daily.    CALCIUM CITRATE/VITAMIN D3 (CITRACAL REGULAR ORAL)    Take by mouth.    CITALOPRAM (CELEXA) 40 MG TABLET    TAKE 1 TABLET(40 MG) BY MOUTH EVERY DAY    ELIQUIS 5 MG TAB    TK 1 T PO BID AS DIRECTED    INV SODIUM CHLORIDE 0.9 % SOLP 90 ML WITH INV ECULIZUMAB/PLACEBO 300 MG/30 ML SOLN 900 MG    Inject 900 mg into the vein once. FOR INVESTIGATIONAL USE ONLY    KETOCONAZOLE (NIZORAL) 2 % CREAM    AAA bid    KETOCONAZOLE (NIZORAL) 2 % SHAMPOO    Wash hair and beard 3x/week    LISDEXAMFETAMINE (VYVANSE) 30 MG CAPSULE    Take 1 capsule (30 mg total) by mouth every morning.    LISDEXAMFETAMINE (VYVANSE) 30 MG CAPSULE    Take 1 capsule (30 mg total) by mouth every morning.    LISDEXAMFETAMINE (VYVANSE) 30 MG CAPSULE    Take 1 capsule (30 mg total) by mouth every morning.    LORAZEPAM (ATIVAN) 1 MG TABLET    TAKE 1 TABLET(1 MG) BY MOUTH DAILY AS NEEDED FOR ANXIETY    RAVULIZUMAB-CWVZ (ULTOMIRIS) 300 MG/30 ML (10 MG/ML) SOLN    Inject into the vein.    TRAZODONE (DESYREL) 100 MG TABLET    TAKE 1/2 TO 1 TABLET BY MOUTH AT BEDTIME FOR INSOMNIA    TRETINOIN (RETIN-A) 0.025 % CREAM    Apply small amount to entire face qhs. Wash off qam and apply sunscreen.    VALACYCLOVIR (VALTREX) 1000 MG TABLET    TAKE 2 TABLETS BY MOUTH TWICE DAILY FOR 1 DAY; BEGIN AT FIRST SIGN OF OUTBREAK   Changed and/or Refilled Medications    Modified Medication Previous Medication    NYSTATIN-TRIAMCINOLONE (MYCOLOG) OINTMENT nystatin-triamcinolone (MYCOLOG) ointment       AAA at corner of mouth BID and continue use for 2 days after resolution of rash    AAA at corner of mouth BID   Discontinued Medications    AZITHROMYCIN (Z-EDITA) 250 MG TABLET    Take 2 tablets by mouth on day 1; Take 1 tablet by mouth on days 2-5    FEXOFENADINE (ALLEGRA) 30 MG TABLET    Take 30 mg by mouth as needed.     FLUARIX QUAD 5174-0124, PF, 60 MCG (15 MCG X 4)/0.5 ML VACCINE

## 2020-06-24 NOTE — LETTER
June 25, 2020      Petros Abad MD  [address] [place] [address]  [place] [address]           [facility]  [address], [address]  [facility] [address]  Phone: [phone]          Patient: Ken Anderson   MR Number: [id number]   YOB: 1985   Date of Visit: 6/24/2020       Dear Dr. Petros Abad:    Thank you for referring Ken Anderson to me for evaluation. Attached you will find relevant portions of my assessment and plan of care.    If you have questions, please do not hesitate to call me. I look forward to following Ken Anderson along with you.    Sincerely,    Shorty Camacho MD    Enclosure  CC:  No Recipients    If you would like to receive this communication electronically, please contact [email or web address] or [phone] to request more information on [employer] Link access.    For providers and/or their staff who would like to refer a patient to [facility], please contact us through our one-stop-shop provider referral line, [facility], at [phone].    If you feel you have received this communication in error or would no longer like to receive these types of communications, please e-mail [email or web address]

## 2020-06-24 NOTE — PROGRESS NOTES
Subjective:      Patient ID: Ken Anderson is a 34 y.o. male.    Chief Complaint: Foot Pain (5/10 when walking)    Sharp deep pain right forefoot.  Rapid onset last night.  No change since.  , aggravated by increased weight bearing, shoe gear, pressure.  No previous medical treatment.  OTC pain med not helping.     Review of Systems   Constitution: Negative for chills, diaphoresis, fever, malaise/fatigue and night sweats.   Cardiovascular: Negative for claudication, cyanosis, leg swelling and syncope.   Skin: Positive for poor wound healing and suspicious lesions. Negative for color change, dry skin, nail changes, rash and unusual hair distribution.   Musculoskeletal: Negative for falls, joint pain, joint swelling, muscle cramps, muscle weakness and stiffness.   Gastrointestinal: Negative for constipation, diarrhea, nausea and vomiting.   Neurological: Negative for brief paralysis, disturbances in coordination, focal weakness, numbness, paresthesias, sensory change and tremors.           Objective:      Physical Exam  Constitutional:       General: He is not in acute distress.     Appearance: He is well-developed. He is not diaphoretic.   Cardiovascular:      Pulses:           Popliteal pulses are 2+ on the right side and 2+ on the left side.        Dorsalis pedis pulses are 2+ on the right side and 2+ on the left side.        Posterior tibial pulses are 2+ on the right side and 2+ on the left side.      Comments: Capillary refill 3 seconds all toes/distal feet, all toes/both feet warm to touch.      Negative lymphadenopathy bilateral popliteal fossa and tarsal tunnel.      Negavie lower extremity edema bilateral.    Musculoskeletal:      Right ankle: He exhibits normal range of motion, no swelling, no ecchymosis, no deformity, no laceration and normal pulse. Achilles tendon normal. Achilles tendon exhibits no pain, no defect and normal Becker's test results.      Comments: Normal angle, base, station of  gait. All ten toes without clubbing, cyanosis, or signs of ischemia.  No pain to palpation bilateral lower extremities.  Range of motion, stability, muscle strength, and muscle tone normal bilateral feet and legs.    Lymphadenopathy:      Lower Body: No right inguinal adenopathy. No left inguinal adenopathy.      Comments: Negative lymphadenopathy bilateral popliteal fossa and tarsal tunnel.    Negative lymphangitic streaking bilateral feet/ankles/legs.   Skin:     General: Skin is warm and dry.      Capillary Refill: Capillary refill takes 2 to 3 seconds.      Coloration: Skin is not pale.      Findings: No abrasion, bruising, burn, ecchymosis, erythema, laceration, lesion or rash.      Nails: There is no clubbing.        Comments:   [id number] puncture wound plantar right forefoot  without ulceration, drainage, pus, tracking, fluctuance, malodor, or cardinal signs infection.    Otherwise, Skin is normal age and health appropriate color, turgor, texture, and temperature bilateral lower extremities without ulceration, hyperpigmentation, discoloration, masses nodules or cords palpated.  No ecchymosis, erythema, edema, or cardinal signs of infection bilateral lower extremities.     Neurological:      Mental Status: He is alert and oriented to person, place, and time.      Sensory: No sensory deficit.      Motor: No tremor, atrophy or abnormal muscle tone.      Gait: Gait normal.      Deep Tendon Reflexes:      Reflex Scores:       Patellar reflexes are 2+ on the right side and 2+ on the left side.       Achilles reflexes are 2+ on the right side and 2+ on the left side.     Comments: Negative tinel sign to percussion sural, superficial peroneal, deep peroneal, saphenous, and posterior tibial nerves right and left ankles and feet.    Negative allodynia both feet.   Psychiatric:         Behavior: Behavior is cooperative.               Assessment:       Encounter Diagnoses   Name Primary?    Puncture wound of right foot,  initial encounter Yes    Foot pain, right     Foreign body in right foot, initial encounter          Plan:       Ken was seen today for foot pain.    Diagnoses and all orders for this visit:    Puncture wound of right foot, initial encounter    Foot pain, right    Foreign body in right foot, initial encounter    Other orders  -     lidocaine HCL 2% (XYLOCAINE) 2 % jelly; Apply topically as needed. Apply topically once nightly to affected part of foot/feet.      I counseled the patient on his conditions, their implications and medical management.        Remove foreign body (glass) right forefoot (subcutaneous).    Rx lidocaine.    Inspect feet multiple times daily for signs of occurrence/recurrence ulceration/infection.    Surgeon: Danny Casillas DPM  Assistant: None  Pre-op Dx: foreign body plantar right forefoot  Post-op Dx: same  Procedure: Excision of superficial foreign body right plantar forefoot  Pathology: none  Anesthesia: declines.   Hemostasis: none  EBL: < 1 mL  Materials: none  Injectables: per above  Complications: none    With the patient's verbal consent the skin was prepped with alcohol. Local infiltration of local anesthetic above was completed. A sterile #15 scalpel was used to excise overlying skin, callus until a piece of glass was visualized. The scalpel and hemostat were used to grasp the glass and pull it out.  Flushed with NS. Applied triple antibiotic ointment, bordered gauze to wound site. Home care instructions reviewed in detail.    Denis dickson declined    RTC 2 weeks for wound check or prn.            Follow up if symptoms worsen or fail to improve.

## 2020-06-24 NOTE — TELEPHONE ENCOUNTER
06/24/2020                 916  Attempted to contact patient regarding his appointment for today. No answer. Left voicemail.

## 2020-07-28 ENCOUNTER — OFFICE VISIT (OUTPATIENT)
Dept: PODIATRY | Facility: CLINIC | Age: 35
End: 2020-07-28
Payer: COMMERCIAL

## 2020-07-28 VITALS
HEIGHT: 76 IN | HEART RATE: 74 BPM | TEMPERATURE: 98 F | WEIGHT: 315 LBS | DIASTOLIC BLOOD PRESSURE: 84 MMHG | BODY MASS INDEX: 38.36 KG/M2 | SYSTOLIC BLOOD PRESSURE: 129 MMHG

## 2020-07-28 DIAGNOSIS — S90.851A FOREIGN BODY IN RIGHT FOOT, INITIAL ENCOUNTER: Primary | ICD-10-CM

## 2020-07-28 DIAGNOSIS — D84.9 IMMUNOCOMPROMISED: ICD-10-CM

## 2020-07-28 PROCEDURE — 10120 PR REMOVE FOREIGN BODY SIMPLE: ICD-10-PCS | Mod: RT,S$GLB,, | Performed by: PODIATRIST

## 2020-07-28 PROCEDURE — 3008F PR BODY MASS INDEX (BMI) DOCUMENTED: ICD-10-PCS | Mod: CPTII,S$GLB,, | Performed by: PODIATRIST

## 2020-07-28 PROCEDURE — 3079F PR MOST RECENT DIASTOLIC BLOOD PRESSURE 80-89 MM HG: ICD-10-PCS | Mod: CPTII,S$GLB,, | Performed by: PODIATRIST

## 2020-07-28 PROCEDURE — 99999 PR PBB SHADOW E&M-EST. PATIENT-LVL V: ICD-10-PCS | Mod: PBBFAC,,, | Performed by: PODIATRIST

## 2020-07-28 PROCEDURE — 99213 OFFICE O/P EST LOW 20 MIN: CPT | Mod: 25,S$GLB,, | Performed by: PODIATRIST

## 2020-07-28 PROCEDURE — 99213 PR OFFICE/OUTPT VISIT, EST, LEVL III, 20-29 MIN: ICD-10-PCS | Mod: 25,S$GLB,, | Performed by: PODIATRIST

## 2020-07-28 PROCEDURE — 3074F SYST BP LT 130 MM HG: CPT | Mod: CPTII,S$GLB,, | Performed by: PODIATRIST

## 2020-07-28 PROCEDURE — 3074F PR MOST RECENT SYSTOLIC BLOOD PRESSURE < 130 MM HG: ICD-10-PCS | Mod: CPTII,S$GLB,, | Performed by: PODIATRIST

## 2020-07-28 PROCEDURE — 99999 PR PBB SHADOW E&M-EST. PATIENT-LVL V: CPT | Mod: PBBFAC,,, | Performed by: PODIATRIST

## 2020-07-28 PROCEDURE — 3079F DIAST BP 80-89 MM HG: CPT | Mod: CPTII,S$GLB,, | Performed by: PODIATRIST

## 2020-07-28 PROCEDURE — 10120 INC&RMVL FB SUBQ TISS SMPL: CPT | Mod: RT,S$GLB,, | Performed by: PODIATRIST

## 2020-07-28 PROCEDURE — 3008F BODY MASS INDEX DOCD: CPT | Mod: CPTII,S$GLB,, | Performed by: PODIATRIST

## 2020-07-28 NOTE — PROGRESS NOTES
Subjective:      Patient ID: Ken Anderson is a 34 y.o. male.    Chief Complaint:   Foot Problem (right ft. stepped in glass, dry to get it out, thinks is not all out) and Foot Pain    Ken is a 34 y.o. male who presents to the podiatry clinic  with complaint of  right foot pain. Onset of the symptoms was yesterday. Precipitating event: stepped on glass this am. Current symptoms include: ability to bear weight, but with some pain. Aggravating factors: any weight bearing. Symptoms have progressed to a point and plateaued. Patient has had prior foot problems. Dr. Casillas got a different piece of glass out of his foot.  Evaluation to date: tried to get it out himself. Treatment to date: none. Patients rates pain 4/10 on pain scale.    Pt relates he stepping on a salsa jar piece of glass after it broke this am.   Pt relates it is painful... he could not get it out. Pt relates he is immunocompromised    He works in the [employer]    Recently got a pool apartment     Past Medical History:   Diagnosis Date    ADHD (attention deficit hyperactivity disorder)     Adjustment disorder with mixed emotional features 6/29/2012    Anxiety state, unspecified 1/7/2013    Aplastic anemia     aplastic anemia    Blood transfusion     Depression     Essential hypertension 10/12/2015    Generalized anxiety disorder 6/29/2012    GERD (gastroesophageal reflux disease)     History of eye injury 13YRS    finger stuck ?eye    Major depressive disorder, recurrent episode, mild 6/29/2012    Panic disorder without agoraphobia 6/29/2012    Tobacco abuse disorder 5/10/2017     Past Surgical History:   Procedure Laterality Date    BONE MARROW BIOPSY      COLONOSCOPY      ESOPHAGOGASTRODUODENOSCOPY       Current Outpatient Medications on File Prior to Visit   Medication Sig Dispense Refill    acetaminophen (TYLENOL) 325 MG tablet Take 325 mg by mouth every 6 (six) hours as needed for Pain.      albuterol  (PROVENTIL/VENTOLIN HFA) 90 mcg/actuation inhaler Inhale 2 puffs into the lungs every 6 (six) hours as needed for Wheezing. Rescue 18 g 0    amLODIPine (NORVASC) 10 MG tablet TAKE 1 TABLET BY MOUTH EVERY DAY 90 tablet 0    buPROPion (WELLBUTRIN XL) 300 MG 24 hr tablet Take 1 tablet (300 mg total) by mouth once daily. 90 tablet 3    CALCIUM CITRATE/VITAMIN D3 (CITRACAL REGULAR ORAL) Take by mouth.      citalopram (CELEXA) 40 MG tablet TAKE 1 TABLET(40 MG) BY MOUTH EVERY DAY 90 tablet 3    ELIQUIS 5 mg Tab TK 1 T PO BID AS DIRECTED  3    INV sodium chloride 0.9 % SolP 90 mL with INV eculizumab/placebo 300 mg/30 mL Soln 900 mg Inject 900 mg into the vein once. FOR INVESTIGATIONAL USE ONLY      ketoconazole (NIZORAL) 2 % cream AAA bid 60 g 3    ketoconazole (NIZORAL) 2 % shampoo Wash hair and beard 3x/week 120 mL 11    lidocaine HCL 2% (XYLOCAINE) 2 % jelly Apply topically as needed. Apply topically once nightly to affected part of foot/feet. 30 mL 2    lisdexamfetamine (VYVANSE) 30 MG capsule Take 1 capsule (30 mg total) by mouth every morning. 30 capsule 0    lisdexamfetamine (VYVANSE) 30 MG capsule Take 1 capsule (30 mg total) by mouth every morning. 30 capsule 0    lisdexamfetamine (VYVANSE) 30 MG capsule Take 1 capsule (30 mg total) by mouth every morning. 30 capsule 0    LORazepam (ATIVAN) 1 MG tablet TAKE 1 TABLET(1 MG) BY MOUTH DAILY AS NEEDED FOR ANXIETY 30 tablet 5    nystatin-triamcinolone (MYCOLOG) ointment AAA at corner of mouth BID and continue use for 2 days after resolution of rash 30 g 1    ravulizumab-cwvz (ULTOMIRIS) 300 mg/30 mL (10 mg/mL) Soln Inject into the vein.      traZODone (DESYREL) 100 MG tablet TAKE 1/2 TO 1 TABLET BY MOUTH AT BEDTIME FOR INSOMNIA 90 tablet 3    tretinoin (RETIN-A) 0.025 % cream Apply small amount to entire face qhs. Wash off qam and apply sunscreen. 45 g 3    valACYclovir (VALTREX) 1000 MG tablet TAKE 2 TABLETS BY MOUTH TWICE DAILY FOR 1 DAY; BEGIN AT  "FIRST SIGN OF OUTBREAK 12 tablet 0    [DISCONTINUED] ARIPiprazole (ABILIFY) 5 MG Tab TAKE 1 TABLET(5 MG) BY MOUTH EVERY DAY 30 tablet 0    [DISCONTINUED] lamoTRIgine (LAMICTAL) 25 MG tablet Take 1 tablet by mouth daily for 14 days then increase to 2 tablet daily 60 tablet 2     No current facility-administered medications on file prior to visit.      Review of patient's allergies indicates:   Allergen Reactions    No known drug allergies        Review of Systems   Constitution: Negative for chills, decreased appetite, fever, malaise/fatigue, night sweats, weight gain and weight loss.   Cardiovascular: Negative for chest pain, claudication, dyspnea on exertion, leg swelling, palpitations and syncope.   Respiratory: Negative for cough and shortness of breath.    Endocrine: Negative for cold intolerance and heat intolerance.   Hematologic/Lymphatic: Negative for bleeding problem. Does not bruise/bleed easily.   Skin: Negative for color change, dry skin, flushing, itching, nail changes, poor wound healing, rash, skin cancer, suspicious lesions and unusual hair distribution.   Musculoskeletal: Negative for arthritis, back pain, falls, gout, joint pain, joint swelling, muscle cramps, muscle weakness, myalgias, neck pain and stiffness.   Gastrointestinal: Negative for diarrhea, nausea and vomiting.   Neurological: Negative for dizziness, focal weakness, light-headedness, numbness, paresthesias, tremors, vertigo and weakness.   Psychiatric/Behavioral: Negative for altered mental status and depression. The patient does not have insomnia.    Allergic/Immunologic: Negative.            Objective:       Vitals:    07/28/20 1017   BP: 129/84   Pulse: 74   Temp: 97.5 °F (36.4 °C)   Weight: (!) 146.1 kg (322 lb 1.5 oz)   Height: 6' 4" (1.93 m)   PainSc:   6   PainLoc: Foot   (!) 146.1 kg (322 lb 1.5 oz)   Flip flops  Physical Exam  Vitals signs and nursing note reviewed.   Constitutional:       General: He is not in acute " distress.     Appearance: He is well-developed. He is not ill-appearing, toxic-appearing or diaphoretic.   Cardiovascular:      Pulses:           Dorsalis pedis pulses are 2+ on the right side and 2+ on the left side.        Posterior tibial pulses are 2+ on the right side and 2+ on the left side.   Musculoskeletal: Normal range of motion.         General: No tenderness or deformity.      Right foot: Normal range of motion. No deformity.      Left foot: Normal range of motion. No deformity.      Comments: + pop to right plantar foot sub4th MTPJ/glass    Pain improved once f.body was removed   Feet:      Right foot:      Protective Sensation: 10 sites tested. 10 sites sensed.      Skin integrity: Callus and dry skin present. No ulcer, blister, skin breakdown, erythema or warmth.      Toenail Condition: Right toenails are normal.      Left foot:      Protective Sensation: 10 sites tested. 10 sites sensed.      Skin integrity: No ulcer, blister, skin breakdown, erythema, warmth, callus or dry skin.      Toenail Condition: Left toenails are normal.      Comments: Poor hygeine to feet    + removed foreign body from subright 4th MTPJ consistent with glass.. measures 0.5cm x 0.3xm x 0.1cm    No soi.   Skin:     General: Skin is warm.      Capillary Refill: Capillary refill takes 2 to 3 seconds.      Coloration: Skin is not pale.      Findings: No erythema or rash.      Nails: There is no clubbing.     Neurological:      Mental Status: He is alert and oriented to person, place, and time.      Gait: Gait abnormal.   Psychiatric:         Speech: Speech normal.         Behavior: Behavior normal.         Thought Content: Thought content normal.         Judgment: Judgment normal.                   Assessment:       Encounter Diagnoses   Name Primary?    Foreign body in right foot, initial encounter Yes    Immunocompromised          Plan:       Ken was seen today for foot problem and foot pain.    Diagnoses and all orders  for this visit:    Foreign body in right foot, initial encounter    Immunocompromised      I counseled the patient on his conditions, their implications and medical management.      - no indication for xray or culture at this time    With the patient's verbal consent the skin was prepped with alcohol. A sterile #15 scalpel was used to excise hyperkeratotic skin, non-viable skin to level of SQ also freeing and removing a piece of glass. Fibrous tissue in wound base was excised to SQ. Blood loss minimal and controlled with pressure.he  tolerated the procedure well without complication. Flushed wound site with NS and applied antibiotic ointment and dsd.       Pt tolerated well.     D/w pt wound care and to avoid walking barefoot.

## 2020-08-10 ENCOUNTER — OFFICE VISIT (OUTPATIENT)
Dept: NEPHROLOGY | Facility: CLINIC | Age: 35
End: 2020-08-10
Payer: COMMERCIAL

## 2020-08-10 DIAGNOSIS — I10 ESSENTIAL HYPERTENSION: Primary | ICD-10-CM

## 2020-08-10 PROCEDURE — 99203 OFFICE O/P NEW LOW 30 MIN: CPT | Mod: 95,,, | Performed by: INTERNAL MEDICINE

## 2020-08-10 PROCEDURE — 99203 PR OFFICE/OUTPT VISIT, NEW, LEVL III, 30-44 MIN: ICD-10-PCS | Mod: 95,,, | Performed by: INTERNAL MEDICINE

## 2020-08-10 NOTE — PROGRESS NOTES
Subjective:       Patient ID: Ken Anderson is a 35 y.o. White male who presents for new patient evaluation for chronic renal failure.    Ken Anderson is referred by Larisa Cooley MD to be evaluated for chronic renal failure.  I had seen him in 2015 prior to coming to the [facility].  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  He follows with a Hematologist at [facility].  He has no uremic or urinary symptoms and is in his usual state of health.  There have been no recent illnesses, hospitalizations or procedures.  His blood pressure is usually less than 130/80 but he states he sometimes gets into the 130s/80s in the afternoon.  This also tends to spike when he has anxiety.   He is now off of CYA.  He is trying to lose weight as well.      Review of Systems   Constitutional: Negative for appetite change, chills and fever.   HENT: Negative for congestion.    Eyes: Negative for visual disturbance.   Respiratory: Negative for cough and shortness of breath.    Cardiovascular: Negative for chest pain and leg swelling.   Gastrointestinal: Negative for abdominal pain, diarrhea, nausea and vomiting.   Genitourinary: Negative for difficulty urinating, dysuria and hematuria.   Musculoskeletal: Negative for myalgias.   Skin: Negative for rash.   Neurological: Negative for headaches.   Psychiatric/Behavioral: Negative for sleep disturbance. The patient is nervous/anxious.        The past medical, family and social histories were reviewed for this encounter.     Past Medical History:   Diagnosis Date    ADHD (attention deficit hyperactivity disorder)     Adjustment disorder with mixed emotional features 6/29/2012    Anxiety state, unspecified 1/7/2013    Aplastic anemia     aplastic anemia    Blood transfusion     Depression     Essential hypertension 10/12/2015    Generalized anxiety disorder 6/29/2012    GERD (gastroesophageal reflux disease)     History of eye injury 13YRS    finger  stuck ?eye    Major depressive disorder, recurrent episode, mild 2012    Panic disorder without agoraphobia 2012    Tobacco abuse disorder 5/10/2017     Past Surgical History:   Procedure Laterality Date    BONE MARROW BIOPSY      COLONOSCOPY      ESOPHAGOGASTRODUODENOSCOPY       Social History     Socioeconomic History    Marital status: Single     Spouse name: Not on file    Number of children: Not on file    Years of education: Not on file    Highest education level: Not on file   Occupational History     Employer: [employer]   Social Needs    Financial resource strain: Not on file    Food insecurity     Worry: Not on file     Inability: Not on file    Transportation needs     Medical: Not on file     Non-medical: Not on file   Tobacco Use    Smoking status: Former Smoker     Packs/day: 0.30     Years: 7.00     Pack years: 2.10     Types: Cigarettes     Quit date: 2018     Years since quittin.9    Smokeless tobacco: Current User   Substance and Sexual Activity    Alcohol use: Yes     Alcohol/week: 4.0 standard drinks     Types: 4 Standard drinks or equivalent per week     Comment: 4 week    Drug use: No    Sexual activity: Yes   Lifestyle    Physical activity     Days per week: Not on file     Minutes per session: Not on file    Stress: Not on file   Relationships    Social connections     Talks on phone: Not on file     Gets together: Not on file     Attends Temple service: Not on file     Active member of club or organization: Not on file     Attends meetings of clubs or organizations: Not on file     Relationship status: Not on file   Other Topics Concern    Caffeine Use Yes    Financial Status: Disabled Not Asked    Legal: Involved in criminal litigation No    Caffeine Use: Frequent Not Asked    Financial Status: Employed Yes    Legal: Other Not Asked    Caffeine Use: Moderate Not Asked    Financial Status: Unemployed Not Asked    Leisure:  Exercise Not Asked    Caffeine Use: Substantial Not Asked    Financial Status: Other Not Asked    Leisure: Fishing Not Asked    Childhood History: Adopted Not Asked    Firearms: Does patient have access to a firearm? Not Asked    Leisure: Hunting Not Asked    Childhood History: Early trauma Not Asked    Home situation: homeless Not Asked    Leisure: Movie Watching Yes    Childhood History: Raised by parents Yes    Home situation: lives alone Yes    Leisure: Shopping Not Asked    Childhood History: Uneventful Not Asked    Home situation: lives in group home Not Asked    Leisure: Sports Not Asked    Childhood History: Other Yes    Home situation: lives in nursing home Not Asked    Leisure: Time with family Not Asked    Education: Unfinished High School Not Asked    Home situation: lives in shelter Not Asked     Service Not Asked    Education: High School Graduate Not Asked    Home situation: lives with family Not Asked    Spirituality: Active Participation Not Asked    Education: Unfinished college Not Asked    Home situation: lives with friends Not Asked    Spirituality: Organized Rastafarian Not Asked    Education: Trade School Not Asked    Home situation: lives with significant other Not Asked    Spirituality: Private Participation Not Asked    Education: Associate's Degree Not Asked    Home situation: lives with spouse Not Asked    Patient feels they ought to cut down on drinking/drug use Not Asked    Education: Bachelor's Degree Not Asked    Legal consequences of chemical use Not Asked    Patient annoyed by others criticizing their drinking/drug use Not Asked    Education: More than one Bachelor's or Professional Yes    Legal: Arrest history Not Asked    Patient has felt bad or guilty about drinking/drug use Not Asked    Education: Master's, PhD Not Asked    Legal: Involved in civil litigation Not Asked    Patient has had a drink/used drugs as an eye opener in the AM  No   Social History Narrative    Not on file     Current Outpatient Medications   Medication Sig    acetaminophen (TYLENOL) 325 MG tablet Take 325 mg by mouth every 6 (six) hours as needed for Pain.    albuterol (PROVENTIL/VENTOLIN HFA) 90 mcg/actuation inhaler Inhale 2 puffs into the lungs every 6 (six) hours as needed for Wheezing. Rescue    amLODIPine (NORVASC) 10 MG tablet TAKE 1 TABLET BY MOUTH EVERY DAY    buPROPion (WELLBUTRIN XL) 300 MG 24 hr tablet Take 1 tablet (300 mg total) by mouth once daily.    CALCIUM CITRATE/VITAMIN D3 (CITRACAL REGULAR ORAL) Take by mouth.    citalopram (CELEXA) 40 MG tablet TAKE 1 TABLET(40 MG) BY MOUTH EVERY DAY    ELIQUIS 5 mg Tab TK 1 T PO BID AS DIRECTED    INV sodium chloride 0.9 % SolP 90 mL with INV eculizumab/placebo 300 mg/30 mL Soln 900 mg Inject 900 mg into the vein once. FOR INVESTIGATIONAL USE ONLY    ketoconazole (NIZORAL) 2 % cream AAA bid    ketoconazole (NIZORAL) 2 % shampoo Wash hair and beard 3x/week    lidocaine HCL 2% (XYLOCAINE) 2 % jelly Apply topically as needed. Apply topically once nightly to affected part of foot/feet.    lisdexamfetamine (VYVANSE) 30 MG capsule Take 1 capsule (30 mg total) by mouth every morning.    lisdexamfetamine (VYVANSE) 30 MG capsule Take 1 capsule (30 mg total) by mouth every morning.    lisdexamfetamine (VYVANSE) 30 MG capsule Take 1 capsule (30 mg total) by mouth every morning.    LORazepam (ATIVAN) 1 MG tablet TAKE 1 TABLET(1 MG) BY MOUTH DAILY AS NEEDED FOR ANXIETY    nystatin-triamcinolone (MYCOLOG) ointment AAA at corner of mouth BID and continue use for 2 days after resolution of rash    ravulizumab-cwvz (ULTOMIRIS) 300 mg/30 mL (10 mg/mL) Soln Inject into the vein.    traZODone (DESYREL) 100 MG tablet TAKE 1/2 TO 1 TABLET BY MOUTH AT BEDTIME FOR INSOMNIA    tretinoin (RETIN-A) 0.025 % cream Apply small amount to entire face qhs. Wash off qam and apply sunscreen.    valACYclovir (VALTREX) 1000 MG  tablet TAKE 2 TABLETS BY MOUTH TWICE DAILY FOR 1 DAY; BEGIN AT FIRST SIGN OF OUTBREAK     No current facility-administered medications for this visit.        There were no vitals taken for this visit.    Objective:      Physical Exam  Vitals signs reviewed.   Constitutional:       General: He is not in acute distress.     Appearance: He is well-developed.   HENT:      Head: Normocephalic and atraumatic.   Eyes:      General: No scleral icterus.  Pulmonary:      Effort: Pulmonary effort is normal. No respiratory distress.   Neurological:      Mental Status: He is alert and oriented to person, place, and time.   Psychiatric:         Mood and Affect: Mood normal.         Behavior: Behavior normal.         Assessment:       1. Essential hypertension        Plan:   Return to clinic in 1 year.  Baseline creatinine is 1.0.  Continue current medications as prescribed and reviewed.   Blood pressure is controlled on the current regimen.  We discussed making lifestyle changes and using a Mediterranean diet for health benefits and weight loss.  If her were to need another agent, we could use either HCTZ or and ARB since he has been on these agents in the past.  I do believe he is better off continuing to lose weight which will improved his blood pressure control.

## 2020-08-17 ENCOUNTER — OFFICE VISIT (OUTPATIENT)
Dept: UROLOGY | Facility: CLINIC | Age: 35
End: 2020-08-17
Attending: UROLOGY
Payer: COMMERCIAL

## 2020-08-17 VITALS
BODY MASS INDEX: 38.36 KG/M2 | WEIGHT: 315 LBS | DIASTOLIC BLOOD PRESSURE: 95 MMHG | HEIGHT: 76 IN | HEART RATE: 86 BPM | SYSTOLIC BLOOD PRESSURE: 146 MMHG

## 2020-08-17 DIAGNOSIS — N52.9 ERECTILE DYSFUNCTION, UNSPECIFIED ERECTILE DYSFUNCTION TYPE: Primary | ICD-10-CM

## 2020-08-17 PROCEDURE — 3077F PR MOST RECENT SYSTOLIC BLOOD PRESSURE >= 140 MM HG: ICD-10-PCS | Mod: CPTII,S$GLB,, | Performed by: UROLOGY

## 2020-08-17 PROCEDURE — 3008F PR BODY MASS INDEX (BMI) DOCUMENTED: ICD-10-PCS | Mod: CPTII,S$GLB,, | Performed by: UROLOGY

## 2020-08-17 PROCEDURE — 3080F DIAST BP >= 90 MM HG: CPT | Mod: CPTII,S$GLB,, | Performed by: UROLOGY

## 2020-08-17 PROCEDURE — 99203 PR OFFICE/OUTPT VISIT, NEW, LEVL III, 30-44 MIN: ICD-10-PCS | Mod: S$GLB,,, | Performed by: UROLOGY

## 2020-08-17 PROCEDURE — 99203 OFFICE O/P NEW LOW 30 MIN: CPT | Mod: S$GLB,,, | Performed by: UROLOGY

## 2020-08-17 PROCEDURE — 3080F PR MOST RECENT DIASTOLIC BLOOD PRESSURE >= 90 MM HG: ICD-10-PCS | Mod: CPTII,S$GLB,, | Performed by: UROLOGY

## 2020-08-17 PROCEDURE — 3008F BODY MASS INDEX DOCD: CPT | Mod: CPTII,S$GLB,, | Performed by: UROLOGY

## 2020-08-17 PROCEDURE — 3077F SYST BP >= 140 MM HG: CPT | Mod: CPTII,S$GLB,, | Performed by: UROLOGY

## 2020-08-17 RX ORDER — TADALAFIL 20 MG/1
20 TABLET ORAL DAILY PRN
Qty: 30 TABLET | Refills: 11 | Status: SHIPPED | OUTPATIENT
Start: 2020-08-17 | End: 2021-02-17 | Stop reason: SDUPTHER

## 2020-08-17 NOTE — PROGRESS NOTES
"Subjective:      Ken Anderson is a 35 y.o. male who was self-referred for evaluation of Erectile dysfunction.      Erectile Dysfunction  Patient complains of erectile dysfunction. Onset of dysfunction was 18 months ago and was gradual in onset.  Patient states the nature of difficulty is both attaining and maintaining erection. Full erections occur with intercourse and with masturbation. Partial erections occur with intercourse and with masturbation. Libido is not affected. Risk factors for ED include history of PNH and history of anxiety. Patient denies history of cardiovascular disease, diabetes mellitus, cranial, spinal, or pelvic trauma, pelvic radiation and beta blocker use. Patient's expectations as to sexual function full erections for penetrative intercourse. Previous treatment of ED includes none.    He takes eliquis for DVT prevention due to his PNH.    He is interested in starting Cialis    The following portions of the patient's history were reviewed and updated as appropriate: allergies, current medications, past family history, past medical history, past social history, past surgical history and problem list.    Review of Systems  Constitutional: no fever or chills  ENT: no nasal congestion or sore throat  Respiratory: no cough or shortness of breath  Cardiovascular: no chest pain or palpitations  Gastrointestinal: no nausea or vomiting, tolerating diet  Genitourinary: as per HPI  Hematologic/Lymphatic: no easy bruising or lymphadenopathy  Musculoskeletal: no arthralgias or myalgias  Neurological: no seizures or tremors  Behavioral/Psych: no auditory or visual hallucinations     Objective:   Vitals: BP (!) 146/95 (BP Location: Left arm, Patient Position: Sitting, BP Method: Large (Automatic))   Pulse 86   Ht 6' 4" (1.93 m)   Wt (!) 146.1 kg (322 lb 1.5 oz)   BMI 39.21 kg/m²     Physical Exam   General: alert and oriented, no acute distress  Head: normocephalic, atraumatic  Neck: supple, no " lymphadenopathy, normal ROM, no masses  Respiratory: Symmetric expansion, non-labored breathing  Cardiovascular: regular rate and rhythm, nomal pulses, no peripheral edema  Abdomen: soft, non tender, non distended, no palpable masses, no hernias, no hepatomegaly or splenomegaly  Genitourinary:   Penis: normal, no lesions, patent orthotopic meatus, no plaques  Scrotum: no rashes or skin changes;   Testes: descended bilaterally, no masses, nontender, normal epididymides bilaterally, no hydroceles  Lymphatic: no inguinal nodes  Skin: normal coloration and turgor, no rashes, no suspicious skin lesions noted  Neuro: alert and oriented x3, no gross deficits  Psych: normal judgment and insight, normal mood/affect and non-anxious    Lab Review   Urinalysis demonstrates not collected  Lab Results   Component Value Date    WBC 4.01 01/02/2018    HGB 12.1 (L) 01/02/2018    HCT 34.4 (L) 01/02/2018    MCV 99 (H) 01/02/2018    PLT 62 (L) 01/02/2018     Lab Results   Component Value Date    CREATININE 1.0 12/19/2018    BUN 13 12/19/2018     No results found for: PSA    Assessment:     1. Erectile dysfunction, unspecified erectile dysfunction type        Plan:   1. Will start Cialis 20mg, risks and benefits reviewed  2. Follow up in 6 months

## 2020-08-18 ENCOUNTER — OFFICE VISIT (OUTPATIENT)
Dept: OTOLARYNGOLOGY | Facility: CLINIC | Age: 35
End: 2020-08-18
Payer: COMMERCIAL

## 2020-08-18 VITALS
WEIGHT: 315 LBS | BODY MASS INDEX: 38.36 KG/M2 | HEIGHT: 76 IN | DIASTOLIC BLOOD PRESSURE: 89 MMHG | TEMPERATURE: 98 F | HEART RATE: 96 BPM | SYSTOLIC BLOOD PRESSURE: 138 MMHG

## 2020-08-18 DIAGNOSIS — J34.2 NASAL SEPTAL DEVIATION: ICD-10-CM

## 2020-08-18 DIAGNOSIS — G47.33 OBSTRUCTIVE SLEEP APNEA TREATED WITH CONTINUOUS POSITIVE AIRWAY PRESSURE (CPAP): ICD-10-CM

## 2020-08-18 DIAGNOSIS — D84.9 IMMUNOSUPPRESSION: ICD-10-CM

## 2020-08-18 DIAGNOSIS — D61.9 APLASTIC ANEMIA: ICD-10-CM

## 2020-08-18 DIAGNOSIS — J30.9 ALLERGIC RHINITIS, UNSPECIFIED SEASONALITY, UNSPECIFIED TRIGGER: ICD-10-CM

## 2020-08-18 DIAGNOSIS — H69.93 DYSFUNCTION OF BOTH EUSTACHIAN TUBES: ICD-10-CM

## 2020-08-18 DIAGNOSIS — H61.23 BILATERAL IMPACTED CERUMEN: Primary | ICD-10-CM

## 2020-08-18 PROCEDURE — 69210 REMOVE IMPACTED EAR WAX UNI: CPT | Mod: S$GLB,,, | Performed by: SPECIALIST

## 2020-08-18 PROCEDURE — 99204 OFFICE O/P NEW MOD 45 MIN: CPT | Mod: 25,S$GLB,, | Performed by: SPECIALIST

## 2020-08-18 PROCEDURE — 99204 PR OFFICE/OUTPT VISIT, NEW, LEVL IV, 45-59 MIN: ICD-10-PCS | Mod: 25,S$GLB,, | Performed by: SPECIALIST

## 2020-08-18 PROCEDURE — 3075F SYST BP GE 130 - 139MM HG: CPT | Mod: CPTII,S$GLB,, | Performed by: SPECIALIST

## 2020-08-18 PROCEDURE — 3079F DIAST BP 80-89 MM HG: CPT | Mod: CPTII,S$GLB,, | Performed by: SPECIALIST

## 2020-08-18 PROCEDURE — 69210 EAR CERUMEN REMOVAL: ICD-10-PCS | Mod: S$GLB,,, | Performed by: SPECIALIST

## 2020-08-18 PROCEDURE — 3008F BODY MASS INDEX DOCD: CPT | Mod: CPTII,S$GLB,, | Performed by: SPECIALIST

## 2020-08-18 PROCEDURE — 3075F PR MOST RECENT SYSTOLIC BLOOD PRESS GE 130-139MM HG: ICD-10-PCS | Mod: CPTII,S$GLB,, | Performed by: SPECIALIST

## 2020-08-18 PROCEDURE — 3079F PR MOST RECENT DIASTOLIC BLOOD PRESSURE 80-89 MM HG: ICD-10-PCS | Mod: CPTII,S$GLB,, | Performed by: SPECIALIST

## 2020-08-18 PROCEDURE — 3008F PR BODY MASS INDEX (BMI) DOCUMENTED: ICD-10-PCS | Mod: CPTII,S$GLB,, | Performed by: SPECIALIST

## 2020-08-18 RX ORDER — AZELASTINE 1 MG/ML
SPRAY, METERED NASAL
Qty: 30 ML | Refills: 11 | Status: SHIPPED | OUTPATIENT
Start: 2020-08-18 | End: 2021-05-14

## 2020-08-18 RX ORDER — FLUTICASONE PROPIONATE 50 MCG
SPRAY, SUSPENSION (ML) NASAL
Qty: 18.2 ML | Refills: 11 | Status: SHIPPED | OUTPATIENT
Start: 2020-08-18 | End: 2021-05-14

## 2020-08-18 NOTE — PROCEDURES
"Ear Cerumen Removal    Date/Time: 8/18/2020 5:30 PM  Performed by: SHEFALI Oro MD  Authorized by: SHEFALI Oro MD     Time out: Immediately prior to procedure a "time out" was called to verify the correct patient, procedure, equipment, support staff and site/side marked as required.    Consent Done?:  Yes (Verbal)    Local anesthetic:  None  Location details:  Both ears  Procedure type comment:  Wire loop  Cerumen  Removal Results:  Cerumen completely removed  Patient tolerance:  Patient tolerated the procedure well with no immediate complications      "

## 2020-08-18 NOTE — PROGRESS NOTES
Subjective:       Patient ID: Ken Anderson is a 35 y.o. male.    Chief Complaint: Ear Fullness (with Ear cleaning)    The patient is complaining of blockage in both ears.  He has a history of recurring problems with ear wax.  He is also having some allergy/sinus problems.  His symptoms primarily are related to congestion and postnasal drip.  He uses Allegra and Flonase and saline irrigations to control that.  Nasal secretions are clear.  He is not having fever.    The patient has obstructive sleep apnea and uses CPAP on a regular basis.    Review of Systems   Constitutional: Negative for activity change, appetite change, chills, fatigue, fever and unexpected weight change.   HENT: Positive for nasal congestion, hearing loss, postnasal drip and sinus pressure/congestion. Negative for drooling, ear discharge, ear pain (Blockage of both ears), mouth sores, rhinorrhea, sneezing, sore throat, tinnitus, trouble swallowing and voice change.         Obstructive sleep apnea-using CPAP   Eyes: Negative for photophobia, pain, discharge, redness, itching and visual disturbance.   Respiratory: Negative for apnea, cough, choking, shortness of breath and wheezing.    Cardiovascular: Negative for chest pain and palpitations.   Gastrointestinal: Negative for abdominal distention, abdominal pain, nausea and vomiting.   Musculoskeletal: Negative for arthralgias, myalgias, neck pain and neck stiffness.   Integumentary:  Negative for color change, pallor and rash. Negative.   Allergic/Immunologic: Positive for environmental allergies. Negative for food allergies.   Neurological: Positive for headaches. Negative for dizziness, seizures, facial asymmetry, speech difficulty, weakness, light-headedness and numbness.   Hematological: Negative for adenopathy. Does not bruise/bleed easily.        Aplastic anemia   Psychiatric/Behavioral: Negative for agitation, confusion, decreased concentration and sleep disturbance.          Objective:      Physical Exam  Vitals signs reviewed.   Constitutional:       Appearance: He is well-developed.   HENT:      Head: Normocephalic.      Jaw: There is normal jaw occlusion.      Salivary Glands: Right salivary gland is not diffusely enlarged. Left salivary gland is not diffusely enlarged.      Right Ear: Ear canal and external ear normal. There is impacted cerumen. Tympanic membrane is retracted. Tympanic membrane has decreased mobility.      Left Ear: Ear canal and external ear normal. There is impacted cerumen. Tympanic membrane is retracted. Tympanic membrane has decreased mobility.      Nose: Septal deviation (To the right), mucosal edema (cyanotic, boggy inferior turbinates bilaterally) and rhinorrhea (clear mucus bilaterally) present.      Right Turbinates: Enlarged and pale.      Left Turbinates: Enlarged and pale.      Mouth/Throat:      Lips: Pink. No lesions.      Mouth: Mucous membranes are moist. No oral lesions.      Dentition: No gum lesions.      Tongue: No lesions. Tongue does not deviate from midline.      Palate: No mass and lesions.      Pharynx: Uvula midline.   Eyes:      General: Lids are normal.         Right eye: No discharge.         Left eye: No discharge.      Conjunctiva/sclera:      Right eye: Right conjunctiva is injected. No exudate.     Left eye: Left conjunctiva is injected. No exudate.     Pupils: Pupils are equal, round, and reactive to light.   Neck:      Musculoskeletal: Normal range of motion. No muscular tenderness.      Thyroid: No thyroid mass or thyromegaly.      Trachea: Trachea normal. No tracheal deviation.   Cardiovascular:      Rate and Rhythm: Normal rate and regular rhythm.      Pulses: Normal pulses.      Heart sounds: Normal heart sounds.   Pulmonary:      Effort: Pulmonary effort is normal.      Breath sounds: Normal breath sounds. No stridor. No decreased breath sounds, wheezing, rhonchi or rales.   Abdominal:      General: Bowel sounds are normal.       Palpations: Abdomen is soft.      Tenderness: There is no abdominal tenderness.   Musculoskeletal: Normal range of motion.   Lymphadenopathy:      Head:      Right side of head: No submental, submandibular, preauricular, posterior auricular or occipital adenopathy.      Left side of head: No submental, submandibular, preauricular, posterior auricular or occipital adenopathy.      Cervical: No cervical adenopathy.   Skin:     General: Skin is warm and dry.      Findings: No petechiae or rash.      Nails: There is no clubbing.     Neurological:      Mental Status: He is alert and oriented to person, place, and time.      Cranial Nerves: No cranial nerve deficit.      Sensory: No sensory deficit.      Gait: Gait normal.   Psychiatric:         Speech: Speech normal.         Behavior: Behavior normal. Behavior is cooperative.         Thought Content: Thought content normal.         Judgment: Judgment normal.         Procedure-cerumen impaction is removed from both ear canals using a wire loop.  The patient tolerated procedure well was discharged post procedure.    Assessment:       1. Bilateral impacted cerumen    2. Allergic rhinitis, unspecified seasonality, unspecified trigger    3. Nasal septal deviation    4. Dysfunction of both eustachian tubes    5. Obstructive sleep apnea treated with continuous positive airway pressure (CPAP)        Plan:       I will have the patient apply several drops of Chung and Chung baby oil to both ears at night followed by placement of cotton ball once weekly.  This should help to him also father wax in control is wax issue.  I am starting him on a combination of Astelin and Flonase to be used twice daily in both nostrils and will recheck him in 6 weeks.  He will continue using saline nasal irrigations on an as-needed basis.

## 2020-08-21 ENCOUNTER — LAB VISIT (OUTPATIENT)
Dept: LAB | Facility: HOSPITAL | Age: 35
End: 2020-08-21
Attending: INTERNAL MEDICINE
Payer: COMMERCIAL

## 2020-08-21 ENCOUNTER — OFFICE VISIT (OUTPATIENT)
Dept: INTERNAL MEDICINE | Facility: CLINIC | Age: 35
End: 2020-08-21
Payer: COMMERCIAL

## 2020-08-21 DIAGNOSIS — Z02.0 SCHOOL PHYSICAL EXAM: ICD-10-CM

## 2020-08-21 DIAGNOSIS — Z02.0 SCHOOL PHYSICAL EXAM: Primary | ICD-10-CM

## 2020-08-21 DIAGNOSIS — Z01.84 IMMUNITY STATUS TESTING: ICD-10-CM

## 2020-08-21 PROCEDURE — 99213 OFFICE O/P EST LOW 20 MIN: CPT | Mod: 95,,, | Performed by: FAMILY MEDICINE

## 2020-08-21 PROCEDURE — 99213 PR OFFICE/OUTPT VISIT, EST, LEVL III, 20-29 MIN: ICD-10-PCS | Mod: 95,,, | Performed by: FAMILY MEDICINE

## 2020-08-21 PROCEDURE — 86480 TB TEST CELL IMMUN MEASURE: CPT

## 2020-08-21 NOTE — PROGRESS NOTES
Subjective:      Patient ID: Ken Anderson is a 35 y.o. male.    Chief Complaint: No chief complaint on file.      HPI:  Ken Anderson is a 35 year old male who presents     The patient location is: [place]  The chief complaint leading to consultation is: vaccination status and form completion    Visit type: audiovisual    Face to Face time with patient: 5 minutes  15 minutes of total time spent on the encounter, which includes face to face time and non-face to face time preparing to see the patient (eg, review of tests), Obtaining and/or reviewing separately obtained history, Documenting clinical information in the electronic or other health record, Independently interpreting results (not separately reported) and communicating results to the patient/family/caregiver, or Care coordination (not separately reported).     Each patient to whom he or she provides medical services by telemedicine is:  (1) informed of the relationship between the physician and patient and the respective role of any other health care provider with respect to management of the patient; and (2) notified that he or she may decline to receive medical services by telemedicine and may withdraw from such care at any time.    Started [facility] program at [facility].  Needs titers for MMR, varicella, Hep B and Quantiferon gold.  No new complaints today.      Past Medical History:   Diagnosis Date    ADHD (attention deficit hyperactivity disorder)     Adjustment disorder with mixed emotional features 6/29/2012    Anxiety state, unspecified 1/7/2013    Aplastic anemia     aplastic anemia    Blood transfusion     Depression     Essential hypertension 10/12/2015    Generalized anxiety disorder 6/29/2012    GERD (gastroesophageal reflux disease)     History of eye injury 13YRS    finger stuck ?eye    Major depressive disorder, recurrent episode, mild 6/29/2012    Panic disorder without agoraphobia 6/29/2012    Tobacco abuse disorder 5/10/2017        Past Surgical History:   Procedure Laterality Date    BONE MARROW BIOPSY      COLONOSCOPY      ESOPHAGOGASTRODUODENOSCOPY         Family History   Problem Relation Age of Onset    Cancer Father         skin    Skin cancer Father     Emphysema Maternal Grandmother     Prostate cancer Maternal Grandfather     Dementia Paternal Grandmother     Cancer Paternal Grandfather         liver    No Known Problems Mother     No Known Problems Sister     No Known Problems Sister     No Known Problems Brother     No Known Problems Maternal Aunt     No Known Problems Maternal Uncle     No Known Problems Paternal Aunt     No Known Problems Paternal Uncle     Melanoma Neg Hx     Psoriasis Neg Hx     Lupus Neg Hx     Eczema Neg Hx     Amblyopia Neg Hx     Blindness Neg Hx     Cataracts Neg Hx     Diabetes Neg Hx     Glaucoma Neg Hx     Hypertension Neg Hx     Macular degeneration Neg Hx     Retinal detachment Neg Hx     Strabismus Neg Hx     Stroke Neg Hx     Thyroid disease Neg Hx        Social History     Socioeconomic History    Marital status: Single     Spouse name: Not on file    Number of children: Not on file    Years of education: Not on file    Highest education level: Not on file   Occupational History     Employer: [employer]   Social Needs    Financial resource strain: Not on file    Food insecurity     Worry: Not on file     Inability: Not on file    Transportation needs     Medical: Not on file     Non-medical: Not on file   Tobacco Use    Smoking status: Former Smoker     Packs/day: 0.30     Years: 7.00     Pack years: 2.10     Types: Cigarettes     Quit date: 2018     Years since quittin.9    Smokeless tobacco: Current User   Substance and Sexual Activity    Alcohol use: Yes     Alcohol/week: 4.0 standard drinks     Types: 4 Standard drinks or equivalent per week     Comment: 4 week    Drug use: No    Sexual activity: Yes   Lifestyle     Physical activity     Days per week: Not on file     Minutes per session: Not on file    Stress: Not on file   Relationships    Social connections     Talks on phone: Not on file     Gets together: Not on file     Attends [employer] service: Not on file     Active member of club or organization: Not on file     Attends meetings of clubs or organizations: Not on file     Relationship status: Not on file   Other Topics Concern    Caffeine Use Yes    Financial Status: Disabled Not Asked    Legal: Involved in criminal litigation No    Caffeine Use: Frequent Not Asked    Financial Status: Employed Yes    Legal: Other Not Asked    Caffeine Use: Moderate Not Asked    Financial Status: Unemployed Not Asked    Leisure: Exercise Not Asked    Caffeine Use: Substantial Not Asked    Financial Status: Other Not Asked    Leisure: Fishing Not Asked    Childhood History: Adopted Not Asked    Firearms: Does patient have access to a firearm? Not Asked    Leisure: Hunting Not Asked    Childhood History: Early trauma Not Asked    Home situation: homeless Not Asked    Leisure: Movie Watching Yes    Childhood History: Raised by parents Yes    Home situation: lives alone Yes    Leisure: Shopping Not Asked    Childhood History: Uneventful Not Asked    Home situation: lives in group home Not Asked    Leisure: Sports Not Asked    Childhood History: Other Yes    Home situation: lives in nursing home Not Asked    Leisure: Time with family Not Asked    Education: Unfinished High School Not Asked    Home situation: lives in shelter Not Asked     Service Not Asked    Education: High School Graduate Not Asked    Home situation: lives with family Not Asked    Spirituality: Active Participation Not Asked    Education: Unfinished college Not Asked    Home situation: lives with friends Not Asked    Spirituality: Organized Druze Not Asked    Education: Trade School Not Asked    Home situation: lives with  significant other Not Asked    Spirituality: Private Participation Not Asked    Education: Associate's Degree Not Asked    Home situation: lives with spouse Not Asked    Patient feels they ought to cut down on drinking/drug use Not Asked    Education: Bachelor's Degree Not Asked    Legal consequences of chemical use Not Asked    Patient annoyed by others criticizing their drinking/drug use Not Asked    Education: More than one Bachelor's or Professional Yes    Legal: Arrest history Not Asked    Patient has felt bad or guilty about drinking/drug use Not Asked    Education: Master's, PhD Not Asked    Legal: Involved in civil litigation Not Asked    Patient has had a drink/used drugs as an eye opener in the AM No   Social History Narrative    Not on file       Review of Systems   Constitutional: Negative for activity change and unexpected weight change.   HENT: Negative for hearing loss, rhinorrhea and trouble swallowing.    Eyes: Negative for discharge and visual disturbance.   Respiratory: Negative for chest tightness and wheezing.    Cardiovascular: Negative for chest pain and palpitations.   Gastrointestinal: Negative for constipation, diarrhea and vomiting.   Endocrine: Negative for polydipsia and polyuria.   Genitourinary: Negative for difficulty urinating, hematuria and urgency.   Musculoskeletal: Negative for arthralgias, joint swelling and neck pain.   Neurological: Negative for weakness and headaches.   Psychiatric/Behavioral: Negative for confusion and dysphoric mood.     Objective:   There were no vitals filed for this visit.    Physical Exam  Constitutional:       General: He is not in acute distress.  HENT:      Head: Normocephalic and atraumatic.   Neurological:      Mental Status: He is alert.   Psychiatric:         Mood and Affect: Mood normal.         Thought Content: Thought content normal.         Judgment: Judgment normal.        Assessment:      1. School physical exam    2. Immunity  status testing      Plan:   Diagnoses and all orders for this visit:    School physical exam  -     Rubella antibody, IgG; Future  -     Rubeola antibody IgG; Future  -     Mumps, IgG Screen; Future  -     Hepatitis B Surface Antibody, Qual/Quant; Future  -     Varicella zoster antibody, IgG; Future  -     QUANTIFERON GOLD TB; Future    Immunity status testing  -     Rubella antibody, IgG; Future  -     Rubeola antibody IgG; Future  -     Mumps, IgG Screen; Future  -     Hepatitis B Surface Antibody, Qual/Quant; Future  -     Varicella zoster antibody, IgG; Future  -     QUANTIFERON GOLD TB; Future    Labs ordered; will complete form once labs returned.

## 2020-08-25 LAB
GAMMA INTERFERON BACKGROUND BLD IA-ACNC: 0.01 IU/ML
M TB IFN-G CD4+ BCKGRND COR BLD-ACNC: 0 IU/ML
MITOGEN IGNF BCKGRD COR BLD-ACNC: 7.86 IU/ML
TB GOLD PLUS: NEGATIVE
TB2 - NIL: 0 IU/ML

## 2020-09-01 ENCOUNTER — DOCUMENTATION ONLY (OUTPATIENT)
Dept: DERMATOLOGY | Facility: CLINIC | Age: 35
End: 2020-09-01

## 2020-09-28 ENCOUNTER — PATIENT OUTREACH (OUTPATIENT)
Dept: ADMINISTRATIVE | Facility: OTHER | Age: 35
End: 2020-09-28

## 2020-09-28 NOTE — PROGRESS NOTES
Health Maintenance Due   Topic Date Due    Influenza Vaccine (1) 08/01/2020     Updates were requested from care everywhere.  Chart was reviewed for overdue Proactive [facility] Encounters (BERENICE) topics (CRS, Breast Cancer Screening, Eye exam)  Health Maintenance has been updated.  LINKS immunization registry triggered.  Immunizations were reconciled.

## 2020-09-30 ENCOUNTER — OFFICE VISIT (OUTPATIENT)
Dept: DERMATOLOGY | Facility: CLINIC | Age: 35
End: 2020-09-30
Payer: COMMERCIAL

## 2020-09-30 DIAGNOSIS — L98.8 RHYTIDES: ICD-10-CM

## 2020-09-30 DIAGNOSIS — Z12.83 SKIN CANCER SCREENING: ICD-10-CM

## 2020-09-30 DIAGNOSIS — D22.9 MULTIPLE BENIGN NEVI: Primary | ICD-10-CM

## 2020-09-30 DIAGNOSIS — L72.0 EIC (EPIDERMAL INCLUSION CYST): ICD-10-CM

## 2020-09-30 PROCEDURE — 99213 OFFICE O/P EST LOW 20 MIN: CPT | Mod: S$GLB,,, | Performed by: DERMATOLOGY

## 2020-09-30 PROCEDURE — 99999 PR PBB SHADOW E&M-EST. PATIENT-LVL II: ICD-10-PCS | Mod: PBBFAC,,, | Performed by: DERMATOLOGY

## 2020-09-30 PROCEDURE — 99213 PR OFFICE/OUTPT VISIT, EST, LEVL III, 20-29 MIN: ICD-10-PCS | Mod: S$GLB,,, | Performed by: DERMATOLOGY

## 2020-09-30 PROCEDURE — 99999 PR PBB SHADOW E&M-EST. PATIENT-LVL II: CPT | Mod: PBBFAC,,, | Performed by: DERMATOLOGY

## 2020-09-30 NOTE — PROGRESS NOTES
Subjective:       Patient ID:  Ken Anderson is a 35 y.o. male who presents for   Chief Complaint   Patient presents with    Skin Check     HPI    Pt presents today for UBSE, has some moles he would like looked at today.    Pt would like retinol strength to be increased if possible.    Pt has paroxysmal nocturnal hemoglobinuria and aplastic anemia and is on a new medication for it x 2 wks (gets infusions at [facility]).    No personal history of skin cancer or atypical moles.    Review of Systems   Constitutional: Negative for fever, chills, weight loss, weight gain, fatigue, night sweats and malaise.   Skin: Positive for daily sunscreen use and activity-related sunscreen use. Negative for wears hat.   Hematologic/Lymphatic: Does not bruise/bleed easily.        Objective:    Physical Exam   Constitutional: He appears well-developed and well-nourished. No distress.   Neurological: He is alert and oriented to person, place, and time. He is not disoriented.   Psychiatric: He has a normal mood and affect. He is not agitated.   Skin:   Areas Examined (abnormalities noted in diagram):   Head / Face Inspection Performed  Neck Inspection Performed  Chest / Axilla Inspection Performed  Abdomen Inspection Performed  Back Inspection Performed  RUE Inspected  LUE Inspection Performed                   Diagram Legend     Erythematous scaling macule/papule c/w actinic keratosis       Vascular papule c/w angioma      Pigmented verrucoid papule/plaque c/w seborrheic keratosis      Yellow umbilicated papule c/w sebaceous hyperplasia      Irregularly shaped tan macule c/w lentigo     1-2 mm smooth white papules consistent with Milia      Movable subcutaneous cyst with punctum c/w epidermal inclusion cyst      Subcutaneous movable cyst c/w pilar cyst      Firm pink to brown papule c/w dermatofibroma      Pedunculated fleshy papule(s) c/w skin tag(s)      Evenly pigmented macule c/w junctional nevus     Mildly variegated  pigmented, slightly irregular-bordered macule c/w mildly atypical nevus      Flesh colored to evenly pigmented papule c/w intradermal nevus       Pink pearly papule/plaque c/w basal cell carcinoma      Erythematous hyperkeratotic cursted plaque c/w SCC      Surgical scar with no sign of skin cancer recurrence      Open and closed comedones      Inflammatory papules and pustules      Verrucoid papule consistent consistent with wart     Erythematous eczematous patches and plaques     Dystrophic onycholytic nail with subungual debris c/w onychomycosis     Umbilicated papule    Erythematous-base heme-crusted tan verrucoid plaque consistent with inflamed seborrheic keratosis     Erythematous Silvery Scaling Plaque c/w Psoriasis     See annotation      Assessment / Plan:        Multiple benign nevi  Benign-appearing on exam today. Counseled pt to monitor mole(s) and return to clinic if any changes noted or symptoms (bleeding, itching, pain, etc) noted. Brochure provided.    Rhytides  Rec: increasing tretinoin to 0.05%. pt prefers to get the SkinMedica product.    EIC (epidermal inclusion cyst)  This is a benign cyst of the hair follicle. Reassurance provided. No treatment is necessary unless it is symptomatic.     Skin cancer screening  Upper body skin examination performed today including at least 6 points as noted in physical examination. No lesions suspicious for malignancy noted.  Patient instructed in importance of daily broad spectrum sunscreen use with spf at least 30. Sun avoidance and topical protection/protective clothing discussed.      Follow up in about 2 years (around 9/30/2022) for skin check or sooner for any concerns.

## 2020-09-30 NOTE — PATIENT INSTRUCTIONS

## 2020-10-01 ENCOUNTER — IMMUNIZATION (OUTPATIENT)
Dept: PHARMACY | Facility: CLINIC | Age: 35
End: 2020-10-01
Payer: COMMERCIAL

## 2020-10-01 ENCOUNTER — OFFICE VISIT (OUTPATIENT)
Dept: OTOLARYNGOLOGY | Facility: CLINIC | Age: 35
End: 2020-10-01
Payer: COMMERCIAL

## 2020-10-01 VITALS
WEIGHT: 315 LBS | TEMPERATURE: 98 F | SYSTOLIC BLOOD PRESSURE: 128 MMHG | DIASTOLIC BLOOD PRESSURE: 84 MMHG | HEIGHT: 76 IN | BODY MASS INDEX: 38.36 KG/M2 | HEART RATE: 68 BPM

## 2020-10-01 DIAGNOSIS — H69.93 DYSFUNCTION OF BOTH EUSTACHIAN TUBES: ICD-10-CM

## 2020-10-01 DIAGNOSIS — H61.23 BILATERAL IMPACTED CERUMEN: ICD-10-CM

## 2020-10-01 DIAGNOSIS — G47.33 OBSTRUCTIVE SLEEP APNEA TREATED WITH CONTINUOUS POSITIVE AIRWAY PRESSURE (CPAP): ICD-10-CM

## 2020-10-01 DIAGNOSIS — J30.9 ALLERGIC RHINITIS, UNSPECIFIED SEASONALITY, UNSPECIFIED TRIGGER: Primary | ICD-10-CM

## 2020-10-01 PROCEDURE — 99214 OFFICE O/P EST MOD 30 MIN: CPT | Mod: 25,S$GLB,, | Performed by: SPECIALIST

## 2020-10-01 PROCEDURE — 3008F BODY MASS INDEX DOCD: CPT | Mod: CPTII,S$GLB,, | Performed by: SPECIALIST

## 2020-10-01 PROCEDURE — 3074F PR MOST RECENT SYSTOLIC BLOOD PRESSURE < 130 MM HG: ICD-10-PCS | Mod: CPTII,S$GLB,, | Performed by: SPECIALIST

## 2020-10-01 PROCEDURE — 3008F PR BODY MASS INDEX (BMI) DOCUMENTED: ICD-10-PCS | Mod: CPTII,S$GLB,, | Performed by: SPECIALIST

## 2020-10-01 PROCEDURE — 69210 EAR CERUMEN REMOVAL: ICD-10-PCS | Mod: S$GLB,,, | Performed by: SPECIALIST

## 2020-10-01 PROCEDURE — 3079F PR MOST RECENT DIASTOLIC BLOOD PRESSURE 80-89 MM HG: ICD-10-PCS | Mod: CPTII,S$GLB,, | Performed by: SPECIALIST

## 2020-10-01 PROCEDURE — 3074F SYST BP LT 130 MM HG: CPT | Mod: CPTII,S$GLB,, | Performed by: SPECIALIST

## 2020-10-01 PROCEDURE — 69210 REMOVE IMPACTED EAR WAX UNI: CPT | Mod: S$GLB,,, | Performed by: SPECIALIST

## 2020-10-01 PROCEDURE — 3079F DIAST BP 80-89 MM HG: CPT | Mod: CPTII,S$GLB,, | Performed by: SPECIALIST

## 2020-10-01 PROCEDURE — 99214 PR OFFICE/OUTPT VISIT, EST, LEVL IV, 30-39 MIN: ICD-10-PCS | Mod: 25,S$GLB,, | Performed by: SPECIALIST

## 2020-10-01 NOTE — PROGRESS NOTES
Subjective:       Patient ID: Ken Anderson is a 35 y.o. male.    Chief Complaint: Follow-up (much better)    Follow-up     The patient has been using Astelin and Flonase twice daily.  He finds that his nasal airway has improved significantly.  He has ears are not blocking his much.  They have some pressure in them but much less than before.  Nasal secretions are clear.  He is satisfied with level of symptom improvement using this regimen.      Review of Systems   Constitutional: Negative for activity change, appetite change and unexpected weight change.   HENT: Positive for hearing loss, postnasal drip and sinus pressure/congestion. Negative for drooling, ear discharge, ear pain (Blockage of both ears), mouth sores, rhinorrhea, sneezing, tinnitus, trouble swallowing and voice change.         Obstructive sleep apnea-using CPAP   Eyes: Negative for photophobia, pain, discharge, redness, itching and visual disturbance.   Respiratory: Negative for apnea, choking, shortness of breath and wheezing.    Cardiovascular: Negative for palpitations.   Gastrointestinal: Negative for abdominal distention.   Musculoskeletal: Negative for neck stiffness.   Integumentary:  Negative for color change and pallor. Negative.   Allergic/Immunologic: Positive for environmental allergies. Negative for food allergies.   Neurological: Negative for dizziness, seizures, facial asymmetry, speech difficulty and light-headedness.   Hematological: Negative for adenopathy. Does not bruise/bleed easily.        Aplastic anemia   Psychiatric/Behavioral: Negative for agitation, confusion, decreased concentration and sleep disturbance.         Objective:      Physical Exam  Vitals signs reviewed.   Constitutional:       Appearance: He is well-developed.   HENT:      Head: Normocephalic.      Jaw: There is normal jaw occlusion.      Salivary Glands: Right salivary gland is not diffusely enlarged. Left salivary gland is not diffusely enlarged.       Right Ear: Ear canal and external ear normal. There is impacted cerumen. Tympanic membrane is retracted. Tympanic membrane has decreased mobility.      Left Ear: Ear canal and external ear normal. There is impacted cerumen. Tympanic membrane is retracted. Tympanic membrane has decreased mobility.      Nose: Septal deviation (To the right), mucosal edema (cyanotic, boggy inferior turbinates bilaterally) and rhinorrhea (clear mucus bilaterally) present.      Right Turbinates: Enlarged and pale.      Left Turbinates: Enlarged and pale.      Mouth/Throat:      Lips: Pink. No lesions.      Mouth: Mucous membranes are moist. No oral lesions.      Dentition: No gum lesions.      Tongue: No lesions. Tongue does not deviate from midline.      Palate: No mass and lesions.      Pharynx: Uvula midline.   Eyes:      General: Lids are normal.         Right eye: No discharge.         Left eye: No discharge.      Conjunctiva/sclera:      Right eye: Right conjunctiva is injected. No exudate.     Left eye: Left conjunctiva is injected. No exudate.     Pupils: Pupils are equal, round, and reactive to light.   Neck:      Musculoskeletal: Normal range of motion. No muscular tenderness.      Thyroid: No thyroid mass or thyromegaly.      Trachea: Trachea normal. No tracheal deviation.   Cardiovascular:      Rate and Rhythm: Normal rate and regular rhythm.      Pulses: Normal pulses.      Heart sounds: Normal heart sounds.   Pulmonary:      Effort: Pulmonary effort is normal.      Breath sounds: Normal breath sounds. No stridor. No decreased breath sounds, wheezing, rhonchi or rales.   Abdominal:      General: Bowel sounds are normal.      Palpations: Abdomen is soft.      Tenderness: There is no abdominal tenderness.   Musculoskeletal: Normal range of motion.   Lymphadenopathy:      Head:      Right side of head: No submental, submandibular, preauricular, posterior auricular or occipital adenopathy.      Left side of head: No submental,  submandibular, preauricular, posterior auricular or occipital adenopathy.      Cervical: No cervical adenopathy.   Skin:     General: Skin is warm and dry.      Findings: No petechiae or rash.      Nails: There is no clubbing.     Neurological:      Mental Status: He is alert and oriented to person, place, and time.      Cranial Nerves: No cranial nerve deficit.      Sensory: No sensory deficit.      Gait: Gait normal.   Psychiatric:         Speech: Speech normal.         Behavior: Behavior normal. Behavior is cooperative.         Thought Content: Thought content normal.         Judgment: Judgment normal.         Procedure-cerumen impaction is removed from both ear canals using a wire loop.  The patient tolerated procedure well was discharged post procedure.    Assessment:       1. Allergic rhinitis, unspecified seasonality, unspecified trigger    2. Dysfunction of both eustachian tubes    3. Bilateral impacted cerumen    4. Obstructive sleep apnea treated with continuous positive airway pressure (CPAP)        Plan:       I will have the patient with his azelastine and fluticasone on a routine basis.  Feels perfectly clear he can stop the azelastine.  I explained and because of underlying medical problems and is extremely important for him to keep is nosal symptoms and allergies controlled, as he is significantly more susceptible to sinus and upper respiratory tract infections.  I will recheck him in 6 months, or sooner on an as-needed basis.

## 2020-10-01 NOTE — PROCEDURES
"Ear Cerumen Removal    Date/Time: 10/1/2020 8:00 AM  Performed by: SHEFALI Oro MD  Authorized by: SHEFALI Oro MD     Time out: Immediately prior to procedure a "time out" was called to verify the correct patient, procedure, equipment, support staff and site/side marked as required.    Consent Done?:  Yes (Verbal)    Local anesthetic:  None  Location details:  Both ears  Procedure type comment:  Wire loop  Cerumen  Removal Results:  Cerumen completely removed  Patient tolerance:  Patient tolerated the procedure well with no immediate complications      "

## 2020-10-19 ENCOUNTER — OFFICE VISIT (OUTPATIENT)
Dept: INTERNAL MEDICINE | Facility: CLINIC | Age: 35
End: 2020-10-19
Payer: COMMERCIAL

## 2020-10-19 DIAGNOSIS — G47.30 SLEEP APNEA, UNSPECIFIED TYPE: Primary | ICD-10-CM

## 2020-10-19 DIAGNOSIS — I10 ESSENTIAL HYPERTENSION: ICD-10-CM

## 2020-10-19 PROCEDURE — 99999 PR PBB SHADOW E&M-EST. PATIENT-LVL V: ICD-10-PCS | Mod: PBBFAC,,, | Performed by: INTERNAL MEDICINE

## 2020-10-19 PROCEDURE — 3080F PR MOST RECENT DIASTOLIC BLOOD PRESSURE >= 90 MM HG: ICD-10-PCS | Mod: CPTII,S$GLB,, | Performed by: INTERNAL MEDICINE

## 2020-10-19 PROCEDURE — 99214 OFFICE O/P EST MOD 30 MIN: CPT | Mod: S$GLB,,, | Performed by: INTERNAL MEDICINE

## 2020-10-19 PROCEDURE — 3074F PR MOST RECENT SYSTOLIC BLOOD PRESSURE < 130 MM HG: ICD-10-PCS | Mod: CPTII,S$GLB,, | Performed by: INTERNAL MEDICINE

## 2020-10-19 PROCEDURE — 99999 PR PBB SHADOW E&M-EST. PATIENT-LVL V: CPT | Mod: PBBFAC,,, | Performed by: INTERNAL MEDICINE

## 2020-10-19 PROCEDURE — 3074F SYST BP LT 130 MM HG: CPT | Mod: CPTII,S$GLB,, | Performed by: INTERNAL MEDICINE

## 2020-10-19 PROCEDURE — 3008F PR BODY MASS INDEX (BMI) DOCUMENTED: ICD-10-PCS | Mod: CPTII,S$GLB,, | Performed by: INTERNAL MEDICINE

## 2020-10-19 PROCEDURE — 99214 PR OFFICE/OUTPT VISIT, EST, LEVL IV, 30-39 MIN: ICD-10-PCS | Mod: S$GLB,,, | Performed by: INTERNAL MEDICINE

## 2020-10-19 PROCEDURE — 3008F BODY MASS INDEX DOCD: CPT | Mod: CPTII,S$GLB,, | Performed by: INTERNAL MEDICINE

## 2020-10-19 PROCEDURE — 3080F DIAST BP >= 90 MM HG: CPT | Mod: CPTII,S$GLB,, | Performed by: INTERNAL MEDICINE

## 2020-10-19 RX ORDER — PENICILLIN V POTASSIUM 500 MG/1
TABLET, FILM COATED ORAL
COMMUNITY
Start: 2020-10-07 | End: 2021-01-15

## 2020-10-19 RX ORDER — LOSARTAN POTASSIUM 50 MG/1
50 TABLET ORAL DAILY
Qty: 90 TABLET | Refills: 0 | Status: SHIPPED | OUTPATIENT
Start: 2020-10-19 | End: 2020-12-01 | Stop reason: SDUPTHER

## 2020-10-19 RX ORDER — AMLODIPINE BESYLATE 10 MG/1
10 TABLET ORAL DAILY
Qty: 90 TABLET | Refills: 0 | Status: SHIPPED | OUTPATIENT
Start: 2020-10-19 | End: 2021-01-11

## 2020-10-19 RX ORDER — ONDANSETRON HYDROCHLORIDE 8 MG/1
TABLET, FILM COATED ORAL
COMMUNITY
Start: 2020-09-28

## 2020-10-19 RX ORDER — ESKETAMINE HYDROCHLORIDE 28 MG/.2ML
SOLUTION NASAL
COMMUNITY
Start: 2020-09-01

## 2020-10-19 RX ORDER — MIRTAZAPINE 15 MG/1
TABLET, FILM COATED ORAL
COMMUNITY
Start: 2020-10-01 | End: 2022-03-23

## 2020-10-23 ENCOUNTER — OFFICE VISIT (OUTPATIENT)
Dept: PULMONOLOGY | Facility: CLINIC | Age: 35
End: 2020-10-23
Payer: COMMERCIAL

## 2020-10-23 DIAGNOSIS — G47.33 OBSTRUCTIVE SLEEP APNEA TREATED WITH CONTINUOUS POSITIVE AIRWAY PRESSURE (CPAP): ICD-10-CM

## 2020-10-23 DIAGNOSIS — G47.09 OTHER INSOMNIA: ICD-10-CM

## 2020-10-23 DIAGNOSIS — G47.33 OSA (OBSTRUCTIVE SLEEP APNEA): ICD-10-CM

## 2020-10-23 PROCEDURE — 99213 PR OFFICE/OUTPT VISIT, EST, LEVL III, 20-29 MIN: ICD-10-PCS | Mod: 95,,, | Performed by: INTERNAL MEDICINE

## 2020-10-23 PROCEDURE — 99213 OFFICE O/P EST LOW 20 MIN: CPT | Mod: 95,,, | Performed by: INTERNAL MEDICINE

## 2020-10-23 NOTE — PROGRESS NOTES
Ken Anderson  was seen as a follow up.  The patient location is: home  The chief complaint leading to consultation is: marco    Visit type: audiovisual    Face to Face time with patient: 15 minutes of total time spent on the encounter, which includes face to face time and non-face to face time preparing to see the patient (eg, review of tests), Obtaining and/or reviewing separately obtained history, Documenting clinical information in the electronic or other health record, Independently interpreting results (not separately reported) and communicating results to the patient/family/caregiver, or Care coordination (not separately reported).         Each patient to whom he or she provides medical services by telemedicine is:  (1) informed of the relationship between the physician and patient and the respective role of any other health care provider with respect to management of the patient; and (2) notified that he or she may decline to receive medical services by telemedicine and may withdraw from such care at any time.    Note:      CHIEF COMPLAINT:    No chief complaint on file.      HISTORY OF PRESENT ILLNESS: Ken Anderson is a 35 y.o. male is here for sleep evaluation.   Our first enconter was 9/23/14. Patient was told by father and sister (both are sleep boarded) to have sleep apnea.  Patient with loud snoring.  +fatigue upon awakening.  +weight gain 60 # since 2013.  +sleepiness a/w driving on rare occasion.  No parasomnia.  No cataplexy.  No rls symptoms.      [place] Sleepiness Scale score during initial sleep evaluation was 7.    S/p hst in 11/2014 with ahi of 8.  Patient was set up for apap. +using apap most night.  Sleep better with apap.  Rested upon awake.  No snoring with apap.  No difficulty with sleep initiation.  No dry mouth.  Patient admits to lots of stress with new position  of [employer] in [place].  +stress eating.      Since our last encounter, patient was diagnosed with dvt  9/18.  Currently on eloquis.  Patient started with new psychiatrist, Dr. Velázquez.  Patient was started on spravato and remeron.  Off trazadone.  Feeling better.  Working from home.      SLEEP ROUTINE:  Activity the hour prior to sleep: read     Bed partner:  alone  Time to bed:  9:30-10:30 pm   Lights off:  yes  Sleep onset latency:  10 minutes        Disruptions or awakenings:    none    Wakeup time:      8:00 am  Perceived sleep quality:  Rested      Daytime naps:      none  Weekend sleep routine:      12 am till 8 am   Caffeine use: 2 cups of coffee in am and 1 diet coke throughout the day; last coke around 3 pm  exercise habit:   [employer] 6 am daily    PAST MEDICAL HISTORY:    Active Ambulatory Problems     Diagnosis Date Noted    ADHD (attention deficit hyperactivity disorder) 06/29/2012    Severe episode of recurrent major depressive disorder, without psychotic features 06/29/2012    Generalized anxiety disorder 06/29/2012    Panic disorder without agoraphobia 06/29/2012    Adjustment disorder with mixed emotional features 06/29/2012    Aplastic anemia 07/23/2012    Essential hypertension 10/12/2015    Knee pain, bilateral 02/22/2016    Hypertension not at goal 05/10/2017    Major depressive disorder with single episode, in full remission 03/09/2018    Insomnia 04/17/2019    History of smoking 12/17/2019    BMI 40.0-44.9, adult 12/17/2019    Obstructive sleep apnea treated with continuous positive airway pressure (CPAP) 08/18/2020    Dysfunction of both eustachian tubes 08/18/2020    Nasal septal deviation 08/18/2020    Allergic rhinitis 08/18/2020     Resolved Ambulatory Problems     Diagnosis Date Noted    Depressive disorder, not elsewhere classified 07/30/2012    Adjustment disorder with mixed anxiety and depressed mood 07/30/2012    Smoking addiction 08/21/2012    ADD (attention deficit disorder) 10/01/2012    Major depressive disorder, recurrent episode, moderate 11/12/2012     Anxiety state, unspecified 2013    Infiltrate of cornea - Right Eye 2013    Cornea ulcer - Right Eye 2013    BMI 38.0-38.9,adult 05/10/2017    Tobacco abuse disorder 05/10/2017    JUSTO (obstructive sleep apnea) 2019     Past Medical History:   Diagnosis Date    Anxiety state, unspecified 2013    Blood transfusion     Depression     GERD (gastroesophageal reflux disease)     History of eye injury 13YRS    Major depressive disorder, recurrent episode, mild 2012                PAST SURGICAL HISTORY:    Past Surgical History:   Procedure Laterality Date    BONE MARROW BIOPSY      COLONOSCOPY      ESOPHAGOGASTRODUODENOSCOPY           FAMILY HISTORY:                Family History   Problem Relation Age of Onset    Cancer Father         skin    Skin cancer Father     Emphysema Maternal Grandmother     Prostate cancer Maternal Grandfather     Dementia Paternal Grandmother     Cancer Paternal Grandfather         liver    No Known Problems Mother     No Known Problems Sister     No Known Problems Sister     No Known Problems Brother     No Known Problems Maternal Aunt     No Known Problems Maternal Uncle     No Known Problems Paternal Aunt     No Known Problems Paternal Uncle     Melanoma Neg Hx     Psoriasis Neg Hx     Lupus Neg Hx     Eczema Neg Hx     Amblyopia Neg Hx     Blindness Neg Hx     Cataracts Neg Hx     Diabetes Neg Hx     Glaucoma Neg Hx     Hypertension Neg Hx     Macular degeneration Neg Hx     Retinal detachment Neg Hx     Strabismus Neg Hx     Stroke Neg Hx     Thyroid disease Neg Hx        SOCIAL HISTORY:          Tobacco:   Social History     Tobacco Use   Smoking Status Former Smoker    Packs/day: 0.30    Years: 7.00    Pack years: 2.10    Types: Cigarettes    Quit date: 2018    Years since quittin.1   Smokeless Tobacco Current User       alcohol use:    Social History     Substance and Sexual Activity   Alcohol Use  Yes    Alcohol/week: 4.0 standard drinks    Types: 4 Standard drinks or equivalent per week    Frequency: 2-3 times a week    Drinks per session: 1 or 2    Binge frequency: Less than monthly    Comment: 4 week                 Occupation:   for [employer]    ALLERGIES:    Review of patient's allergies indicates:   Allergen Reactions    No known drug allergies        CURRENT MEDICATIONS:    Current Outpatient Medications   Medication Sig Dispense Refill    acetaminophen (TYLENOL) 325 MG tablet Take 325 mg by mouth every 6 (six) hours as needed for Pain.      albuterol (PROVENTIL/VENTOLIN HFA) 90 mcg/actuation inhaler Inhale 2 puffs into the lungs every 6 (six) hours as needed for Wheezing. Rescue 18 g 0    amLODIPine (NORVASC) 10 MG tablet Take 1 tablet (10 mg total) by mouth once daily. 90 tablet 0    azelastine (ASTELIN) 137 mcg (0.1 %) nasal spray Two sprays in each nostril, sniff until absorbed, then follow with 1 spray of fluticasone.  Use both sprays twice daily. 30 mL 11    buPROPion (WELLBUTRIN XL) 300 MG 24 hr tablet Take 1 tablet (300 mg total) by mouth once daily. 90 tablet 3    CALCIUM CITRATE/VITAMIN D3 (CITRACAL REGULAR ORAL) Take by mouth.      citalopram (CELEXA) 40 MG tablet TAKE 1 TABLET(40 MG) BY MOUTH EVERY DAY 90 tablet 3    ELIQUIS 5 mg Tab TK 1 T PO BID AS DIRECTED  3    esketamine (SPRAVATO) 84 mg (28 mg x 3) Spry       flu vacc [id number] 6mos up,PF, (FLUARIX QUAD [id number], PF,) 60 mcg (15 mcg x 4)/0.5 mL Syrg adminster 0.5 mL 0    fluticasone propionate (FLONASE) 50 mcg/actuation nasal spray One spray in each nostril twice daily after 1st using azelastine nasal spray 18.2 mL 11    ketoconazole (NIZORAL) 2 % cream AAA bid 60 g 3    ketoconazole (NIZORAL) 2 % shampoo Wash hair and beard 3x/week 120 mL 11    lidocaine HCL 2% (XYLOCAINE) 2 % jelly Apply topically as needed. Apply topically once nightly to affected part of foot/feet. 30 mL 2     lisdexamfetamine (VYVANSE) 30 MG capsule Take 1 capsule (30 mg total) by mouth every morning. 30 capsule 0    lisdexamfetamine (VYVANSE) 30 MG capsule Take 1 capsule (30 mg total) by mouth every morning. 30 capsule 0    lisdexamfetamine (VYVANSE) 30 MG capsule Take 1 capsule (30 mg total) by mouth every morning. 30 capsule 0    LORazepam (ATIVAN) 1 MG tablet TAKE 1 TABLET(1 MG) BY MOUTH DAILY AS NEEDED FOR ANXIETY 30 tablet 5    losartan (COZAAR) 50 MG tablet Take 1 tablet (50 mg total) by mouth once daily. 90 tablet 0    mirtazapine (REMERON) 15 MG tablet TK 1 T PO Q NIGHT PRN      nystatin-triamcinolone (MYCOLOG) ointment AAA at corner of mouth BID and continue use for 2 days after resolution of rash 30 g 1    ondansetron (ZOFRAN) 8 MG tablet TK 1 T PO QD PRN      penicillin v potassium (VEETID) 500 MG tablet TK 1 T PO D      ravulizumab-cwvz (ULTOMIRIS) 300 mg/30 mL (10 mg/mL) Soln Inject into the vein.      tadalafiL (CIALIS) 20 MG Tab Take 1 tablet (20 mg total) by mouth daily as needed. 30 tablet 11    traZODone (DESYREL) 100 MG tablet TAKE 1/2 TO 1 TABLET BY MOUTH AT BEDTIME FOR INSOMNIA 90 tablet 3    valACYclovir (VALTREX) 1000 MG tablet TAKE 2 TABLETS BY MOUTH TWICE DAILY FOR 1 DAY; BEGIN AT FIRST SIGN OF OUTBREAK 12 tablet 0     No current facility-administered medications for this visit.                   REVIEW OF SYSTEMS:     Sleep related symptoms as per HPI.  CONST:  denies weight gain    HEENT: Denies sinus congestion  PULM: Denies dyspnea  CARD:  Denies palpitations   GI:  Denies acid reflux  : Denies polyuria  NEURO: Denies headaches  PSYCH: Denies mood disturbance  HEME: Denies anemia   Otherwise, a balance of systems reviewed is negative.                  PHYSICAL EXAM:  There were no vitals filed for this visit.  There is no height or weight on file to calculate BMI.     GENERAL: Normal development, well groomed.  No apparent distress.    HEENT:   extra oculomotor is intact  NECK:  n/a  SKIN: On face and neck: No abrasions, no rashes, no lesions.    RESPIRATORY:   Normal chest expansion and non-labored breathing at rest.  CARDIOVASCULAR: n/a    EXTREMITIES: n/a  NEURO/PSYCH: Oriented to time, place and person. Normal attention span and concentration. Affect is full. Mood is normal.                                        DATA hst 11/10/14 ahi of 9    Lab Results   Component Value Date    TSH 1.012 10/19/2020     ASSESSMENT  Problem List Items Addressed This Visit     Insomnia    Overview     sleep initiation improve.           Obstructive sleep apnea treated with continuous positive airway pressure (CPAP)    Overview     Doing well with apap and nasal mask.  100%>4 hours.  Patient is benefiting from apap.  Have not need chin strap.          RESOLVED: JUSTO (obstructive sleep apnea)           Nasal congestion - currently on flonase, sinus irrigation and allegra.      Tobacco abuse - off chantix.  Still smoke 1 ppd.      Obesity - gained 20 lbs since 2017.   Per patient, patient is loosing weight with diet and exercise.    Education: During our discussion today, we talked about the etiology of obstructive sleep apnea as well as the potential ramifications of untreated sleep apnea, which could include daytime sleepiness, hypertension, heart disease and/or stroke.      Precautions: The patient was advised to abstain from driving should they feel sleepy or drowsy.     Patient will No follow-ups on file.

## 2020-10-24 VITALS
OXYGEN SATURATION: 96 % | BODY MASS INDEX: 38.36 KG/M2 | WEIGHT: 315 LBS | TEMPERATURE: 99 F | DIASTOLIC BLOOD PRESSURE: 94 MMHG | SYSTOLIC BLOOD PRESSURE: 118 MMHG | HEART RATE: 78 BPM | HEIGHT: 76 IN

## 2020-10-24 NOTE — PROGRESS NOTES
Subjective:       Patient ID: Ken Anderson is a 35 y.o. male.    Chief Complaint: Hypertension    HPI  He returns for management of hypertension.  He has had hypertension for over a year.  Current treatment has included medications outlined in medication list.  He denies chest pain or shortness of breath.  No palpitations.  Denies left arm or neck pain.    Past medical history:  Hypertension, attention deficit disorder, depression, aplastic anemia, PNH, sleep apnea, DVT     Medications:   Norvasc 10 mg daily, Eliquis     No known drug allergies       Review of Systems   Constitutional: Negative for chills, fatigue, fever and unexpected weight change.   Respiratory: Negative for chest tightness and shortness of breath.    Cardiovascular: Negative for chest pain and palpitations.   Gastrointestinal: Negative for abdominal pain and blood in stool.   Neurological: Negative for dizziness, syncope, numbness and headaches.       Objective:      Physical Exam  HENT:      Right Ear: External ear normal.      Left Ear: External ear normal.      Nose: Nose normal.      Mouth/Throat:      Mouth: Mucous membranes are moist.      Pharynx: Oropharynx is clear.   Eyes:      Pupils: Pupils are equal, round, and reactive to light.   Neck:      Musculoskeletal: Normal range of motion.   Cardiovascular:      Rate and Rhythm: Normal rate and regular rhythm.      Heart sounds: No murmur.   Pulmonary:      Breath sounds: Normal breath sounds.   Abdominal:      General: There is no distension.      Palpations: There is no hepatomegaly.      Tenderness: There is no abdominal tenderness.   Lymphadenopathy:      Cervical: No cervical adenopathy.      Upper Body:      Right upper body: No axillary adenopathy.      Left upper body: No axillary adenopathy.   Neurological:      Cranial Nerves: No cranial nerve deficit.      Sensory: No sensory deficit.      Motor: Motor function is intact.      Deep Tendon Reflexes: Reflexes are normal  and symmetric.         Assessment/Plan       Assessment and plan:  Hypertension:  Add Cozaar 50 mg daily.  Return to clinic in 1 month blood pressure check.  Check CMP, lipid panel, TSH.  He sees a hematologist at [facility].  He sees a psychiatrist outside of [facility].  Advised him to discuss with his psychiatrist alternative treatment for his attention deficit disorder because of his hypertension

## 2020-11-19 ENCOUNTER — TELEPHONE (OUTPATIENT)
Dept: DERMATOLOGY | Facility: CLINIC | Age: 35
End: 2020-11-19

## 2020-11-19 NOTE — TELEPHONE ENCOUNTER
Left a voicemail to let pt know that he could come to the  and they may be able to reprint for him.    ----- Message from Joya Zhou sent at 11/19/2020 12:58 PM CST -----        Patient is looking to get a copy of a receipt for some Retinol he purchased from [facility] on 9/30   He has already contacted billing & Pharmacy and told needs to come from [facility]  Please contact patient back as soon as possible @# [phone]

## 2020-12-01 ENCOUNTER — LAB VISIT (OUTPATIENT)
Dept: LAB | Facility: HOSPITAL | Age: 35
End: 2020-12-01
Attending: INTERNAL MEDICINE
Payer: COMMERCIAL

## 2020-12-01 ENCOUNTER — OFFICE VISIT (OUTPATIENT)
Dept: INTERNAL MEDICINE | Facility: CLINIC | Age: 35
End: 2020-12-01
Payer: COMMERCIAL

## 2020-12-01 DIAGNOSIS — I10 HYPERTENSION, UNSPECIFIED TYPE: Primary | ICD-10-CM

## 2020-12-01 DIAGNOSIS — I10 HYPERTENSION, UNSPECIFIED TYPE: ICD-10-CM

## 2020-12-01 LAB
ANION GAP SERPL CALC-SCNC: 10 MMOL/L (ref 8–16)
BUN SERPL-MCNC: 18 MG/DL (ref 6–20)
CALCIUM SERPL-MCNC: 9.2 MG/DL (ref 8.7–10.5)
CHLORIDE SERPL-SCNC: 103 MMOL/L (ref 95–110)
CO2 SERPL-SCNC: 24 MMOL/L (ref 23–29)
CREAT SERPL-MCNC: 1.1 MG/DL (ref 0.5–1.4)
EST. GFR  (AFRICAN AMERICAN): >60 ML/MIN/1.73 M^2
EST. GFR  (NON AFRICAN AMERICAN): >60 ML/MIN/1.73 M^2
GLUCOSE SERPL-MCNC: 104 MG/DL (ref 70–110)
POTASSIUM SERPL-SCNC: 4.2 MMOL/L (ref 3.5–5.1)
SODIUM SERPL-SCNC: 137 MMOL/L (ref 136–145)

## 2020-12-01 PROCEDURE — 3079F DIAST BP 80-89 MM HG: CPT | Mod: CPTII,S$GLB,, | Performed by: INTERNAL MEDICINE

## 2020-12-01 PROCEDURE — 80048 BASIC METABOLIC PNL TOTAL CA: CPT

## 2020-12-01 PROCEDURE — 3008F PR BODY MASS INDEX (BMI) DOCUMENTED: ICD-10-PCS | Mod: CPTII,S$GLB,, | Performed by: INTERNAL MEDICINE

## 2020-12-01 PROCEDURE — 3008F BODY MASS INDEX DOCD: CPT | Mod: CPTII,S$GLB,, | Performed by: INTERNAL MEDICINE

## 2020-12-01 PROCEDURE — 36415 COLL VENOUS BLD VENIPUNCTURE: CPT

## 2020-12-01 PROCEDURE — 99214 PR OFFICE/OUTPT VISIT, EST, LEVL IV, 30-39 MIN: ICD-10-PCS | Mod: S$GLB,,, | Performed by: INTERNAL MEDICINE

## 2020-12-01 PROCEDURE — 3079F PR MOST RECENT DIASTOLIC BLOOD PRESSURE 80-89 MM HG: ICD-10-PCS | Mod: CPTII,S$GLB,, | Performed by: INTERNAL MEDICINE

## 2020-12-01 PROCEDURE — 99999 PR PBB SHADOW E&M-EST. PATIENT-LVL V: ICD-10-PCS | Mod: PBBFAC,,, | Performed by: INTERNAL MEDICINE

## 2020-12-01 PROCEDURE — 99999 PR PBB SHADOW E&M-EST. PATIENT-LVL V: CPT | Mod: PBBFAC,,, | Performed by: INTERNAL MEDICINE

## 2020-12-01 PROCEDURE — 3075F PR MOST RECENT SYSTOLIC BLOOD PRESS GE 130-139MM HG: ICD-10-PCS | Mod: CPTII,S$GLB,, | Performed by: INTERNAL MEDICINE

## 2020-12-01 PROCEDURE — 3075F SYST BP GE 130 - 139MM HG: CPT | Mod: CPTII,S$GLB,, | Performed by: INTERNAL MEDICINE

## 2020-12-01 PROCEDURE — 99214 OFFICE O/P EST MOD 30 MIN: CPT | Mod: S$GLB,,, | Performed by: INTERNAL MEDICINE

## 2020-12-01 RX ORDER — LOSARTAN POTASSIUM 50 MG/1
50 TABLET ORAL DAILY
Qty: 90 TABLET | Refills: 0 | Status: SHIPPED | OUTPATIENT
Start: 2020-12-01 | End: 2021-04-16 | Stop reason: SDUPTHER

## 2020-12-05 VITALS
HEIGHT: 76 IN | DIASTOLIC BLOOD PRESSURE: 80 MMHG | OXYGEN SATURATION: 96 % | TEMPERATURE: 99 F | WEIGHT: 315 LBS | HEART RATE: 70 BPM | SYSTOLIC BLOOD PRESSURE: 138 MMHG | BODY MASS INDEX: 38.36 KG/M2

## 2020-12-06 NOTE — PROGRESS NOTES
Subjective:       Patient ID: Ken Anderson is a 35 y.o. male.    Chief Complaint: Hypertension    HPI  He returns for management of hypertension.  He has had hypertension for over a year.  Current treatment has included medications outlined in medication list.  He denies chest pain or shortness of breath.  No palpitations.  Denies left arm or neck pain.    Medications:  See med list    Social history:  Does not smoke, does not drink alcohol      Review of Systems   Constitutional: Negative for chills, fatigue, fever and unexpected weight change.   Respiratory: Negative for chest tightness and shortness of breath.    Cardiovascular: Negative for chest pain and palpitations.   Gastrointestinal: Negative for abdominal pain and blood in stool.   Neurological: Negative for dizziness, syncope, numbness and headaches.       Objective:      Physical Exam  HENT:      Right Ear: External ear normal.      Left Ear: External ear normal.      Nose: Nose normal.      Mouth/Throat:      Mouth: Mucous membranes are moist.      Pharynx: Oropharynx is clear.   Eyes:      Pupils: Pupils are equal, round, and reactive to light.   Neck:      Musculoskeletal: Normal range of motion.   Cardiovascular:      Rate and Rhythm: Normal rate and regular rhythm.      Heart sounds: No murmur.   Pulmonary:      Breath sounds: Normal breath sounds.   Abdominal:      General: There is no distension.      Palpations: There is no hepatomegaly.      Tenderness: There is no abdominal tenderness.   Lymphadenopathy:      Cervical: No cervical adenopathy.      Upper Body:      Right upper body: No axillary adenopathy.      Left upper body: No axillary adenopathy.   Neurological:      Cranial Nerves: No cranial nerve deficit.      Sensory: No sensory deficit.      Motor: Motor function is intact.      Deep Tendon Reflexes: Reflexes are normal and symmetric.         Assessment/Plan       Assessment and plan:  Hypertension:  Check BMP

## 2020-12-28 ENCOUNTER — PATIENT MESSAGE (OUTPATIENT)
Dept: INTERNAL MEDICINE | Facility: CLINIC | Age: 35
End: 2020-12-28

## 2021-01-15 ENCOUNTER — OFFICE VISIT (OUTPATIENT)
Dept: OTOLARYNGOLOGY | Facility: CLINIC | Age: 36
End: 2021-01-15
Payer: COMMERCIAL

## 2021-01-15 VITALS
HEART RATE: 80 BPM | SYSTOLIC BLOOD PRESSURE: 141 MMHG | WEIGHT: 315 LBS | DIASTOLIC BLOOD PRESSURE: 98 MMHG | TEMPERATURE: 98 F | HEIGHT: 76 IN | BODY MASS INDEX: 38.36 KG/M2

## 2021-01-15 DIAGNOSIS — J34.2 NASAL SEPTAL DEVIATION: ICD-10-CM

## 2021-01-15 DIAGNOSIS — H69.93 DYSFUNCTION OF BOTH EUSTACHIAN TUBES: ICD-10-CM

## 2021-01-15 DIAGNOSIS — J30.9 ALLERGIC RHINITIS, UNSPECIFIED SEASONALITY, UNSPECIFIED TRIGGER: ICD-10-CM

## 2021-01-15 DIAGNOSIS — H61.23 BILATERAL IMPACTED CERUMEN: Primary | ICD-10-CM

## 2021-01-15 PROCEDURE — 3008F PR BODY MASS INDEX (BMI) DOCUMENTED: ICD-10-PCS | Mod: CPTII,S$GLB,, | Performed by: SPECIALIST

## 2021-01-15 PROCEDURE — 69210 REMOVE IMPACTED EAR WAX UNI: CPT | Mod: S$GLB,,, | Performed by: SPECIALIST

## 2021-01-15 PROCEDURE — 1125F AMNT PAIN NOTED PAIN PRSNT: CPT | Mod: S$GLB,,, | Performed by: SPECIALIST

## 2021-01-15 PROCEDURE — 3077F SYST BP >= 140 MM HG: CPT | Mod: CPTII,S$GLB,, | Performed by: SPECIALIST

## 2021-01-15 PROCEDURE — 3080F DIAST BP >= 90 MM HG: CPT | Mod: CPTII,S$GLB,, | Performed by: SPECIALIST

## 2021-01-15 PROCEDURE — 1125F PR PAIN SEVERITY QUANTIFIED, PAIN PRESENT: ICD-10-PCS | Mod: S$GLB,,, | Performed by: SPECIALIST

## 2021-01-15 PROCEDURE — 99999 PR PBB SHADOW E&M-EST. PATIENT-LVL V: CPT | Mod: PBBFAC,,, | Performed by: SPECIALIST

## 2021-01-15 PROCEDURE — 3008F BODY MASS INDEX DOCD: CPT | Mod: CPTII,S$GLB,, | Performed by: SPECIALIST

## 2021-01-15 PROCEDURE — 99213 PR OFFICE/OUTPT VISIT, EST, LEVL III, 20-29 MIN: ICD-10-PCS | Mod: 25,S$GLB,, | Performed by: SPECIALIST

## 2021-01-15 PROCEDURE — 69210 EAR CERUMEN REMOVAL: ICD-10-PCS | Mod: S$GLB,,, | Performed by: SPECIALIST

## 2021-01-15 PROCEDURE — 99999 PR PBB SHADOW E&M-EST. PATIENT-LVL V: ICD-10-PCS | Mod: PBBFAC,,, | Performed by: SPECIALIST

## 2021-01-15 PROCEDURE — 3080F PR MOST RECENT DIASTOLIC BLOOD PRESSURE >= 90 MM HG: ICD-10-PCS | Mod: CPTII,S$GLB,, | Performed by: SPECIALIST

## 2021-01-15 PROCEDURE — 99213 OFFICE O/P EST LOW 20 MIN: CPT | Mod: 25,S$GLB,, | Performed by: SPECIALIST

## 2021-01-15 PROCEDURE — 3077F PR MOST RECENT SYSTOLIC BLOOD PRESSURE >= 140 MM HG: ICD-10-PCS | Mod: CPTII,S$GLB,, | Performed by: SPECIALIST

## 2021-01-26 ENCOUNTER — PATIENT MESSAGE (OUTPATIENT)
Dept: DERMATOLOGY | Facility: CLINIC | Age: 36
End: 2021-01-26

## 2021-01-26 DIAGNOSIS — L98.8 RHYTIDES: Primary | ICD-10-CM

## 2021-01-27 RX ORDER — TRETINOIN 0.5 MG/G
CREAM TOPICAL
Qty: 45 G | Refills: 3 | Status: SHIPPED | OUTPATIENT
Start: 2021-01-27 | End: 2022-07-06 | Stop reason: SDUPTHER

## 2021-02-03 ENCOUNTER — PATIENT MESSAGE (OUTPATIENT)
Dept: INTERNAL MEDICINE | Facility: CLINIC | Age: 36
End: 2021-02-03

## 2021-02-03 ENCOUNTER — PATIENT MESSAGE (OUTPATIENT)
Dept: DERMATOLOGY | Facility: CLINIC | Age: 36
End: 2021-02-03

## 2021-02-17 ENCOUNTER — OFFICE VISIT (OUTPATIENT)
Dept: UROLOGY | Facility: CLINIC | Age: 36
End: 2021-02-17
Attending: UROLOGY
Payer: COMMERCIAL

## 2021-02-17 VITALS
HEIGHT: 76 IN | SYSTOLIC BLOOD PRESSURE: 144 MMHG | WEIGHT: 315 LBS | HEART RATE: 68 BPM | DIASTOLIC BLOOD PRESSURE: 80 MMHG | BODY MASS INDEX: 38.36 KG/M2

## 2021-02-17 DIAGNOSIS — N52.9 ERECTILE DYSFUNCTION, UNSPECIFIED ERECTILE DYSFUNCTION TYPE: Primary | ICD-10-CM

## 2021-02-17 PROCEDURE — 3079F DIAST BP 80-89 MM HG: CPT | Mod: CPTII,S$GLB,, | Performed by: UROLOGY

## 2021-02-17 PROCEDURE — 3077F SYST BP >= 140 MM HG: CPT | Mod: CPTII,S$GLB,, | Performed by: UROLOGY

## 2021-02-17 PROCEDURE — 3077F PR MOST RECENT SYSTOLIC BLOOD PRESSURE >= 140 MM HG: ICD-10-PCS | Mod: CPTII,S$GLB,, | Performed by: UROLOGY

## 2021-02-17 PROCEDURE — 99213 PR OFFICE/OUTPT VISIT, EST, LEVL III, 20-29 MIN: ICD-10-PCS | Mod: S$GLB,,, | Performed by: UROLOGY

## 2021-02-17 PROCEDURE — 3008F BODY MASS INDEX DOCD: CPT | Mod: CPTII,S$GLB,, | Performed by: UROLOGY

## 2021-02-17 PROCEDURE — 1126F AMNT PAIN NOTED NONE PRSNT: CPT | Mod: S$GLB,,, | Performed by: UROLOGY

## 2021-02-17 PROCEDURE — 3008F PR BODY MASS INDEX (BMI) DOCUMENTED: ICD-10-PCS | Mod: CPTII,S$GLB,, | Performed by: UROLOGY

## 2021-02-17 PROCEDURE — 99213 OFFICE O/P EST LOW 20 MIN: CPT | Mod: S$GLB,,, | Performed by: UROLOGY

## 2021-02-17 PROCEDURE — 1126F PR PAIN SEVERITY QUANTIFIED, NO PAIN PRESENT: ICD-10-PCS | Mod: S$GLB,,, | Performed by: UROLOGY

## 2021-02-17 PROCEDURE — 3079F PR MOST RECENT DIASTOLIC BLOOD PRESSURE 80-89 MM HG: ICD-10-PCS | Mod: CPTII,S$GLB,, | Performed by: UROLOGY

## 2021-02-17 RX ORDER — TADALAFIL 20 MG/1
20 TABLET ORAL
Qty: 30 TABLET | Refills: 11 | Status: SHIPPED | OUTPATIENT
Start: 2021-02-17 | End: 2022-07-29 | Stop reason: SDUPTHER

## 2021-02-17 RX ORDER — PENICILLIN V POTASSIUM 250 MG/1
250 TABLET, FILM COATED ORAL 2 TIMES DAILY
COMMUNITY
Start: 2021-01-12

## 2021-02-17 RX ORDER — ZOLPIDEM TARTRATE 12.5 MG/1
12.5 TABLET, FILM COATED, EXTENDED RELEASE ORAL NIGHTLY PRN
COMMUNITY
Start: 2021-02-11

## 2021-02-20 ENCOUNTER — HOSPITAL ENCOUNTER (EMERGENCY)
Facility: OTHER | Age: 36
Discharge: HOME | End: 2021-02-20
Attending: EMERGENCY MEDICINE
Payer: COMMERCIAL

## 2021-02-20 VITALS
WEIGHT: 315 LBS | RESPIRATION RATE: 18 BRPM | DIASTOLIC BLOOD PRESSURE: 91 MMHG | BODY MASS INDEX: 38.36 KG/M2 | HEIGHT: 76 IN | OXYGEN SATURATION: 97 % | SYSTOLIC BLOOD PRESSURE: 167 MMHG | HEART RATE: 88 BPM | TEMPERATURE: 98 F

## 2021-02-20 DIAGNOSIS — I82.552 CHRONIC DEEP VEIN THROMBOSIS (DVT) OF LEFT PERONEAL VEIN: Primary | ICD-10-CM

## 2021-02-20 DIAGNOSIS — M79.606 LEG PAIN: ICD-10-CM

## 2021-02-20 DIAGNOSIS — R03.0 ELEVATED BLOOD PRESSURE READING: ICD-10-CM

## 2021-02-20 LAB
CREAT SERPL-MCNC: 1 MG/DL (ref 0.5–1.4)
EXTRA GREEN TOP RAINBOW: NORMAL
EXTRA LAVENDER TOP RAINBOW: NORMAL
SAMPLE: NORMAL

## 2021-02-20 PROCEDURE — 99284 EMERGENCY DEPT VISIT MOD MDM: CPT | Mod: 25

## 2021-02-20 RX ORDER — ENOXAPARIN SODIUM 150 MG/ML
1 INJECTION SUBCUTANEOUS 2 TIMES DAILY
Qty: 10 ML | Refills: 0 | Status: SHIPPED | OUTPATIENT
Start: 2021-02-20 | End: 2021-02-24 | Stop reason: SDUPTHER

## 2021-03-03 ENCOUNTER — PATIENT MESSAGE (OUTPATIENT)
Dept: SPORTS MEDICINE | Facility: CLINIC | Age: 36
End: 2021-03-03

## 2021-03-03 ENCOUNTER — HOSPITAL ENCOUNTER (OUTPATIENT)
Dept: RADIOLOGY | Facility: HOSPITAL | Age: 36
Discharge: HOME | End: 2021-03-03
Attending: FAMILY MEDICINE
Payer: COMMERCIAL

## 2021-03-03 ENCOUNTER — OFFICE VISIT (OUTPATIENT)
Dept: SPORTS MEDICINE | Facility: CLINIC | Age: 36
End: 2021-03-03
Payer: COMMERCIAL

## 2021-03-03 VITALS — BODY MASS INDEX: 38.36 KG/M2 | HEIGHT: 76 IN | WEIGHT: 315 LBS

## 2021-03-03 DIAGNOSIS — M25.861 PATELLOFEMORAL DYSFUNCTION, RIGHT: ICD-10-CM

## 2021-03-03 DIAGNOSIS — M25.561 RIGHT KNEE PAIN, UNSPECIFIED CHRONICITY: ICD-10-CM

## 2021-03-03 DIAGNOSIS — M25.862 PATELLOFEMORAL DYSFUNCTION, LEFT: ICD-10-CM

## 2021-03-03 DIAGNOSIS — G89.29 BILATERAL CHRONIC KNEE PAIN: Primary | ICD-10-CM

## 2021-03-03 DIAGNOSIS — M25.562 BILATERAL CHRONIC KNEE PAIN: Primary | ICD-10-CM

## 2021-03-03 DIAGNOSIS — M25.561 BILATERAL CHRONIC KNEE PAIN: Primary | ICD-10-CM

## 2021-03-03 DIAGNOSIS — M17.0 PRIMARY OSTEOARTHRITIS OF BOTH KNEES: ICD-10-CM

## 2021-03-03 PROCEDURE — 99214 OFFICE O/P EST MOD 30 MIN: CPT | Mod: S$GLB,,, | Performed by: FAMILY MEDICINE

## 2021-03-03 PROCEDURE — 73564 X-RAY EXAM KNEE 4 OR MORE: CPT | Mod: 26,,, | Performed by: RADIOLOGY

## 2021-03-03 PROCEDURE — 1125F AMNT PAIN NOTED PAIN PRSNT: CPT | Mod: S$GLB,,, | Performed by: FAMILY MEDICINE

## 2021-03-03 PROCEDURE — 73564 X-RAY EXAM KNEE 4 OR MORE: CPT | Mod: TC,50

## 2021-03-03 PROCEDURE — 1125F PR PAIN SEVERITY QUANTIFIED, PAIN PRESENT: ICD-10-PCS | Mod: S$GLB,,, | Performed by: FAMILY MEDICINE

## 2021-03-03 PROCEDURE — 99214 PR OFFICE/OUTPT VISIT, EST, LEVL IV, 30-39 MIN: ICD-10-PCS | Mod: S$GLB,,, | Performed by: FAMILY MEDICINE

## 2021-03-03 PROCEDURE — 73564 XR KNEE ORTHO BILAT WITH FLEXION: ICD-10-PCS | Mod: 26,,, | Performed by: RADIOLOGY

## 2021-03-03 PROCEDURE — 99999 PR PBB SHADOW E&M-EST. PATIENT-LVL III: CPT | Mod: PBBFAC,,, | Performed by: FAMILY MEDICINE

## 2021-03-03 PROCEDURE — 3008F PR BODY MASS INDEX (BMI) DOCUMENTED: ICD-10-PCS | Mod: CPTII,S$GLB,, | Performed by: FAMILY MEDICINE

## 2021-03-03 PROCEDURE — 99999 PR PBB SHADOW E&M-EST. PATIENT-LVL III: ICD-10-PCS | Mod: PBBFAC,,, | Performed by: FAMILY MEDICINE

## 2021-03-03 PROCEDURE — 3008F BODY MASS INDEX DOCD: CPT | Mod: CPTII,S$GLB,, | Performed by: FAMILY MEDICINE

## 2021-03-11 ENCOUNTER — TELEPHONE (OUTPATIENT)
Dept: SPORTS MEDICINE | Facility: CLINIC | Age: 36
End: 2021-03-11

## 2021-03-11 DIAGNOSIS — M25.861 PATELLOFEMORAL DYSFUNCTION, RIGHT: Primary | ICD-10-CM

## 2021-03-17 ENCOUNTER — PATIENT MESSAGE (OUTPATIENT)
Dept: SPORTS MEDICINE | Facility: CLINIC | Age: 36
End: 2021-03-17

## 2021-04-01 ENCOUNTER — TELEPHONE (OUTPATIENT)
Dept: SPORTS MEDICINE | Facility: CLINIC | Age: 36
End: 2021-04-01

## 2021-04-01 ENCOUNTER — PATIENT MESSAGE (OUTPATIENT)
Dept: DERMATOLOGY | Facility: CLINIC | Age: 36
End: 2021-04-01

## 2021-04-01 ENCOUNTER — PATIENT MESSAGE (OUTPATIENT)
Dept: SPORTS MEDICINE | Facility: CLINIC | Age: 36
End: 2021-04-01

## 2021-04-01 ENCOUNTER — PATIENT MESSAGE (OUTPATIENT)
Dept: INTERNAL MEDICINE | Facility: CLINIC | Age: 36
End: 2021-04-01

## 2021-04-01 DIAGNOSIS — B00.9 HSV INFECTION: Primary | ICD-10-CM

## 2021-04-01 DIAGNOSIS — D84.9 IMMUNOSUPPRESSION: ICD-10-CM

## 2021-04-02 RX ORDER — VALACYCLOVIR HYDROCHLORIDE 500 MG/1
500 TABLET, FILM COATED ORAL DAILY
Qty: 30 TABLET | Refills: 0 | Status: SHIPPED | OUTPATIENT
Start: 2021-04-02 | End: 2021-10-06

## 2021-04-05 RX ORDER — VALACYCLOVIR HYDROCHLORIDE 1 G/1
TABLET, FILM COATED ORAL
Qty: 12 TABLET | Refills: 0 | Status: SHIPPED | OUTPATIENT
Start: 2021-04-05

## 2021-04-07 ENCOUNTER — PATIENT MESSAGE (OUTPATIENT)
Dept: PULMONOLOGY | Facility: CLINIC | Age: 36
End: 2021-04-07

## 2021-04-07 DIAGNOSIS — G47.33 OSA (OBSTRUCTIVE SLEEP APNEA): Primary | ICD-10-CM

## 2021-04-16 ENCOUNTER — PATIENT MESSAGE (OUTPATIENT)
Dept: INTERNAL MEDICINE | Facility: CLINIC | Age: 36
End: 2021-04-16

## 2021-04-16 RX ORDER — LOSARTAN POTASSIUM 50 MG/1
50 TABLET ORAL DAILY
Qty: 90 TABLET | Refills: 0 | Status: SHIPPED | OUTPATIENT
Start: 2021-04-16 | End: 2021-04-20 | Stop reason: SDUPTHER

## 2021-04-16 RX ORDER — LOSARTAN POTASSIUM 50 MG/1
50 TABLET ORAL DAILY
Qty: 90 TABLET | Refills: 0 | Status: SHIPPED | OUTPATIENT
Start: 2021-04-16 | End: 2021-04-16 | Stop reason: SDUPTHER

## 2021-04-20 ENCOUNTER — OFFICE VISIT (OUTPATIENT)
Dept: PULMONOLOGY | Facility: CLINIC | Age: 36
End: 2021-04-20
Payer: COMMERCIAL

## 2021-04-20 VITALS
TEMPERATURE: 98 F | OXYGEN SATURATION: 96 % | BODY MASS INDEX: 38.36 KG/M2 | HEART RATE: 101 BPM | DIASTOLIC BLOOD PRESSURE: 91 MMHG | SYSTOLIC BLOOD PRESSURE: 138 MMHG | WEIGHT: 315 LBS | HEIGHT: 76 IN

## 2021-04-20 DIAGNOSIS — G47.33 OBSTRUCTIVE SLEEP APNEA TREATED WITH CONTINUOUS POSITIVE AIRWAY PRESSURE (CPAP): ICD-10-CM

## 2021-04-20 PROCEDURE — 99999 PR PBB SHADOW E&M-EST. PATIENT-LVL V: ICD-10-PCS | Mod: PBBFAC,,, | Performed by: INTERNAL MEDICINE

## 2021-04-20 PROCEDURE — 99999 PR PBB SHADOW E&M-EST. PATIENT-LVL V: CPT | Mod: PBBFAC,,, | Performed by: INTERNAL MEDICINE

## 2021-04-20 PROCEDURE — 1126F PR PAIN SEVERITY QUANTIFIED, NO PAIN PRESENT: ICD-10-PCS | Mod: S$GLB,,, | Performed by: INTERNAL MEDICINE

## 2021-04-20 PROCEDURE — 3008F PR BODY MASS INDEX (BMI) DOCUMENTED: ICD-10-PCS | Mod: CPTII,S$GLB,, | Performed by: INTERNAL MEDICINE

## 2021-04-20 PROCEDURE — 1126F AMNT PAIN NOTED NONE PRSNT: CPT | Mod: S$GLB,,, | Performed by: INTERNAL MEDICINE

## 2021-04-20 PROCEDURE — 99213 PR OFFICE/OUTPT VISIT, EST, LEVL III, 20-29 MIN: ICD-10-PCS | Mod: S$GLB,,, | Performed by: INTERNAL MEDICINE

## 2021-04-20 PROCEDURE — 99213 OFFICE O/P EST LOW 20 MIN: CPT | Mod: S$GLB,,, | Performed by: INTERNAL MEDICINE

## 2021-04-20 PROCEDURE — 3008F BODY MASS INDEX DOCD: CPT | Mod: CPTII,S$GLB,, | Performed by: INTERNAL MEDICINE

## 2021-04-20 RX ORDER — LOSARTAN POTASSIUM 50 MG/1
50 TABLET ORAL DAILY
Qty: 90 TABLET | Refills: 0 | Status: SHIPPED | OUTPATIENT
Start: 2021-04-20 | End: 2021-04-30

## 2021-04-20 RX ORDER — APIXABAN 5 MG/1
5 TABLET, FILM COATED ORAL 2 TIMES DAILY
COMMUNITY
Start: 2021-04-07

## 2021-04-21 ENCOUNTER — OFFICE VISIT (OUTPATIENT)
Dept: INTERNAL MEDICINE | Facility: CLINIC | Age: 36
End: 2021-04-21
Payer: COMMERCIAL

## 2021-04-21 ENCOUNTER — LAB VISIT (OUTPATIENT)
Dept: LAB | Facility: HOSPITAL | Age: 36
End: 2021-04-21
Attending: INTERNAL MEDICINE
Payer: COMMERCIAL

## 2021-04-21 VITALS
HEART RATE: 98 BPM | DIASTOLIC BLOOD PRESSURE: 84 MMHG | HEIGHT: 76 IN | OXYGEN SATURATION: 98 % | WEIGHT: 315 LBS | SYSTOLIC BLOOD PRESSURE: 138 MMHG | BODY MASS INDEX: 38.36 KG/M2

## 2021-04-21 DIAGNOSIS — Z91.89 AT INCREASED RISK OF EXPOSURE TO COVID-19 VIRUS: ICD-10-CM

## 2021-04-21 DIAGNOSIS — Z91.89 INCREASED INFECTION RISK: Primary | ICD-10-CM

## 2021-04-21 DIAGNOSIS — D61.9 APLASTIC ANEMIA: Primary | ICD-10-CM

## 2021-04-21 DIAGNOSIS — F33.2 SEVERE EPISODE OF RECURRENT MAJOR DEPRESSIVE DISORDER, WITHOUT PSYCHOTIC FEATURES: ICD-10-CM

## 2021-04-21 DIAGNOSIS — E66.01 SEVERE OBESITY (BMI 35.0-39.9) WITH COMORBIDITY: ICD-10-CM

## 2021-04-21 DIAGNOSIS — D59.5 PAROXYSMAL NOCTURNAL HEMOGLOBINURIA (PNH): ICD-10-CM

## 2021-04-21 PROCEDURE — 3008F PR BODY MASS INDEX (BMI) DOCUMENTED: ICD-10-PCS | Mod: CPTII,S$GLB,, | Performed by: INTERNAL MEDICINE

## 2021-04-21 PROCEDURE — 1126F AMNT PAIN NOTED NONE PRSNT: CPT | Mod: S$GLB,,, | Performed by: INTERNAL MEDICINE

## 2021-04-21 PROCEDURE — 1126F PR PAIN SEVERITY QUANTIFIED, NO PAIN PRESENT: ICD-10-PCS | Mod: S$GLB,,, | Performed by: INTERNAL MEDICINE

## 2021-04-21 PROCEDURE — 3008F BODY MASS INDEX DOCD: CPT | Mod: CPTII,S$GLB,, | Performed by: INTERNAL MEDICINE

## 2021-04-21 PROCEDURE — 36415 COLL VENOUS BLD VENIPUNCTURE: CPT | Performed by: INTERNAL MEDICINE

## 2021-04-21 PROCEDURE — 99999 PR PBB SHADOW E&M-EST. PATIENT-LVL V: CPT | Mod: PBBFAC,,, | Performed by: INTERNAL MEDICINE

## 2021-04-21 PROCEDURE — 99213 OFFICE O/P EST LOW 20 MIN: CPT | Mod: S$GLB,,, | Performed by: INTERNAL MEDICINE

## 2021-04-21 PROCEDURE — 99213 PR OFFICE/OUTPT VISIT, EST, LEVL III, 20-29 MIN: ICD-10-PCS | Mod: S$GLB,,, | Performed by: INTERNAL MEDICINE

## 2021-04-21 PROCEDURE — 86769 SARS-COV-2 COVID-19 ANTIBODY: CPT | Performed by: INTERNAL MEDICINE

## 2021-04-21 PROCEDURE — 99999 PR PBB SHADOW E&M-EST. PATIENT-LVL V: ICD-10-PCS | Mod: PBBFAC,,, | Performed by: INTERNAL MEDICINE

## 2021-04-22 ENCOUNTER — PATIENT MESSAGE (OUTPATIENT)
Dept: INTERNAL MEDICINE | Facility: CLINIC | Age: 36
End: 2021-04-22

## 2021-04-22 LAB
SARS-COV-2 IGG SERPL IA-ACNC: 3424.9 AU/ML
SARS-COV-2 IGG SERPL QL IA: POSITIVE

## 2021-04-28 ENCOUNTER — PATIENT MESSAGE (OUTPATIENT)
Dept: INTERNAL MEDICINE | Facility: CLINIC | Age: 36
End: 2021-04-28

## 2021-04-30 RX ORDER — LOSARTAN POTASSIUM 100 MG/1
100 TABLET ORAL DAILY
Qty: 90 TABLET | Refills: 0 | Status: SHIPPED | OUTPATIENT
Start: 2021-04-30 | End: 2021-07-06 | Stop reason: SDUPTHER

## 2021-05-04 ENCOUNTER — OFFICE VISIT (OUTPATIENT)
Dept: OTOLARYNGOLOGY | Facility: CLINIC | Age: 36
End: 2021-05-04
Payer: COMMERCIAL

## 2021-05-04 VITALS
BODY MASS INDEX: 40.76 KG/M2 | SYSTOLIC BLOOD PRESSURE: 139 MMHG | DIASTOLIC BLOOD PRESSURE: 75 MMHG | WEIGHT: 315 LBS | TEMPERATURE: 98 F | HEART RATE: 81 BPM

## 2021-05-04 DIAGNOSIS — J34.2 NASAL SEPTAL DEVIATION: ICD-10-CM

## 2021-05-04 DIAGNOSIS — H61.23 BILATERAL IMPACTED CERUMEN: Primary | ICD-10-CM

## 2021-05-04 DIAGNOSIS — G47.33 OBSTRUCTIVE SLEEP APNEA TREATED WITH CONTINUOUS POSITIVE AIRWAY PRESSURE (CPAP): ICD-10-CM

## 2021-05-04 DIAGNOSIS — J30.9 ALLERGIC RHINITIS, UNSPECIFIED SEASONALITY, UNSPECIFIED TRIGGER: ICD-10-CM

## 2021-05-04 DIAGNOSIS — H69.93 DYSFUNCTION OF BOTH EUSTACHIAN TUBES: ICD-10-CM

## 2021-05-04 PROCEDURE — 99214 OFFICE O/P EST MOD 30 MIN: CPT | Mod: 25,S$GLB,, | Performed by: SPECIALIST

## 2021-05-04 PROCEDURE — 69210 REMOVE IMPACTED EAR WAX UNI: CPT | Mod: S$GLB,,, | Performed by: SPECIALIST

## 2021-05-04 PROCEDURE — 99999 PR PBB SHADOW E&M-EST. PATIENT-LVL IV: ICD-10-PCS | Mod: PBBFAC,,, | Performed by: SPECIALIST

## 2021-05-04 PROCEDURE — 3008F PR BODY MASS INDEX (BMI) DOCUMENTED: ICD-10-PCS | Mod: CPTII,S$GLB,, | Performed by: SPECIALIST

## 2021-05-04 PROCEDURE — 3008F BODY MASS INDEX DOCD: CPT | Mod: CPTII,S$GLB,, | Performed by: SPECIALIST

## 2021-05-04 PROCEDURE — 69210 EAR CERUMEN REMOVAL: ICD-10-PCS | Mod: S$GLB,,, | Performed by: SPECIALIST

## 2021-05-04 PROCEDURE — 99214 PR OFFICE/OUTPT VISIT, EST, LEVL IV, 30-39 MIN: ICD-10-PCS | Mod: 25,S$GLB,, | Performed by: SPECIALIST

## 2021-05-04 PROCEDURE — 1126F PR PAIN SEVERITY QUANTIFIED, NO PAIN PRESENT: ICD-10-PCS | Mod: S$GLB,,, | Performed by: SPECIALIST

## 2021-05-04 PROCEDURE — 99999 PR PBB SHADOW E&M-EST. PATIENT-LVL IV: CPT | Mod: PBBFAC,,, | Performed by: SPECIALIST

## 2021-05-04 PROCEDURE — 1126F AMNT PAIN NOTED NONE PRSNT: CPT | Mod: S$GLB,,, | Performed by: SPECIALIST

## 2021-05-04 RX ORDER — AZELASTINE HYDROCHLORIDE, FLUTICASONE PROPIONATE 137; 50 UG/1; UG/1
1 SPRAY, METERED NASAL 2 TIMES DAILY
Qty: 23 G | Refills: 11 | Status: SHIPPED | OUTPATIENT
Start: 2021-05-04 | End: 2021-05-13 | Stop reason: SDUPTHER

## 2021-05-14 ENCOUNTER — TELEPHONE (OUTPATIENT)
Dept: OTOLARYNGOLOGY | Facility: CLINIC | Age: 36
End: 2021-05-14

## 2021-05-14 RX ORDER — AZELASTINE HYDROCHLORIDE, FLUTICASONE PROPIONATE 137; 50 UG/1; UG/1
1 SPRAY, METERED NASAL 2 TIMES DAILY
Qty: 23 G | Refills: 11 | Status: SHIPPED | OUTPATIENT
Start: 2021-05-14 | End: 2021-05-21 | Stop reason: CLARIF

## 2021-05-21 ENCOUNTER — PATIENT MESSAGE (OUTPATIENT)
Dept: OTOLARYNGOLOGY | Facility: CLINIC | Age: 36
End: 2021-05-21

## 2021-05-21 ENCOUNTER — PATIENT MESSAGE (OUTPATIENT)
Dept: SPORTS MEDICINE | Facility: CLINIC | Age: 36
End: 2021-05-21

## 2021-05-21 RX ORDER — AZELASTINE 1 MG/ML
SPRAY, METERED NASAL
Qty: 30 ML | Refills: 11 | Status: SHIPPED | OUTPATIENT
Start: 2021-05-21 | End: 2022-05-27 | Stop reason: SDUPTHER

## 2021-05-21 RX ORDER — FLUTICASONE PROPIONATE 50 MCG
SPRAY, SUSPENSION (ML) NASAL
Qty: 18.2 ML | Refills: 11 | Status: SHIPPED | OUTPATIENT
Start: 2021-05-21 | End: 2022-06-24 | Stop reason: SDUPTHER

## 2021-06-07 ENCOUNTER — PATIENT MESSAGE (OUTPATIENT)
Dept: GASTROENTEROLOGY | Facility: CLINIC | Age: 36
End: 2021-06-07

## 2021-06-07 ENCOUNTER — TELEPHONE (OUTPATIENT)
Dept: GASTROENTEROLOGY | Facility: CLINIC | Age: 36
End: 2021-06-07

## 2021-06-08 ENCOUNTER — TELEPHONE (OUTPATIENT)
Dept: ENDOSCOPY | Facility: HOSPITAL | Age: 36
End: 2021-06-08

## 2021-06-08 ENCOUNTER — OFFICE VISIT (OUTPATIENT)
Dept: SURGERY | Facility: CLINIC | Age: 36
End: 2021-06-08
Payer: COMMERCIAL

## 2021-06-08 ENCOUNTER — PATIENT MESSAGE (OUTPATIENT)
Dept: ENDOSCOPY | Facility: HOSPITAL | Age: 36
End: 2021-06-08

## 2021-06-08 VITALS
BODY MASS INDEX: 38.36 KG/M2 | HEIGHT: 76 IN | HEART RATE: 90 BPM | SYSTOLIC BLOOD PRESSURE: 147 MMHG | DIASTOLIC BLOOD PRESSURE: 83 MMHG | WEIGHT: 315 LBS

## 2021-06-08 DIAGNOSIS — K62.5 RECTAL BLEEDING: Primary | ICD-10-CM

## 2021-06-08 DIAGNOSIS — Z12.11 SPECIAL SCREENING FOR MALIGNANT NEOPLASMS, COLON: Primary | ICD-10-CM

## 2021-06-08 PROCEDURE — 99204 PR OFFICE/OUTPT VISIT, NEW, LEVL IV, 45-59 MIN: ICD-10-PCS | Mod: S$GLB,,, | Performed by: STUDENT IN AN ORGANIZED HEALTH CARE EDUCATION/TRAINING PROGRAM

## 2021-06-08 PROCEDURE — 3008F BODY MASS INDEX DOCD: CPT | Mod: CPTII,S$GLB,, | Performed by: STUDENT IN AN ORGANIZED HEALTH CARE EDUCATION/TRAINING PROGRAM

## 2021-06-08 PROCEDURE — 3008F PR BODY MASS INDEX (BMI) DOCUMENTED: ICD-10-PCS | Mod: CPTII,S$GLB,, | Performed by: STUDENT IN AN ORGANIZED HEALTH CARE EDUCATION/TRAINING PROGRAM

## 2021-06-08 PROCEDURE — 99999 PR PBB SHADOW E&M-EST. PATIENT-LVL IV: CPT | Mod: PBBFAC,,, | Performed by: STUDENT IN AN ORGANIZED HEALTH CARE EDUCATION/TRAINING PROGRAM

## 2021-06-08 PROCEDURE — 1126F AMNT PAIN NOTED NONE PRSNT: CPT | Mod: S$GLB,,, | Performed by: STUDENT IN AN ORGANIZED HEALTH CARE EDUCATION/TRAINING PROGRAM

## 2021-06-08 PROCEDURE — 1126F PR PAIN SEVERITY QUANTIFIED, NO PAIN PRESENT: ICD-10-PCS | Mod: S$GLB,,, | Performed by: STUDENT IN AN ORGANIZED HEALTH CARE EDUCATION/TRAINING PROGRAM

## 2021-06-08 PROCEDURE — 99999 PR PBB SHADOW E&M-EST. PATIENT-LVL IV: ICD-10-PCS | Mod: PBBFAC,,, | Performed by: STUDENT IN AN ORGANIZED HEALTH CARE EDUCATION/TRAINING PROGRAM

## 2021-06-08 PROCEDURE — 99204 OFFICE O/P NEW MOD 45 MIN: CPT | Mod: S$GLB,,, | Performed by: STUDENT IN AN ORGANIZED HEALTH CARE EDUCATION/TRAINING PROGRAM

## 2021-06-08 RX ORDER — HYDROCORTISONE 25 MG/G
CREAM TOPICAL 2 TIMES DAILY
Qty: 20 G | Refills: 0 | Status: SHIPPED | OUTPATIENT
Start: 2021-06-08

## 2021-06-08 RX ORDER — SODIUM, POTASSIUM,MAG SULFATES 17.5-3.13G
1 SOLUTION, RECONSTITUTED, ORAL ORAL ONCE
Qty: 1 KIT | Refills: 0 | Status: SHIPPED | OUTPATIENT
Start: 2021-06-08 | End: 2021-06-08

## 2021-07-06 ENCOUNTER — TELEPHONE (OUTPATIENT)
Dept: ENDOSCOPY | Facility: HOSPITAL | Age: 36
End: 2021-07-06

## 2021-07-06 ENCOUNTER — LAB VISIT (OUTPATIENT)
Dept: LAB | Facility: HOSPITAL | Age: 36
End: 2021-07-06
Attending: INTERNAL MEDICINE
Payer: COMMERCIAL

## 2021-07-06 ENCOUNTER — OFFICE VISIT (OUTPATIENT)
Dept: INTERNAL MEDICINE | Facility: CLINIC | Age: 36
End: 2021-07-06
Payer: COMMERCIAL

## 2021-07-06 DIAGNOSIS — E66.9 OBESITY, UNSPECIFIED CLASSIFICATION, UNSPECIFIED OBESITY TYPE, UNSPECIFIED WHETHER SERIOUS COMORBIDITY PRESENT: Primary | ICD-10-CM

## 2021-07-06 DIAGNOSIS — I10 ESSENTIAL HYPERTENSION: ICD-10-CM

## 2021-07-06 DIAGNOSIS — F98.8 ATTENTION DEFICIT DISORDER, UNSPECIFIED HYPERACTIVITY PRESENCE: ICD-10-CM

## 2021-07-06 PROCEDURE — 99999 PR PBB SHADOW E&M-EST. PATIENT-LVL V: ICD-10-PCS | Mod: PBBFAC,,, | Performed by: INTERNAL MEDICINE

## 2021-07-06 PROCEDURE — 3074F PR MOST RECENT SYSTOLIC BLOOD PRESSURE < 130 MM HG: ICD-10-PCS | Mod: CPTII,S$GLB,, | Performed by: INTERNAL MEDICINE

## 2021-07-06 PROCEDURE — 3079F DIAST BP 80-89 MM HG: CPT | Mod: CPTII,S$GLB,, | Performed by: INTERNAL MEDICINE

## 2021-07-06 PROCEDURE — 80053 COMPREHEN METABOLIC PANEL: CPT | Performed by: INTERNAL MEDICINE

## 2021-07-06 PROCEDURE — 99999 PR PBB SHADOW E&M-EST. PATIENT-LVL V: CPT | Mod: PBBFAC,,, | Performed by: INTERNAL MEDICINE

## 2021-07-06 PROCEDURE — 3008F PR BODY MASS INDEX (BMI) DOCUMENTED: ICD-10-PCS | Mod: CPTII,S$GLB,, | Performed by: INTERNAL MEDICINE

## 2021-07-06 PROCEDURE — 3008F BODY MASS INDEX DOCD: CPT | Mod: CPTII,S$GLB,, | Performed by: INTERNAL MEDICINE

## 2021-07-06 PROCEDURE — 36415 COLL VENOUS BLD VENIPUNCTURE: CPT | Performed by: INTERNAL MEDICINE

## 2021-07-06 PROCEDURE — 99214 OFFICE O/P EST MOD 30 MIN: CPT | Mod: S$GLB,,, | Performed by: INTERNAL MEDICINE

## 2021-07-06 PROCEDURE — 1126F AMNT PAIN NOTED NONE PRSNT: CPT | Mod: S$GLB,,, | Performed by: INTERNAL MEDICINE

## 2021-07-06 PROCEDURE — 3079F PR MOST RECENT DIASTOLIC BLOOD PRESSURE 80-89 MM HG: ICD-10-PCS | Mod: CPTII,S$GLB,, | Performed by: INTERNAL MEDICINE

## 2021-07-06 PROCEDURE — 1126F PR PAIN SEVERITY QUANTIFIED, NO PAIN PRESENT: ICD-10-PCS | Mod: S$GLB,,, | Performed by: INTERNAL MEDICINE

## 2021-07-06 PROCEDURE — 99214 PR OFFICE/OUTPT VISIT, EST, LEVL IV, 30-39 MIN: ICD-10-PCS | Mod: S$GLB,,, | Performed by: INTERNAL MEDICINE

## 2021-07-06 PROCEDURE — 3074F SYST BP LT 130 MM HG: CPT | Mod: CPTII,S$GLB,, | Performed by: INTERNAL MEDICINE

## 2021-07-06 RX ORDER — DICYCLOMINE HYDROCHLORIDE 20 MG/1
20 TABLET ORAL 4 TIMES DAILY PRN
COMMUNITY
Start: 2021-05-28

## 2021-07-06 RX ORDER — BUPROPION HYDROCHLORIDE 150 MG/1
150 TABLET ORAL EVERY MORNING
COMMUNITY
Start: 2021-06-16

## 2021-07-06 RX ORDER — LOSARTAN POTASSIUM 100 MG/1
100 TABLET ORAL DAILY
Qty: 90 TABLET | Refills: 1 | Status: SHIPPED | OUTPATIENT
Start: 2021-07-06 | End: 2022-01-11

## 2021-07-06 RX ORDER — AMLODIPINE BESYLATE 10 MG/1
10 TABLET ORAL DAILY
Qty: 90 TABLET | Refills: 1 | Status: SHIPPED | OUTPATIENT
Start: 2021-07-06 | End: 2022-01-08

## 2021-07-07 LAB
ALBUMIN SERPL BCP-MCNC: 3.9 G/DL (ref 3.5–5.2)
ALP SERPL-CCNC: 71 U/L (ref 55–135)
ALT SERPL W/O P-5'-P-CCNC: 19 U/L (ref 10–44)
ANION GAP SERPL CALC-SCNC: 10 MMOL/L (ref 8–16)
AST SERPL-CCNC: 19 U/L (ref 10–40)
BILIRUB SERPL-MCNC: 0.4 MG/DL (ref 0.1–1)
BUN SERPL-MCNC: 22 MG/DL (ref 6–20)
CALCIUM SERPL-MCNC: 9.3 MG/DL (ref 8.7–10.5)
CHLORIDE SERPL-SCNC: 107 MMOL/L (ref 95–110)
CO2 SERPL-SCNC: 22 MMOL/L (ref 23–29)
CREAT SERPL-MCNC: 1.4 MG/DL (ref 0.5–1.4)
EST. GFR  (AFRICAN AMERICAN): >60 ML/MIN/1.73 M^2
EST. GFR  (NON AFRICAN AMERICAN): >60 ML/MIN/1.73 M^2
GLUCOSE SERPL-MCNC: 92 MG/DL (ref 70–110)
POTASSIUM SERPL-SCNC: 4.2 MMOL/L (ref 3.5–5.1)
PROT SERPL-MCNC: 7.5 G/DL (ref 6–8.4)
SODIUM SERPL-SCNC: 139 MMOL/L (ref 136–145)

## 2021-07-11 VITALS
HEART RATE: 76 BPM | WEIGHT: 315 LBS | BODY MASS INDEX: 38.36 KG/M2 | DIASTOLIC BLOOD PRESSURE: 82 MMHG | HEIGHT: 76 IN | TEMPERATURE: 99 F | OXYGEN SATURATION: 95 % | SYSTOLIC BLOOD PRESSURE: 120 MMHG

## 2021-07-13 ENCOUNTER — TELEPHONE (OUTPATIENT)
Dept: PULMONOLOGY | Facility: CLINIC | Age: 36
End: 2021-07-13

## 2021-07-13 ENCOUNTER — PATIENT MESSAGE (OUTPATIENT)
Dept: PULMONOLOGY | Facility: CLINIC | Age: 36
End: 2021-07-13

## 2021-07-14 ENCOUNTER — TELEPHONE (OUTPATIENT)
Dept: PULMONOLOGY | Facility: CLINIC | Age: 36
End: 2021-07-14

## 2021-07-14 DIAGNOSIS — G47.33 OSA (OBSTRUCTIVE SLEEP APNEA): Primary | ICD-10-CM

## 2021-07-21 ENCOUNTER — ANESTHESIA (OUTPATIENT)
Dept: ENDOSCOPY | Facility: HOSPITAL | Age: 36
End: 2021-07-21
Payer: COMMERCIAL

## 2021-07-21 ENCOUNTER — HOSPITAL ENCOUNTER (OUTPATIENT)
Facility: HOSPITAL | Age: 36
Discharge: HOME | End: 2021-07-21
Attending: STUDENT IN AN ORGANIZED HEALTH CARE EDUCATION/TRAINING PROGRAM | Admitting: STUDENT IN AN ORGANIZED HEALTH CARE EDUCATION/TRAINING PROGRAM
Payer: COMMERCIAL

## 2021-07-21 ENCOUNTER — ANESTHESIA EVENT (OUTPATIENT)
Dept: ENDOSCOPY | Facility: HOSPITAL | Age: 36
End: 2021-07-21
Payer: COMMERCIAL

## 2021-07-21 VITALS
OXYGEN SATURATION: 100 % | BODY MASS INDEX: 38.36 KG/M2 | RESPIRATION RATE: 14 BRPM | HEART RATE: 66 BPM | HEIGHT: 76 IN | DIASTOLIC BLOOD PRESSURE: 62 MMHG | WEIGHT: 315 LBS | SYSTOLIC BLOOD PRESSURE: 124 MMHG | TEMPERATURE: 98 F

## 2021-07-21 DIAGNOSIS — Z12.11 SCREENING FOR COLON CANCER: Primary | ICD-10-CM

## 2021-07-21 PROCEDURE — 45385 COLONOSCOPY W/LESION REMOVAL: CPT | Mod: ,,, | Performed by: STUDENT IN AN ORGANIZED HEALTH CARE EDUCATION/TRAINING PROGRAM

## 2021-07-21 PROCEDURE — E9220 PRA ENDO ANESTHESIA: HCPCS | Mod: ,,, | Performed by: NURSE ANESTHETIST, CERTIFIED REGISTERED

## 2021-07-21 PROCEDURE — 45385 COLONOSCOPY W/LESION REMOVAL: CPT | Performed by: STUDENT IN AN ORGANIZED HEALTH CARE EDUCATION/TRAINING PROGRAM

## 2021-07-21 PROCEDURE — 37000009 HC ANESTHESIA EA ADD 15 MINS: Performed by: STUDENT IN AN ORGANIZED HEALTH CARE EDUCATION/TRAINING PROGRAM

## 2021-07-21 PROCEDURE — 63600175 PHARM REV CODE 636 W HCPCS: Performed by: NURSE ANESTHETIST, CERTIFIED REGISTERED

## 2021-07-21 PROCEDURE — 45385 PR COLONOSCOPY,REMV LESN,SNARE: ICD-10-PCS | Mod: ,,, | Performed by: STUDENT IN AN ORGANIZED HEALTH CARE EDUCATION/TRAINING PROGRAM

## 2021-07-21 PROCEDURE — 25000003 PHARM REV CODE 250: Performed by: STUDENT IN AN ORGANIZED HEALTH CARE EDUCATION/TRAINING PROGRAM

## 2021-07-21 PROCEDURE — E9220 PRA ENDO ANESTHESIA: ICD-10-PCS | Mod: ,,, | Performed by: NURSE ANESTHETIST, CERTIFIED REGISTERED

## 2021-07-21 PROCEDURE — 88305 TISSUE EXAM BY PATHOLOGIST: CPT | Mod: 26,,, | Performed by: PATHOLOGY

## 2021-07-21 PROCEDURE — 37000008 HC ANESTHESIA 1ST 15 MINUTES: Performed by: STUDENT IN AN ORGANIZED HEALTH CARE EDUCATION/TRAINING PROGRAM

## 2021-07-21 PROCEDURE — 88305 TISSUE EXAM BY PATHOLOGIST: ICD-10-PCS | Mod: 26,,, | Performed by: PATHOLOGY

## 2021-07-21 PROCEDURE — 27201089 HC SNARE, DISP (ANY): Performed by: STUDENT IN AN ORGANIZED HEALTH CARE EDUCATION/TRAINING PROGRAM

## 2021-07-21 PROCEDURE — 88305 TISSUE EXAM BY PATHOLOGIST: CPT | Performed by: PATHOLOGY

## 2021-07-21 RX ORDER — SODIUM CHLORIDE 9 MG/ML
INJECTION, SOLUTION INTRAVENOUS CONTINUOUS
Status: DISCONTINUED | OUTPATIENT
Start: 2021-07-21 | End: 2021-07-21 | Stop reason: HOSPADM

## 2021-07-21 RX ORDER — PROPOFOL 10 MG/ML
VIAL (ML) INTRAVENOUS CONTINUOUS PRN
Status: DISCONTINUED | OUTPATIENT
Start: 2021-07-21 | End: 2021-07-21

## 2021-07-21 RX ORDER — LIDOCAINE HCL/PF 100 MG/5ML
SYRINGE (ML) INTRAVENOUS
Status: DISCONTINUED | OUTPATIENT
Start: 2021-07-21 | End: 2021-07-21

## 2021-07-21 RX ORDER — PHENYLEPHRINE HYDROCHLORIDE 10 MG/ML
INJECTION INTRAVENOUS
Status: DISCONTINUED | OUTPATIENT
Start: 2021-07-21 | End: 2021-07-21

## 2021-07-21 RX ORDER — PROPOFOL 10 MG/ML
VIAL (ML) INTRAVENOUS
Status: DISCONTINUED | OUTPATIENT
Start: 2021-07-21 | End: 2021-07-21

## 2021-07-21 RX ADMIN — SODIUM CHLORIDE: 0.9 INJECTION, SOLUTION INTRAVENOUS at 09:07

## 2021-07-21 RX ADMIN — Medication 50 MG: at 09:07

## 2021-07-21 RX ADMIN — PHENYLEPHRINE HYDROCHLORIDE 200 MCG: 10 INJECTION INTRAVENOUS at 09:07

## 2021-07-21 RX ADMIN — Medication 100 MG: at 09:07

## 2021-07-21 RX ADMIN — PROPOFOL 150 MCG/KG/MIN: 10 INJECTION, EMULSION INTRAVENOUS at 09:07

## 2021-07-29 ENCOUNTER — PATIENT MESSAGE (OUTPATIENT)
Dept: SURGERY | Facility: CLINIC | Age: 36
End: 2021-07-29

## 2021-07-29 LAB
FINAL PATHOLOGIC DIAGNOSIS: NORMAL
Lab: NORMAL

## 2021-08-13 ENCOUNTER — PATIENT MESSAGE (OUTPATIENT)
Dept: INTERNAL MEDICINE | Facility: CLINIC | Age: 36
End: 2021-08-13

## 2021-08-25 ENCOUNTER — OFFICE VISIT (OUTPATIENT)
Dept: DERMATOLOGY | Facility: CLINIC | Age: 36
End: 2021-08-25
Payer: COMMERCIAL

## 2021-08-25 DIAGNOSIS — L72.0 EIC (EPIDERMAL INCLUSION CYST): ICD-10-CM

## 2021-08-25 DIAGNOSIS — L21.9 ACUTE SEBORRHEIC DERMATITIS: Primary | ICD-10-CM

## 2021-08-25 DIAGNOSIS — Z12.83 SKIN CANCER SCREENING: ICD-10-CM

## 2021-08-25 DIAGNOSIS — L64.9 ANDROGENETIC ALOPECIA: ICD-10-CM

## 2021-08-25 DIAGNOSIS — L24.4 IRRITANT CONTACT DERMATITIS DUE TO DRUG IN CONTACT WITH SKIN: ICD-10-CM

## 2021-08-25 DIAGNOSIS — D22.9 MULTIPLE BENIGN NEVI: ICD-10-CM

## 2021-08-25 PROCEDURE — 1160F PR REVIEW ALL MEDS BY PRESCRIBER/CLIN PHARMACIST DOCUMENTED: ICD-10-PCS | Mod: CPTII,S$GLB,, | Performed by: DERMATOLOGY

## 2021-08-25 PROCEDURE — 99999 PR PBB SHADOW E&M-EST. PATIENT-LVL IV: ICD-10-PCS | Mod: PBBFAC,,, | Performed by: DERMATOLOGY

## 2021-08-25 PROCEDURE — 4010F ACE/ARB THERAPY RXD/TAKEN: CPT | Mod: CPTII,S$GLB,, | Performed by: DERMATOLOGY

## 2021-08-25 PROCEDURE — 1160F RVW MEDS BY RX/DR IN RCRD: CPT | Mod: CPTII,S$GLB,, | Performed by: DERMATOLOGY

## 2021-08-25 PROCEDURE — 99999 PR PBB SHADOW E&M-EST. PATIENT-LVL IV: CPT | Mod: PBBFAC,,, | Performed by: DERMATOLOGY

## 2021-08-25 PROCEDURE — 1159F PR MEDICATION LIST DOCUMENTED IN MEDICAL RECORD: ICD-10-PCS | Mod: CPTII,S$GLB,, | Performed by: DERMATOLOGY

## 2021-08-25 PROCEDURE — 4010F PR ACE/ARB THEARPY RXD/TAKEN: ICD-10-PCS | Mod: CPTII,S$GLB,, | Performed by: DERMATOLOGY

## 2021-08-25 PROCEDURE — 99214 OFFICE O/P EST MOD 30 MIN: CPT | Mod: S$GLB,,, | Performed by: DERMATOLOGY

## 2021-08-25 PROCEDURE — 1159F MED LIST DOCD IN RCRD: CPT | Mod: CPTII,S$GLB,, | Performed by: DERMATOLOGY

## 2021-08-25 PROCEDURE — 99214 PR OFFICE/OUTPT VISIT, EST, LEVL IV, 30-39 MIN: ICD-10-PCS | Mod: S$GLB,,, | Performed by: DERMATOLOGY

## 2021-08-25 RX ORDER — CICLOPIROX OLAMINE 7.7 MG/G
CREAM TOPICAL 2 TIMES DAILY
Qty: 30 G | Refills: 2 | Status: SHIPPED | OUTPATIENT
Start: 2021-08-25 | End: 2021-11-29

## 2021-08-25 RX ORDER — FLUOCINONIDE TOPICAL SOLUTION USP, 0.05% 0.5 MG/ML
SOLUTION TOPICAL
Qty: 60 ML | Refills: 3 | Status: SHIPPED | OUTPATIENT
Start: 2021-08-25

## 2021-08-25 RX ORDER — CICLOPIROX 1 G/100ML
SHAMPOO TOPICAL
Qty: 120 ML | Refills: 6 | Status: SHIPPED | OUTPATIENT
Start: 2021-08-25

## 2021-11-16 ENCOUNTER — OFFICE VISIT (OUTPATIENT)
Dept: PODIATRY | Facility: CLINIC | Age: 36
End: 2021-11-16
Payer: COMMERCIAL

## 2021-11-16 ENCOUNTER — APPOINTMENT (OUTPATIENT)
Dept: RADIOLOGY | Facility: OTHER | Age: 36
End: 2021-11-16
Attending: PODIATRIST
Payer: COMMERCIAL

## 2021-11-16 VITALS
WEIGHT: 315 LBS | BODY MASS INDEX: 38.36 KG/M2 | SYSTOLIC BLOOD PRESSURE: 115 MMHG | HEART RATE: 70 BPM | DIASTOLIC BLOOD PRESSURE: 71 MMHG | HEIGHT: 76 IN

## 2021-11-16 DIAGNOSIS — S90.852A FOREIGN BODY IN LEFT FOOT, INITIAL ENCOUNTER: ICD-10-CM

## 2021-11-16 DIAGNOSIS — M79.672 FOOT PAIN, LEFT: ICD-10-CM

## 2021-11-16 DIAGNOSIS — S90.852A FOREIGN BODY IN LEFT FOOT, INITIAL ENCOUNTER: Primary | ICD-10-CM

## 2021-11-16 PROCEDURE — 99999 PR PBB SHADOW E&M-EST. PATIENT-LVL V: ICD-10-PCS | Mod: PBBFAC,,, | Performed by: PODIATRIST

## 2021-11-16 PROCEDURE — 99212 PR OFFICE/OUTPT VISIT, EST, LEVL II, 10-19 MIN: ICD-10-PCS | Mod: 25,S$GLB,, | Performed by: PODIATRIST

## 2021-11-16 PROCEDURE — 1160F PR REVIEW ALL MEDS BY PRESCRIBER/CLIN PHARMACIST DOCUMENTED: ICD-10-PCS | Mod: CPTII,S$GLB,, | Performed by: PODIATRIST

## 2021-11-16 PROCEDURE — 3074F SYST BP LT 130 MM HG: CPT | Mod: CPTII,S$GLB,, | Performed by: PODIATRIST

## 2021-11-16 PROCEDURE — 3078F DIAST BP <80 MM HG: CPT | Mod: CPTII,S$GLB,, | Performed by: PODIATRIST

## 2021-11-16 PROCEDURE — 73630 XR FOOT COMPLETE 3 VIEW LEFT: ICD-10-PCS | Mod: 26,LT,, | Performed by: RADIOLOGY

## 2021-11-16 PROCEDURE — 4010F PR ACE/ARB THEARPY RXD/TAKEN: ICD-10-PCS | Mod: CPTII,S$GLB,, | Performed by: PODIATRIST

## 2021-11-16 PROCEDURE — 3078F PR MOST RECENT DIASTOLIC BLOOD PRESSURE < 80 MM HG: ICD-10-PCS | Mod: CPTII,S$GLB,, | Performed by: PODIATRIST

## 2021-11-16 PROCEDURE — 3008F PR BODY MASS INDEX (BMI) DOCUMENTED: ICD-10-PCS | Mod: CPTII,S$GLB,, | Performed by: PODIATRIST

## 2021-11-16 PROCEDURE — 1160F RVW MEDS BY RX/DR IN RCRD: CPT | Mod: CPTII,S$GLB,, | Performed by: PODIATRIST

## 2021-11-16 PROCEDURE — 87077 CULTURE AEROBIC IDENTIFY: CPT | Performed by: PODIATRIST

## 2021-11-16 PROCEDURE — 99999 PR PBB SHADOW E&M-EST. PATIENT-LVL V: CPT | Mod: PBBFAC,,, | Performed by: PODIATRIST

## 2021-11-16 PROCEDURE — 3008F BODY MASS INDEX DOCD: CPT | Mod: CPTII,S$GLB,, | Performed by: PODIATRIST

## 2021-11-16 PROCEDURE — 87075 CULTR BACTERIA EXCEPT BLOOD: CPT | Performed by: PODIATRIST

## 2021-11-16 PROCEDURE — 87070 CULTURE OTHR SPECIMN AEROBIC: CPT | Performed by: PODIATRIST

## 2021-11-16 PROCEDURE — 73630 X-RAY EXAM OF FOOT: CPT | Mod: TC,LT

## 2021-11-16 PROCEDURE — 73630 X-RAY EXAM OF FOOT: CPT | Mod: 26,LT,, | Performed by: RADIOLOGY

## 2021-11-16 PROCEDURE — 3074F PR MOST RECENT SYSTOLIC BLOOD PRESSURE < 130 MM HG: ICD-10-PCS | Mod: CPTII,S$GLB,, | Performed by: PODIATRIST

## 2021-11-16 PROCEDURE — 4010F ACE/ARB THERAPY RXD/TAKEN: CPT | Mod: CPTII,S$GLB,, | Performed by: PODIATRIST

## 2021-11-16 PROCEDURE — 99212 OFFICE O/P EST SF 10 MIN: CPT | Mod: 25,S$GLB,, | Performed by: PODIATRIST

## 2021-11-16 PROCEDURE — 1159F MED LIST DOCD IN RCRD: CPT | Mod: CPTII,S$GLB,, | Performed by: PODIATRIST

## 2021-11-16 PROCEDURE — 87186 SC STD MICRODIL/AGAR DIL: CPT | Performed by: PODIATRIST

## 2021-11-16 PROCEDURE — 1159F PR MEDICATION LIST DOCUMENTED IN MEDICAL RECORD: ICD-10-PCS | Mod: CPTII,S$GLB,, | Performed by: PODIATRIST

## 2021-11-18 ENCOUNTER — TELEPHONE (OUTPATIENT)
Dept: PODIATRY | Facility: CLINIC | Age: 36
End: 2021-11-18
Payer: COMMERCIAL

## 2021-11-18 LAB — BACTERIA SPEC AEROBE CULT: ABNORMAL

## 2021-11-18 RX ORDER — DOXYCYCLINE 100 MG/1
100 TABLET ORAL 2 TIMES DAILY
Qty: 20 TABLET | Refills: 0 | Status: SHIPPED | OUTPATIENT
Start: 2021-11-18

## 2021-11-22 LAB — BACTERIA SPEC ANAEROBE CULT: NORMAL

## 2021-11-23 ENCOUNTER — OFFICE VISIT (OUTPATIENT)
Dept: PODIATRY | Facility: CLINIC | Age: 36
End: 2021-11-23
Payer: COMMERCIAL

## 2021-11-23 VITALS
BODY MASS INDEX: 38.36 KG/M2 | DIASTOLIC BLOOD PRESSURE: 74 MMHG | HEIGHT: 76 IN | HEART RATE: 72 BPM | SYSTOLIC BLOOD PRESSURE: 130 MMHG | WEIGHT: 315 LBS

## 2021-11-23 DIAGNOSIS — M72.2 PLANTAR FASCIITIS: ICD-10-CM

## 2021-11-23 DIAGNOSIS — M79.671 FOOT PAIN, BILATERAL: ICD-10-CM

## 2021-11-23 DIAGNOSIS — M79.672 FOOT PAIN, BILATERAL: ICD-10-CM

## 2021-11-23 DIAGNOSIS — S90.852D FOREIGN BODY IN LEFT FOOT, SUBSEQUENT ENCOUNTER: Primary | ICD-10-CM

## 2021-11-23 DIAGNOSIS — M24.573 EQUINUS CONTRACTURE OF ANKLE: ICD-10-CM

## 2021-11-23 PROCEDURE — 99999 PR PBB SHADOW E&M-EST. PATIENT-LVL V: ICD-10-PCS | Mod: PBBFAC,,, | Performed by: PODIATRIST

## 2021-11-23 PROCEDURE — 29540 STRAPPING ANKLE &/FOOT: CPT | Mod: 50,59,S$GLB, | Performed by: PODIATRIST

## 2021-11-23 PROCEDURE — 20550 PR INJECT TENDON SHEATH/LIGAMENT: ICD-10-PCS | Mod: 50,S$GLB,, | Performed by: PODIATRIST

## 2021-11-23 PROCEDURE — 99999 PR PBB SHADOW E&M-EST. PATIENT-LVL V: CPT | Mod: PBBFAC,,, | Performed by: PODIATRIST

## 2021-11-23 PROCEDURE — 99214 PR OFFICE/OUTPT VISIT, EST, LEVL IV, 30-39 MIN: ICD-10-PCS | Mod: 25,S$GLB,, | Performed by: PODIATRIST

## 2021-11-23 PROCEDURE — 29540 PR STRAPPING; ANKLE &/OR FOOT: ICD-10-PCS | Mod: 50,59,S$GLB, | Performed by: PODIATRIST

## 2021-11-23 PROCEDURE — 4010F PR ACE/ARB THEARPY RXD/TAKEN: ICD-10-PCS | Mod: CPTII,S$GLB,, | Performed by: PODIATRIST

## 2021-11-23 PROCEDURE — 99214 OFFICE O/P EST MOD 30 MIN: CPT | Mod: 25,S$GLB,, | Performed by: PODIATRIST

## 2021-11-23 PROCEDURE — 4010F ACE/ARB THERAPY RXD/TAKEN: CPT | Mod: CPTII,S$GLB,, | Performed by: PODIATRIST

## 2021-11-23 PROCEDURE — 20550 NJX 1 TENDON SHEATH/LIGAMENT: CPT | Mod: 50,S$GLB,, | Performed by: PODIATRIST

## 2021-11-23 RX ORDER — EMTRICITABINE AND TENOFOVIR ALAFENAMIDE 200; 25 MG/1; MG/1
1 TABLET ORAL DAILY
COMMUNITY
Start: 2021-11-11

## 2021-11-23 RX ORDER — SODIUM, POTASSIUM,MAG SULFATES 17.5-3.13G
SOLUTION, RECONSTITUTED, ORAL ORAL
COMMUNITY
Start: 2021-07-14

## 2021-11-23 RX ORDER — TRIAMCINOLONE ACETONIDE 40 MG/ML
40 INJECTION, SUSPENSION INTRA-ARTICULAR; INTRAMUSCULAR
Status: COMPLETED | OUTPATIENT
Start: 2021-11-23 | End: 2021-11-23

## 2021-11-23 RX ORDER — METHYLPREDNISOLONE 4 MG/1
TABLET ORAL
Qty: 1 EACH | Refills: 0 | Status: SHIPPED | OUTPATIENT
Start: 2021-11-23

## 2021-11-23 RX ORDER — DEXAMETHASONE SODIUM PHOSPHATE 4 MG/ML
4 INJECTION, SOLUTION INTRA-ARTICULAR; INTRALESIONAL; INTRAMUSCULAR; INTRAVENOUS; SOFT TISSUE
Status: COMPLETED | OUTPATIENT
Start: 2021-11-23 | End: 2021-11-23

## 2021-11-23 RX ADMIN — DEXAMETHASONE SODIUM PHOSPHATE 4 MG: 4 INJECTION, SOLUTION INTRA-ARTICULAR; INTRALESIONAL; INTRAMUSCULAR; INTRAVENOUS; SOFT TISSUE at 09:11

## 2021-11-23 RX ADMIN — TRIAMCINOLONE ACETONIDE 40 MG: 40 INJECTION, SUSPENSION INTRA-ARTICULAR; INTRAMUSCULAR at 09:11

## 2021-12-09 ENCOUNTER — OFFICE VISIT (OUTPATIENT)
Dept: OTOLARYNGOLOGY | Facility: CLINIC | Age: 36
End: 2021-12-09
Payer: COMMERCIAL

## 2021-12-09 VITALS — BODY MASS INDEX: 40.44 KG/M2 | WEIGHT: 315 LBS

## 2021-12-09 DIAGNOSIS — J34.2 NASAL SEPTAL DEVIATION: ICD-10-CM

## 2021-12-09 DIAGNOSIS — G47.33 OBSTRUCTIVE SLEEP APNEA TREATED WITH CONTINUOUS POSITIVE AIRWAY PRESSURE (CPAP): ICD-10-CM

## 2021-12-09 DIAGNOSIS — D61.9 APLASTIC ANEMIA: ICD-10-CM

## 2021-12-09 DIAGNOSIS — H61.23 BILATERAL IMPACTED CERUMEN: Primary | ICD-10-CM

## 2021-12-09 DIAGNOSIS — J30.9 ALLERGIC RHINITIS, UNSPECIFIED SEASONALITY, UNSPECIFIED TRIGGER: ICD-10-CM

## 2021-12-09 PROCEDURE — 69210 REMOVE IMPACTED EAR WAX UNI: CPT | Mod: S$GLB,,, | Performed by: SPECIALIST

## 2021-12-09 PROCEDURE — 99213 PR OFFICE/OUTPT VISIT, EST, LEVL III, 20-29 MIN: ICD-10-PCS | Mod: 25,S$GLB,, | Performed by: SPECIALIST

## 2021-12-09 PROCEDURE — 99999 PR PBB SHADOW E&M-EST. PATIENT-LVL I: CPT | Mod: PBBFAC,,, | Performed by: SPECIALIST

## 2021-12-09 PROCEDURE — 4010F ACE/ARB THERAPY RXD/TAKEN: CPT | Mod: CPTII,S$GLB,, | Performed by: SPECIALIST

## 2021-12-09 PROCEDURE — 4010F PR ACE/ARB THEARPY RXD/TAKEN: ICD-10-PCS | Mod: CPTII,S$GLB,, | Performed by: SPECIALIST

## 2021-12-09 PROCEDURE — 99213 OFFICE O/P EST LOW 20 MIN: CPT | Mod: 25,S$GLB,, | Performed by: SPECIALIST

## 2021-12-09 PROCEDURE — 69210 EAR CERUMEN REMOVAL: ICD-10-PCS | Mod: S$GLB,,, | Performed by: SPECIALIST

## 2021-12-09 PROCEDURE — 99999 PR PBB SHADOW E&M-EST. PATIENT-LVL I: ICD-10-PCS | Mod: PBBFAC,,, | Performed by: SPECIALIST

## 2022-01-06 ENCOUNTER — PATIENT OUTREACH (OUTPATIENT)
Dept: ADMINISTRATIVE | Facility: OTHER | Age: 37
End: 2022-01-06
Payer: COMMERCIAL

## 2022-01-06 DIAGNOSIS — I10 ESSENTIAL HYPERTENSION: ICD-10-CM

## 2022-01-06 NOTE — TELEPHONE ENCOUNTER
No new care gaps identified.  Powered by [employer]. Reference number: [id number].   1/06/2022 5:20:22 PM CST

## 2022-01-08 RX ORDER — AMLODIPINE BESYLATE 10 MG/1
TABLET ORAL
Qty: 90 TABLET | Refills: 1 | Status: SHIPPED | OUTPATIENT
Start: 2022-01-08 | End: 2022-03-23 | Stop reason: SDUPTHER

## 2022-01-09 NOTE — TELEPHONE ENCOUNTER
Refill Authorization Note   Ken Anderson  is requesting a refill authorization.  Brief Assessment and Rationale for Refill:  Approve     Medication Therapy Plan:       Medication Reconciliation Completed: No   Comments:   --->Care Gap information included below if applicable.   Orders Placed This Encounter    amLODIPine (NORVASC) 10 MG tablet      Requested Prescriptions   Signed Prescriptions Disp Refills    amLODIPine (NORVASC) 10 MG tablet 90 tablet 1     Sig: TAKE 1 TABLET(10 MG) BY MOUTH EVERY DAY       Cardiovascular:  Calcium Channel Blockers Passed - 1/6/2022  5:19 PM        Passed - Patient is at least 18 years old        Passed - Last BP in normal range within 360 days     BP Readings from Last 1 Encounters:   11/23/21 130/74               Passed - Valid encounter within last 15 months     Recent Visits  Date Type Provider Dept   07/06/21 Office Visit Larisa Cooley MD [facility]   12/01/20 Office Visit Larisa Cooley MD [facility]   10/19/20 Office Visit Larisa Cooley MD [facility]   Showing recent visits within past 720 days and meeting all other requirements  Future Appointments  No visits were found meeting these conditions.  Showing future appointments within next 150 days and meeting all other requirements      Future Appointments              In 3 days Danny Casillas, [facility] [place] [facility], [place]    In 1 week MD [facility] [place] [facility]                    Appointments  past 12m or future 3m with PCP    Date Provider   Last Visit   7/6/2021 Larisa Cooley MD   Next Visit   1/18/2022 Larisa Cooley MD   ED visits in past 90 days: 0     Note composed:8:02 PM 01/08/2022

## 2022-01-10 ENCOUNTER — PATIENT MESSAGE (OUTPATIENT)
Dept: INTERNAL MEDICINE | Facility: CLINIC | Age: 37
End: 2022-01-10
Payer: COMMERCIAL

## 2022-01-10 ENCOUNTER — TELEPHONE (OUTPATIENT)
Dept: INTERNAL MEDICINE | Facility: CLINIC | Age: 37
End: 2022-01-10

## 2022-01-10 DIAGNOSIS — Z00.00 PREVENTATIVE HEALTH CARE: Primary | ICD-10-CM

## 2022-01-10 NOTE — TELEPHONE ENCOUNTER
Care Due:                  Date            Visit Type   Department     Provider  --------------------------------------------------------------------------------                                MYCHART                              FOLLOWUP/OF  [facility]  Last Visit: 07-      FICE VISIT   MEDICINE       Larisa Cooley                                           [facility]  Next Visit: 01-      None         [facility]       Larisa Cooley                                                            Last  Test          Frequency    Reason                     Performed    Due Date  --------------------------------------------------------------------------------    CBC.........  12 months..  valACYclovir.............  Not Found    Overdue    Powered by [employer]. Reference number: [id number].   1/10/2022 5:07:20 PM CST

## 2022-01-11 ENCOUNTER — OFFICE VISIT (OUTPATIENT)
Dept: PODIATRY | Facility: CLINIC | Age: 37
End: 2022-01-11
Payer: COMMERCIAL

## 2022-01-11 ENCOUNTER — LAB VISIT (OUTPATIENT)
Dept: LAB | Facility: HOSPITAL | Age: 37
End: 2022-01-11
Attending: INTERNAL MEDICINE
Payer: COMMERCIAL

## 2022-01-11 VITALS
SYSTOLIC BLOOD PRESSURE: 142 MMHG | HEIGHT: 76 IN | DIASTOLIC BLOOD PRESSURE: 82 MMHG | WEIGHT: 315 LBS | BODY MASS INDEX: 38.36 KG/M2 | HEART RATE: 84 BPM

## 2022-01-11 DIAGNOSIS — Z00.00 PREVENTATIVE HEALTH CARE: ICD-10-CM

## 2022-01-11 DIAGNOSIS — M24.573 EQUINUS CONTRACTURE OF ANKLE: ICD-10-CM

## 2022-01-11 DIAGNOSIS — M79.671 FOOT PAIN, BILATERAL: ICD-10-CM

## 2022-01-11 DIAGNOSIS — M72.2 PLANTAR FASCIITIS: Primary | ICD-10-CM

## 2022-01-11 DIAGNOSIS — M79.672 FOOT PAIN, BILATERAL: ICD-10-CM

## 2022-01-11 PROCEDURE — 86480 TB TEST CELL IMMUN MEASURE: CPT | Performed by: INTERNAL MEDICINE

## 2022-01-11 PROCEDURE — 3079F PR MOST RECENT DIASTOLIC BLOOD PRESSURE 80-89 MM HG: ICD-10-PCS | Mod: CPTII,S$GLB,, | Performed by: PODIATRIST

## 2022-01-11 PROCEDURE — 3079F DIAST BP 80-89 MM HG: CPT | Mod: CPTII,S$GLB,, | Performed by: PODIATRIST

## 2022-01-11 PROCEDURE — 1159F MED LIST DOCD IN RCRD: CPT | Mod: CPTII,S$GLB,, | Performed by: PODIATRIST

## 2022-01-11 PROCEDURE — 99212 OFFICE O/P EST SF 10 MIN: CPT | Mod: S$GLB,,, | Performed by: PODIATRIST

## 2022-01-11 PROCEDURE — 99999 PR PBB SHADOW E&M-EST. PATIENT-LVL V: ICD-10-PCS | Mod: PBBFAC,,, | Performed by: PODIATRIST

## 2022-01-11 PROCEDURE — 3008F BODY MASS INDEX DOCD: CPT | Mod: CPTII,S$GLB,, | Performed by: PODIATRIST

## 2022-01-11 PROCEDURE — 3008F PR BODY MASS INDEX (BMI) DOCUMENTED: ICD-10-PCS | Mod: CPTII,S$GLB,, | Performed by: PODIATRIST

## 2022-01-11 PROCEDURE — 99999 PR PBB SHADOW E&M-EST. PATIENT-LVL V: CPT | Mod: PBBFAC,,, | Performed by: PODIATRIST

## 2022-01-11 PROCEDURE — 1159F PR MEDICATION LIST DOCUMENTED IN MEDICAL RECORD: ICD-10-PCS | Mod: CPTII,S$GLB,, | Performed by: PODIATRIST

## 2022-01-11 PROCEDURE — 99212 PR OFFICE/OUTPT VISIT, EST, LEVL II, 10-19 MIN: ICD-10-PCS | Mod: S$GLB,,, | Performed by: PODIATRIST

## 2022-01-11 PROCEDURE — 3077F SYST BP >= 140 MM HG: CPT | Mod: CPTII,S$GLB,, | Performed by: PODIATRIST

## 2022-01-11 PROCEDURE — 36415 COLL VENOUS BLD VENIPUNCTURE: CPT | Performed by: INTERNAL MEDICINE

## 2022-01-11 PROCEDURE — 1160F RVW MEDS BY RX/DR IN RCRD: CPT | Mod: CPTII,S$GLB,, | Performed by: PODIATRIST

## 2022-01-11 PROCEDURE — 1160F PR REVIEW ALL MEDS BY PRESCRIBER/CLIN PHARMACIST DOCUMENTED: ICD-10-PCS | Mod: CPTII,S$GLB,, | Performed by: PODIATRIST

## 2022-01-11 PROCEDURE — 3077F PR MOST RECENT SYSTOLIC BLOOD PRESSURE >= 140 MM HG: ICD-10-PCS | Mod: CPTII,S$GLB,, | Performed by: PODIATRIST

## 2022-01-11 RX ORDER — LOSARTAN POTASSIUM 100 MG/1
TABLET ORAL
Qty: 90 TABLET | Refills: 0 | Status: SHIPPED | OUTPATIENT
Start: 2022-01-11 | End: 2022-03-23 | Stop reason: SDUPTHER

## 2022-01-11 RX ORDER — LOSARTAN POTASSIUM 100 MG/1
TABLET ORAL
Qty: 90 TABLET | Refills: 0 | Status: SHIPPED | OUTPATIENT
Start: 2022-01-11 | End: 2023-09-28

## 2022-01-11 NOTE — PROGRESS NOTES
Subjective:      Patient ID: Ken Anderson is a 36 y.o. male.    Chief Complaint: Follow-up and Plantar Fasciitis (Bilateral)      Cc  sharp deep pain bottom both heels 5-7/10.  Gradual onset, improving well over past several weeks, aggravated by increased weight bearing, shoe gear, pressure.  , inserts, athletic shoes, injection last visit helped well..  OTC pain med not helping. Denies trauma, surgery bothfeet.    Review of Systems   Constitutional: Negative for chills, diaphoresis, fever, malaise/fatigue and night sweats.   Cardiovascular: Negative for claudication, cyanosis, leg swelling and syncope.   Skin: Positive for suspicious lesions. Negative for color change, dry skin, nail changes, rash and unusual hair distribution.   Musculoskeletal: Negative for falls, joint pain, joint swelling, muscle cramps, muscle weakness and stiffness.   Gastrointestinal: Negative for constipation, diarrhea, nausea and vomiting.   Neurological: Negative for brief paralysis, disturbances in coordination, focal weakness, numbness, paresthesias, sensory change and tremors.           Objective:      Physical Exam  Constitutional:       General: He is not in acute distress.     Appearance: He is well-developed. He is not diaphoretic.   Cardiovascular:      Pulses:           Popliteal pulses are 2+ on the right side and 2+ on the left side.        Dorsalis pedis pulses are 2+ on the right side and 2+ on the left side.        Posterior tibial pulses are 2+ on the right side and 2+ on the left side.      Comments: Capillary refill 3 seconds all toes/distal feet, all toes/both feet warm to touch.      Negative lymphadenopathy bilateral popliteal fossa and tarsal tunnel.      Negavie lower extremity edema bilateral.    Musculoskeletal:      Right ankle: No swelling, deformity, ecchymosis or lacerations. Normal range of motion. Normal pulse.      Right Achilles Tendon: Normal. No defects. Becker's test negative.      Comments:  Sharp deep pain to palpation inferior heel right and left at medial calcaneal tubercle without ecchymosis, erythema, edema, or cardinal signs infection, and no signs of trauma.    Ankle dorsiflexion decreased at <10 degrees bilateral with moderate increase with knee flexion bilateral.]    Otherwise, Normal angle, base, station of gait. All ten toes without clubbing, cyanosis, or signs of ischemia.  No pain to palpation bilateral lower extremities.  Range of motion, stability, muscle strength, and muscle tone normal bilateral feet and legs.    Lymphadenopathy:      Lower Body: No right inguinal adenopathy. No left inguinal adenopathy.      Comments: Negative lymphadenopathy bilateral popliteal fossa and tarsal tunnel.    Negative lymphangitic streaking bilateral feet/ankles/legs.   Skin:     General: Skin is warm and dry.      Capillary Refill: Capillary refill takes 2 to 3 seconds.      Coloration: Skin is not pale.      Findings: No abrasion, bruising, burn, ecchymosis, erythema, laceration, lesion or rash.      Nails: There is no clubbing.      Comments:   Wound plantar left midfoot closed today, epithelialized  without ulceration, drainage, pus, tracking, fluctuance, malodor, or cardinal signs infection.     Neurological:      Mental Status: He is alert and oriented to person, place, and time.      Sensory: No sensory deficit.      Motor: No tremor, atrophy or abnormal muscle tone.      Gait: Gait normal.      Comments: Negative allodynia both feet   Psychiatric:         Behavior: Behavior is cooperative.               Assessment:       Encounter Diagnoses   Name Primary?    Plantar fasciitis Yes    Foot pain, bilateral     Equinus contracture of ankle          Plan:       Ken was seen today for follow-up and plantar fasciitis.    Diagnoses and all orders for this visit:    Plantar fasciitis  -     ORTHOTIC DEVICE (DME)    Foot pain, bilateral  -     ORTHOTIC DEVICE (DME)    Equinus contracture of ankle  -      ORTHOTIC DEVICE (DME)      I counseled the patient on his conditions, their implications and medical management.        Prescribed custom orthotics for maintenance of position and support.    Patient will order over-the-counter night splint from [employer] due to concerns for potential insurance non coverage.    Continue stretches, inserts, athletic shoes, activity to tolerance, over-the-counter anti-inflammatory and pain relief per label as needed.          Follow up if symptoms worsen or fail to improve.

## 2022-01-12 LAB
GAMMA INTERFERON BACKGROUND BLD IA-ACNC: 0.02 IU/ML
M TB IFN-G CD4+ BCKGRND COR BLD-ACNC: 0.01 IU/ML
MITOGEN IGNF BCKGRD COR BLD-ACNC: >10 IU/ML
TB GOLD PLUS: NEGATIVE
TB2 - NIL: 0.01 IU/ML

## 2022-02-11 ENCOUNTER — TELEPHONE (OUTPATIENT)
Dept: INTERNAL MEDICINE | Facility: CLINIC | Age: 37
End: 2022-02-11
Payer: COMMERCIAL

## 2022-02-11 ENCOUNTER — PATIENT MESSAGE (OUTPATIENT)
Dept: INTERNAL MEDICINE | Facility: CLINIC | Age: 37
End: 2022-02-11
Payer: COMMERCIAL

## 2022-02-11 NOTE — TELEPHONE ENCOUNTER
----- Message from Coni Levy sent at 2/11/2022  2:51 PM CST -----  Regarding: Medication squestion  Contact: [phone]  Patient Ken calling in ref to receiving a medication named evusheld - the covid prophylactic drug. Patient would like to know if he's able to receive this medication. Patient doesn't have Covid just asking he can receive the medication as he was informed by his hem/onc doctor at [facility] that theres a shortage of the medication. Please call patient at [phone]      Thank You

## 2022-02-14 ENCOUNTER — TELEPHONE (OUTPATIENT)
Dept: INTERNAL MEDICINE | Facility: CLINIC | Age: 37
End: 2022-02-14
Payer: COMMERCIAL

## 2022-02-14 NOTE — TELEPHONE ENCOUNTER
Please let him know it would be up to his oncologist to determine if he qualifies for that medication. They would need to prescribe it

## 2022-02-14 NOTE — TELEPHONE ENCOUNTER
Called and spoke to pt. According to the infusion clinic, if he qualifies for it and meets the criteria they will reach out to him and schedule it. Will let pt know. Also informed pt that hemo/onc would have to write the order.

## 2022-02-16 ENCOUNTER — INFUSION (OUTPATIENT)
Dept: INFUSION THERAPY | Facility: HOSPITAL | Age: 37
End: 2022-02-16
Payer: COMMERCIAL

## 2022-02-16 VITALS
RESPIRATION RATE: 17 BRPM | TEMPERATURE: 98 F | OXYGEN SATURATION: 99 % | DIASTOLIC BLOOD PRESSURE: 79 MMHG | SYSTOLIC BLOOD PRESSURE: 115 MMHG | HEART RATE: 79 BPM

## 2022-02-16 DIAGNOSIS — D59.5 PAROXYSMAL NOCTURNAL HEMOGLOBINURIA (PNH): Primary | ICD-10-CM

## 2022-02-16 PROCEDURE — M0220 HC INJECTION ADMIN, TIXAGEVIMAB-CILGAVIMAB, INCL POST ADMIN MONIT: HCPCS | Performed by: INTERNAL MEDICINE

## 2022-02-16 PROCEDURE — 63600175 PHARM REV CODE 636 W HCPCS: Performed by: INTERNAL MEDICINE

## 2022-02-16 RX ORDER — DIPHENHYDRAMINE HCL 25 MG
25 CAPSULE ORAL ONCE AS NEEDED
Status: DISCONTINUED | OUTPATIENT
Start: 2022-02-16 | End: 2022-02-16 | Stop reason: HOSPADM

## 2022-02-16 RX ORDER — ONDANSETRON 4 MG/1
4 TABLET, ORALLY DISINTEGRATING ORAL ONCE AS NEEDED
Status: CANCELLED | OUTPATIENT
Start: 2022-02-16

## 2022-02-16 RX ORDER — PREDNISONE 20 MG/1
40 TABLET ORAL ONCE AS NEEDED
Status: DISCONTINUED | OUTPATIENT
Start: 2022-02-16 | End: 2022-02-16 | Stop reason: HOSPADM

## 2022-02-16 RX ORDER — ALBUTEROL SULFATE 90 UG/1
2 AEROSOL, METERED RESPIRATORY (INHALATION) ONCE AS NEEDED
Status: CANCELLED | OUTPATIENT
Start: 2022-02-16

## 2022-02-16 RX ORDER — ACETAMINOPHEN 325 MG/1
650 TABLET ORAL ONCE AS NEEDED
Status: CANCELLED | OUTPATIENT
Start: 2022-02-16

## 2022-02-16 RX ORDER — PREDNISONE 20 MG/1
40 TABLET ORAL ONCE AS NEEDED
Status: CANCELLED | OUTPATIENT
Start: 2022-02-16

## 2022-02-16 RX ORDER — DIPHENHYDRAMINE HCL 25 MG
25 CAPSULE ORAL ONCE AS NEEDED
Status: CANCELLED | OUTPATIENT
Start: 2022-02-16

## 2022-02-16 RX ORDER — ONDANSETRON 4 MG/1
4 TABLET, ORALLY DISINTEGRATING ORAL ONCE AS NEEDED
Status: DISCONTINUED | OUTPATIENT
Start: 2022-02-16 | End: 2022-02-16 | Stop reason: HOSPADM

## 2022-02-16 RX ORDER — EPINEPHRINE 0.3 MG/.3ML
0.3 INJECTION SUBCUTANEOUS
Status: CANCELLED | OUTPATIENT
Start: 2022-02-16

## 2022-02-16 RX ORDER — ALBUTEROL SULFATE 90 UG/1
2 AEROSOL, METERED RESPIRATORY (INHALATION) ONCE AS NEEDED
Status: DISCONTINUED | OUTPATIENT
Start: 2022-02-16 | End: 2022-02-16 | Stop reason: HOSPADM

## 2022-02-16 RX ORDER — EPINEPHRINE 0.3 MG/.3ML
0.3 INJECTION SUBCUTANEOUS
Status: DISCONTINUED | OUTPATIENT
Start: 2022-02-16 | End: 2022-02-16 | Stop reason: HOSPADM

## 2022-02-16 RX ORDER — ACETAMINOPHEN 325 MG/1
650 TABLET ORAL ONCE AS NEEDED
Status: DISCONTINUED | OUTPATIENT
Start: 2022-02-16 | End: 2022-02-16 | Stop reason: HOSPADM

## 2022-02-16 RX ADMIN — Medication 150 MG: at 03:02

## 2022-03-07 ENCOUNTER — TELEPHONE (OUTPATIENT)
Dept: HEMATOLOGY/ONCOLOGY | Facility: CLINIC | Age: 37
End: 2022-03-07
Payer: COMMERCIAL

## 2022-03-07 NOTE — TELEPHONE ENCOUNTER
----- Message from Shannon Toussaint sent at 3/7/2022 11:13 AM CST -----  Regarding: FW: Speak with office  Contact: Ken    ----- Message -----  From: Erin Desir  Sent: 3/7/2022   9:46 AM CST  To: Shannon Toussaint  Subject: FW: Speak with office                              ----- Message -----  From: Jeanette Tracy LPN  Sent: 3/7/2022   9:33 AM CST  To: Erin Desir  Subject: RE: Speak with office                            Not an ID patient last saw Chemo  ----- Message -----  From: Erin Desir  Sent: 3/7/2022   9:26 AM CST  To: Bhavana Mendiola RN, Carey BUTLER Staff  Subject: FW: Speak with office                            Good Morning -     Patient seems to need an appointment for infusion. I do not see a referral on file. Are you able to call patient to clarify if they need the infusion scheduled. This was sent to hemon but provider is ID. Thank you    Erin  ----- Message -----  From: Breezy King  Sent: 3/7/2022   8:57 AM CST  To: [facility]  Subject: Speak with office                                Pt is calling to schedule an second dose of infusion.    [phone]

## 2022-03-08 ENCOUNTER — INFUSION (OUTPATIENT)
Dept: INFUSION THERAPY | Facility: HOSPITAL | Age: 37
End: 2022-03-08
Payer: COMMERCIAL

## 2022-03-08 VITALS
TEMPERATURE: 99 F | SYSTOLIC BLOOD PRESSURE: 142 MMHG | RESPIRATION RATE: 18 BRPM | HEART RATE: 102 BPM | DIASTOLIC BLOOD PRESSURE: 91 MMHG | OXYGEN SATURATION: 100 %

## 2022-03-08 DIAGNOSIS — D61.9 APLASTIC ANEMIA: Primary | ICD-10-CM

## 2022-03-08 PROCEDURE — 63600175 PHARM REV CODE 636 W HCPCS: Performed by: INTERNAL MEDICINE

## 2022-03-08 PROCEDURE — M0220 HC INJECTION ADMIN, TIXAGEVIMAB-CILGAVIMAB, INCL POST ADMIN MONIT: HCPCS | Performed by: INTERNAL MEDICINE

## 2022-03-08 RX ORDER — ALBUTEROL SULFATE 90 UG/1
2 AEROSOL, METERED RESPIRATORY (INHALATION) EVERY 4 HOURS PRN
Status: CANCELLED | OUTPATIENT
Start: 2022-03-08

## 2022-03-08 RX ORDER — DIPHENHYDRAMINE HCL 25 MG
25 CAPSULE ORAL ONCE
Status: CANCELLED | OUTPATIENT
Start: 2022-03-08 | End: 2022-03-08

## 2022-03-08 RX ORDER — ONDANSETRON 4 MG/1
4 TABLET, ORALLY DISINTEGRATING ORAL ONCE
Status: CANCELLED | OUTPATIENT
Start: 2022-03-08 | End: 2022-03-08

## 2022-03-08 RX ORDER — ACETAMINOPHEN 325 MG/1
650 TABLET ORAL ONCE AS NEEDED
Status: CANCELLED | OUTPATIENT
Start: 2022-03-08

## 2022-03-08 RX ORDER — ACETAMINOPHEN 325 MG/1
650 TABLET ORAL ONCE
Status: CANCELLED | OUTPATIENT
Start: 2022-03-08 | End: 2022-03-08

## 2022-03-08 RX ORDER — ONDANSETRON 4 MG/1
4 TABLET, ORALLY DISINTEGRATING ORAL ONCE AS NEEDED
Status: CANCELLED | OUTPATIENT
Start: 2022-03-08

## 2022-03-08 RX ORDER — EPINEPHRINE 0.3 MG/.3ML
0.3 INJECTION SUBCUTANEOUS
Status: CANCELLED | OUTPATIENT
Start: 2022-03-08

## 2022-03-08 RX ORDER — PREDNISONE 20 MG/1
40 TABLET ORAL ONCE AS NEEDED
Status: CANCELLED | OUTPATIENT
Start: 2022-03-08

## 2022-03-08 RX ORDER — DIPHENHYDRAMINE HCL 25 MG
25 CAPSULE ORAL ONCE AS NEEDED
Status: CANCELLED | OUTPATIENT
Start: 2022-03-08

## 2022-03-08 RX ORDER — PREDNISONE 20 MG/1
40 TABLET ORAL ONCE
Status: CANCELLED | OUTPATIENT
Start: 2022-03-08 | End: 2022-03-08

## 2022-03-08 RX ORDER — ALBUTEROL SULFATE 90 UG/1
2 AEROSOL, METERED RESPIRATORY (INHALATION) ONCE AS NEEDED
Status: CANCELLED | OUTPATIENT
Start: 2022-03-08

## 2022-03-08 RX ADMIN — Medication 150 MG: at 11:03

## 2022-03-08 NOTE — NURSING
Patient received two gluteal intramuscular injections (left and right) of tixagevimab/cilgavimab (EVUSHELD) with no difficulties tolerating the medication

## 2022-03-23 ENCOUNTER — TELEPHONE (OUTPATIENT)
Dept: BARIATRICS | Facility: CLINIC | Age: 37
End: 2022-03-23
Payer: COMMERCIAL

## 2022-03-23 ENCOUNTER — OFFICE VISIT (OUTPATIENT)
Dept: INTERNAL MEDICINE | Facility: CLINIC | Age: 37
End: 2022-03-23
Payer: COMMERCIAL

## 2022-03-23 DIAGNOSIS — E66.9 OBESITY, UNSPECIFIED CLASSIFICATION, UNSPECIFIED OBESITY TYPE, UNSPECIFIED WHETHER SERIOUS COMORBIDITY PRESENT: Primary | ICD-10-CM

## 2022-03-23 DIAGNOSIS — Z00.00 PREVENTATIVE HEALTH CARE: ICD-10-CM

## 2022-03-23 DIAGNOSIS — I10 ESSENTIAL HYPERTENSION: ICD-10-CM

## 2022-03-23 DIAGNOSIS — I82.512 CHRONIC EMBOLISM AND THROMBOSIS OF LEFT FEMORAL VEIN: ICD-10-CM

## 2022-03-23 PROCEDURE — 99395 PR PREVENTIVE VISIT,EST,18-39: ICD-10-PCS | Mod: S$GLB,,, | Performed by: INTERNAL MEDICINE

## 2022-03-23 PROCEDURE — 4010F PR ACE/ARB THEARPY RXD/TAKEN: ICD-10-PCS | Mod: CPTII,S$GLB,, | Performed by: INTERNAL MEDICINE

## 2022-03-23 PROCEDURE — 4010F ACE/ARB THERAPY RXD/TAKEN: CPT | Mod: CPTII,S$GLB,, | Performed by: INTERNAL MEDICINE

## 2022-03-23 PROCEDURE — 3079F PR MOST RECENT DIASTOLIC BLOOD PRESSURE 80-89 MM HG: ICD-10-PCS | Mod: CPTII,S$GLB,, | Performed by: INTERNAL MEDICINE

## 2022-03-23 PROCEDURE — 3079F DIAST BP 80-89 MM HG: CPT | Mod: CPTII,S$GLB,, | Performed by: INTERNAL MEDICINE

## 2022-03-23 PROCEDURE — 99395 PREV VISIT EST AGE 18-39: CPT | Mod: S$GLB,,, | Performed by: INTERNAL MEDICINE

## 2022-03-23 PROCEDURE — 3008F PR BODY MASS INDEX (BMI) DOCUMENTED: ICD-10-PCS | Mod: CPTII,S$GLB,, | Performed by: INTERNAL MEDICINE

## 2022-03-23 PROCEDURE — 3074F PR MOST RECENT SYSTOLIC BLOOD PRESSURE < 130 MM HG: ICD-10-PCS | Mod: CPTII,S$GLB,, | Performed by: INTERNAL MEDICINE

## 2022-03-23 PROCEDURE — 1159F MED LIST DOCD IN RCRD: CPT | Mod: CPTII,S$GLB,, | Performed by: INTERNAL MEDICINE

## 2022-03-23 PROCEDURE — 3074F SYST BP LT 130 MM HG: CPT | Mod: CPTII,S$GLB,, | Performed by: INTERNAL MEDICINE

## 2022-03-23 PROCEDURE — 99999 PR PBB SHADOW E&M-EST. PATIENT-LVL V: ICD-10-PCS | Mod: PBBFAC,,, | Performed by: INTERNAL MEDICINE

## 2022-03-23 PROCEDURE — 1159F PR MEDICATION LIST DOCUMENTED IN MEDICAL RECORD: ICD-10-PCS | Mod: CPTII,S$GLB,, | Performed by: INTERNAL MEDICINE

## 2022-03-23 PROCEDURE — 99999 PR PBB SHADOW E&M-EST. PATIENT-LVL V: CPT | Mod: PBBFAC,,, | Performed by: INTERNAL MEDICINE

## 2022-03-23 PROCEDURE — 3008F BODY MASS INDEX DOCD: CPT | Mod: CPTII,S$GLB,, | Performed by: INTERNAL MEDICINE

## 2022-03-23 RX ORDER — AMLODIPINE BESYLATE 10 MG/1
10 TABLET ORAL DAILY
Qty: 90 TABLET | Refills: 2 | Status: SHIPPED | OUTPATIENT
Start: 2022-03-23

## 2022-03-23 RX ORDER — METFORMIN HYDROCHLORIDE 500 MG/1
TABLET, EXTENDED RELEASE ORAL
COMMUNITY
Start: 2022-03-15 | End: 2023-10-31 | Stop reason: SDUPTHER

## 2022-03-23 RX ORDER — DESVENLAFAXINE 100 MG/1
100 TABLET, EXTENDED RELEASE ORAL
COMMUNITY
Start: 2022-03-11

## 2022-03-23 RX ORDER — LOSARTAN POTASSIUM 100 MG/1
100 TABLET ORAL DAILY
Qty: 90 TABLET | Refills: 2 | Status: SHIPPED | OUTPATIENT
Start: 2022-03-23 | End: 2023-07-20 | Stop reason: ALTCHOICE

## 2022-03-23 NOTE — TELEPHONE ENCOUNTER
----- Message from Erin Luog sent at 3/23/2022  1:52 PM CDT -----  Regarding: APPOINTMENT  Contact: Self  Pt ask for a call to schedule an appointment w/ Dr Yancey Pt has had his appointment w/ the Finance Coordinator on 9/8/2021 however no one has contact him to schedule an appointment Pt also stated he recently change insurance and need to know if he need to speak to the coordinator again       Contact info  [phone] (home) [phone] (work)

## 2022-03-27 VITALS
SYSTOLIC BLOOD PRESSURE: 128 MMHG | BODY MASS INDEX: 38.36 KG/M2 | HEART RATE: 76 BPM | WEIGHT: 315 LBS | OXYGEN SATURATION: 95 % | DIASTOLIC BLOOD PRESSURE: 86 MMHG | HEIGHT: 76 IN | TEMPERATURE: 99 F

## 2022-03-27 PROBLEM — I82.512 CHRONIC EMBOLISM AND THROMBOSIS OF LEFT FEMORAL VEIN: Status: ACTIVE | Noted: 2022-03-27

## 2022-03-27 NOTE — PROGRESS NOTES
Subjective:       Patient ID: Ken Anderson is a 36 y.o. male.    Chief Complaint: Annual Exam    HPI  He is here for annual exam.  Currently without complaint       Past medical history:  Hypertension, attention deficit disorder, depression, aplastic anemia, PNH, sleep apnea, DVT, colon adenoma.  He had a colonoscopy July 2021     Medications:   Norvasc 10 mg daily, Eliquis, Cozaar 100 mg daily     No known drug allergies       Review of Systems   Constitutional: Negative for chills, fatigue, fever and unexpected weight change.   Respiratory: Negative for chest tightness and shortness of breath.    Cardiovascular: Negative for chest pain and palpitations.   Gastrointestinal: Negative for abdominal pain and blood in stool.   Neurological: Negative for dizziness, syncope, numbness and headaches.       Objective:      Physical Exam  HENT:      Right Ear: External ear normal.      Left Ear: External ear normal.      Nose: Nose normal.      Mouth/Throat:      Mouth: Mucous membranes are moist.      Pharynx: Oropharynx is clear.   Eyes:      Pupils: Pupils are equal, round, and reactive to light.   Cardiovascular:      Rate and Rhythm: Normal rate and regular rhythm.      Heart sounds: No murmur heard.  Pulmonary:      Breath sounds: Normal breath sounds.   Chest:   Breasts:      Right: No axillary adenopathy.      Left: No axillary adenopathy.       Abdominal:      General: There is no distension.      Palpations: There is no hepatomegaly.      Tenderness: There is no abdominal tenderness.   Musculoskeletal:      Cervical back: Normal range of motion.   Lymphadenopathy:      Cervical: No cervical adenopathy.      Upper Body:      Right upper body: No axillary adenopathy.      Left upper body: No axillary adenopathy.   Neurological:      Cranial Nerves: No cranial nerve deficit.      Sensory: No sensory deficit.      Motor: Motor function is intact.      Deep Tendon Reflexes: Reflexes are normal and symmetric.          Assessment/Plan       Assessment and plan:  Annual exam.  He reports having his annual lab work outside of [facility] recently.  He sees a hematologist at [facility].

## 2022-05-13 ENCOUNTER — OFFICE VISIT (OUTPATIENT)
Dept: PULMONOLOGY | Facility: CLINIC | Age: 37
End: 2022-05-13
Payer: COMMERCIAL

## 2022-05-13 ENCOUNTER — TELEPHONE (OUTPATIENT)
Dept: BARIATRICS | Facility: CLINIC | Age: 37
End: 2022-05-13
Payer: COMMERCIAL

## 2022-05-13 VITALS
OXYGEN SATURATION: 96 % | BODY MASS INDEX: 38.36 KG/M2 | DIASTOLIC BLOOD PRESSURE: 81 MMHG | HEIGHT: 76 IN | WEIGHT: 315 LBS | HEART RATE: 90 BPM | SYSTOLIC BLOOD PRESSURE: 131 MMHG

## 2022-05-13 DIAGNOSIS — G47.33 OBSTRUCTIVE SLEEP APNEA TREATED WITH CONTINUOUS POSITIVE AIRWAY PRESSURE (CPAP): ICD-10-CM

## 2022-05-13 PROCEDURE — 3075F PR MOST RECENT SYSTOLIC BLOOD PRESS GE 130-139MM HG: ICD-10-PCS | Mod: CPTII,S$GLB,, | Performed by: INTERNAL MEDICINE

## 2022-05-13 PROCEDURE — 1159F MED LIST DOCD IN RCRD: CPT | Mod: CPTII,S$GLB,, | Performed by: INTERNAL MEDICINE

## 2022-05-13 PROCEDURE — 99999 PR PBB SHADOW E&M-EST. PATIENT-LVL V: ICD-10-PCS | Mod: PBBFAC,,, | Performed by: INTERNAL MEDICINE

## 2022-05-13 PROCEDURE — 3008F BODY MASS INDEX DOCD: CPT | Mod: CPTII,S$GLB,, | Performed by: INTERNAL MEDICINE

## 2022-05-13 PROCEDURE — 4010F ACE/ARB THERAPY RXD/TAKEN: CPT | Mod: CPTII,S$GLB,, | Performed by: INTERNAL MEDICINE

## 2022-05-13 PROCEDURE — 3075F SYST BP GE 130 - 139MM HG: CPT | Mod: CPTII,S$GLB,, | Performed by: INTERNAL MEDICINE

## 2022-05-13 PROCEDURE — 1159F PR MEDICATION LIST DOCUMENTED IN MEDICAL RECORD: ICD-10-PCS | Mod: CPTII,S$GLB,, | Performed by: INTERNAL MEDICINE

## 2022-05-13 PROCEDURE — 3079F PR MOST RECENT DIASTOLIC BLOOD PRESSURE 80-89 MM HG: ICD-10-PCS | Mod: CPTII,S$GLB,, | Performed by: INTERNAL MEDICINE

## 2022-05-13 PROCEDURE — 3079F DIAST BP 80-89 MM HG: CPT | Mod: CPTII,S$GLB,, | Performed by: INTERNAL MEDICINE

## 2022-05-13 PROCEDURE — 99213 OFFICE O/P EST LOW 20 MIN: CPT | Mod: S$GLB,,, | Performed by: INTERNAL MEDICINE

## 2022-05-13 PROCEDURE — 99213 PR OFFICE/OUTPT VISIT, EST, LEVL III, 20-29 MIN: ICD-10-PCS | Mod: S$GLB,,, | Performed by: INTERNAL MEDICINE

## 2022-05-13 PROCEDURE — 3008F PR BODY MASS INDEX (BMI) DOCUMENTED: ICD-10-PCS | Mod: CPTII,S$GLB,, | Performed by: INTERNAL MEDICINE

## 2022-05-13 PROCEDURE — 4010F PR ACE/ARB THEARPY RXD/TAKEN: ICD-10-PCS | Mod: CPTII,S$GLB,, | Performed by: INTERNAL MEDICINE

## 2022-05-13 PROCEDURE — 99999 PR PBB SHADOW E&M-EST. PATIENT-LVL V: CPT | Mod: PBBFAC,,, | Performed by: INTERNAL MEDICINE

## 2022-05-13 NOTE — PROGRESS NOTES
Ken Anderson  was seen as a follow up.    The chief complaint leading to consultation is: marco       CHIEF COMPLAINT:    Chief Complaint   Patient presents with    Apnea       HISTORY OF PRESENT ILLNESS: Ken Anderson is a 36 y.o. male is here for sleep evaluation.   Our first enconter was 9/23/14. Patient was told by father and sister (both are sleep boarded) to have sleep apnea.  Patient with loud snoring.  +fatigue upon awakening.  +weight gain 60 # since 2013.  +sleepiness a/w driving on rare occasion.  No parasomnia.  No cataplexy.  No rls symptoms.      [place] Sleepiness Scale score during initial sleep evaluation was 7.  Today's ESS of 0.    S/p hst in 11/2014 with ahi of 8.  Patient was set up for apap. +using apap most night.  Sleep better with apap.  Rested upon awake.  No snoring with apap.  No difficulty with sleep initiation.  No dry mouth.  Patient admits to lots of stress with new position  of [employer] in [place].  +stress eating.      Patient was diagnosed with dvt 9/18.  Currently on eloquis renew by Dr. Crane.      Patient is using apap nightly.  No issue with apap.  Currently with [facility].  Patient was told by his partner that he snores while on apap.      SLEEP ROUTINE:  Activity the hour prior to sleep: read     Bed partner:  partner  Time to bed:  9:30-10:30 pm   Lights off:  yes  Sleep onset latency:  10 minutes        Disruptions or awakenings:    none    Wakeup time:      6:00 am  Perceived sleep quality:  Rested      Daytime naps:      none  Weekend sleep routine:      12 am till 8 am   Caffeine use: 2 cups of coffee in am and 1 diet coke throughout the day; last coke around 3 pm  exercise habit:   [employer] 6 am daily    PAST MEDICAL HISTORY:    Active Ambulatory Problems     Diagnosis Date Noted    ADHD (attention deficit hyperactivity disorder) 06/29/2012    Severe episode of recurrent major depressive disorder, without psychotic features 06/29/2012     Generalized anxiety disorder 06/29/2012    Panic disorder without agoraphobia 06/29/2012    Adjustment disorder with mixed emotional features 06/29/2012    Aplastic anemia 07/23/2012    Essential hypertension 10/12/2015    Knee pain, bilateral 02/22/2016    Hypertension not at goal 05/10/2017    Major depressive disorder with single episode, in full remission 03/09/2018    Insomnia 04/17/2019    History of smoking 12/17/2019    Severe obesity (BMI 35.0-39.9) with comorbidity 12/17/2019    Obstructive sleep apnea treated with continuous positive airway pressure (CPAP) 08/18/2020    Dysfunction of both eustachian tubes 08/18/2020    Nasal septal deviation 08/18/2020    Allergic rhinitis 08/18/2020    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    Encounter for screening colonoscopy 07/21/2021    Chronic embolism and thrombosis of left femoral vein 03/27/2022     Resolved Ambulatory Problems     Diagnosis Date Noted    Depressive disorder, not elsewhere classified 07/30/2012    Adjustment disorder with mixed anxiety and depressed mood 07/30/2012    Smoking addiction 08/21/2012    ADD (attention deficit disorder) 10/01/2012    Major depressive disorder, recurrent episode, moderate 11/12/2012    Anxiety state, unspecified 01/07/2013    Infiltrate of cornea - Right Eye 03/01/2013    Cornea ulcer - Right Eye 03/04/2013    BMI 38.0-38.9,adult 05/10/2017    Tobacco abuse disorder 05/10/2017    JUSTO (obstructive sleep apnea) 04/17/2019     Past Medical History:   Diagnosis Date    Anxiety state, unspecified 1/7/2013    Blood transfusion     Depression     GERD (gastroesophageal reflux disease)     History of eye injury 13YRS    Major depressive disorder, recurrent episode, mild 6/29/2012                PAST SURGICAL HISTORY:    Past Surgical History:   Procedure Laterality Date    BONE MARROW BIOPSY      COLONOSCOPY      COLONOSCOPY N/A 7/21/2021    Procedure: COLONOSCOPY;   Surgeon: Gino Pal MD;  Location: [facility] (4TH FLR);  Service: Colon and Rectal;  Laterality: N/A;  Fully vacc Covid-19 - pg  2 day hold Eliquis - pg    ESOPHAGOGASTRODUODENOSCOPY           FAMILY HISTORY:                Family History   Problem Relation Age of Onset    Cancer Father         skin    Skin cancer Father     Emphysema Maternal Grandmother     Prostate cancer Maternal Grandfather     Dementia Paternal Grandmother     Cancer Paternal Grandfather         liver    No Known Problems Mother     No Known Problems Sister     No Known Problems Sister     No Known Problems Brother     No Known Problems Maternal Aunt     No Known Problems Maternal Uncle     No Known Problems Paternal Aunt     No Known Problems Paternal Uncle     Melanoma Neg Hx     Psoriasis Neg Hx     Lupus Neg Hx     Eczema Neg Hx     Amblyopia Neg Hx     Blindness Neg Hx     Cataracts Neg Hx     Diabetes Neg Hx     Glaucoma Neg Hx     Hypertension Neg Hx     Macular degeneration Neg Hx     Retinal detachment Neg Hx     Strabismus Neg Hx     Stroke Neg Hx     Thyroid disease Neg Hx        SOCIAL HISTORY:          Tobacco:   Social History     Tobacco Use   Smoking Status Former Smoker    Packs/day: 0.30    Years: 7.00    Pack years: 2.10    Types: Cigarettes    Quit date: 9/2/2018    Years since quitting: 3.6   Smokeless Tobacco Former User       alcohol use:    Social History     Substance and Sexual Activity   Alcohol Use Yes    Alcohol/week: 4.0 standard drinks    Types: 4 Standard drinks or equivalent per week    Comment: 4 week                 Occupation:   for [employer] school    ALLERGIES:    Review of patient's allergies indicates:   Allergen Reactions    No known drug allergies        CURRENT MEDICATIONS:    Current Outpatient Medications   Medication Sig Dispense Refill    acetaminophen (TYLENOL) 325 MG tablet Take 325 mg by mouth every 6 (six) hours as needed for  Pain.      amLODIPine (NORVASC) 10 MG tablet Take 1 tablet (10 mg total) by mouth once daily. 90 tablet 2    azelastine (ASTELIN) 137 mcg (0.1 %) nasal spray Two sprays in each nostril, sniff until absorbed, then follow with 1 spray of fluticasone.  Use both sprays twice daily. 30 mL 11    buPROPion (WELLBUTRIN XL) 150 MG TB24 tablet Take 150 mg by mouth every morning.      CALCIUM CITRATE/VITAMIN D3 (CITRACAL REGULAR ORAL) Take by mouth.      ciclopirox (LOPROX) 0.77 % Crea APPLY TOPICALLY TO THE AFFECTED AREA OF FACE TWICE DAILY 30 g 2    ciclopirox 1 % shampoo AAA daily. Let sit x 5 min prior to rinsing. 120 mL 6    DESCOVY 200-25 mg Tab Take 1 tablet by mouth once daily.      desvenlafaxine succinate (PRISTIQ) 100 MG Tb24 Take 100 mg by mouth.      dicyclomine (BENTYL) 20 mg tablet Take 20 mg by mouth 4 (four) times daily as needed.      doxycycline monohydrate 100 mg Tab Take 1 tablet (100 mg total) by mouth 2 (two) times daily. 20 tablet 0    ELIQUIS 5 mg Tab Take 5 mg by mouth 2 (two) times daily.      enoxaparin (LOVENOX) 150 mg/mL Syrg inject 1 syringe(150mg) into the skin bid 5 mL 0    esketamine (SPRAVATO) 84 mg (28 mg x 3) Spry       flu vacc [id number] 6mos up,PF, (FLUARIX QUAD [id number], PF,) 60 mcg (15 mcg x 4)/0.5 mL Syrg adminster 0.5 mL 0    fluocinonide (LIDEX) 0.05 % external solution AAA scalp qday - bid prn burning or itching 60 mL 3    fluticasone propionate (FLONASE) 50 mcg/actuation nasal spray One spray in each nostril twice daily after 1st using azelastine nasal spray 18.2 mL 11    hydrocortisone 2.5 % cream Apply topically 2 (two) times daily. 20 g 0    ketoconazole (NIZORAL) 2 % cream APPLY EXTERNALLY TO THE AFFECTED AREA TWICE DAILY 60 g 3    ketoconazole (NIZORAL) 2 % shampoo Wash hair and beard 3x/week 120 mL 11    lisdexamfetamine (VYVANSE) 30 MG capsule Take 1 capsule (30 mg total) by mouth every morning. 30 capsule 0    lisdexamfetamine (VYVANSE) 30 MG capsule  Take 1 capsule (30 mg total) by mouth every morning. 30 capsule 0    lisdexamfetamine (VYVANSE) 30 MG capsule Take 1 capsule (30 mg total) by mouth every morning. 30 capsule 0    LORazepam (ATIVAN) 1 MG tablet TAKE 1 TABLET(1 MG) BY MOUTH DAILY AS NEEDED FOR ANXIETY 30 tablet 5    losartan (COZAAR) 100 MG tablet TAKE 1 TABLET(100 MG) BY MOUTH EVERY DAY 90 tablet 0    losartan (COZAAR) 100 MG tablet Take 1 tablet (100 mg total) by mouth once daily. 90 tablet 2    metFORMIN (GLUCOPHAGE-XR) 500 MG ER 24hr tablet SMARTSI Tablet(s) By Mouth Every Evening      methylPREDNISolone (MEDROL DOSEPACK) 4 mg tablet use as directed 1 each 0    nystatin-triamcinolone (MYCOLOG) ointment AAA at corner of mouth BID and continue use for 2 days after resolution of rash 30 g 1    ondansetron (ZOFRAN) 8 MG tablet TK 1 T PO QD PRN      penicillin v potassium (VEETID) 250 MG tablet Take 250 mg by mouth 2 (two) times daily.      ravulizumab-cwvz 10 mg/mL Soln Inject into the vein.      SUPREP BOWEL PREP KIT 17.5-3.13-1.6 gram SolR SMARTSI Milliliter(s) By Mouth Once      TENOFOVIR ALAFENAMIDE ORAL       tretinoin (RETIN-A) 0.05 % cream Apply small amount to entire face qhs. Wash off qam and apply sunscreen. 45 g 3    valACYclovir (VALTREX) 1000 MG tablet TAKE 2 TABLETS BY MOUTH TWICE DAILY FOR 1 DAY; BEGIN AT FIRST SIGN OF OUTBREAK 12 tablet 0    valACYclovir (VALTREX) 500 MG tablet Take 1 tab po daily 30 tablet 3    zolpidem (AMBIEN CR) 12.5 MG CR tablet Take 12.5 mg by mouth nightly as needed.      albuterol (PROVENTIL/VENTOLIN HFA) 90 mcg/actuation inhaler Inhale 2 puffs into the lungs every 6 (six) hours as needed for Wheezing. Rescue 18 g 0    tadalafiL (CIALIS) 20 MG Tab Take 1 tablet (20 mg total) by mouth every 72 hours as needed. 30 tablet 11     No current facility-administered medications for this visit.                  REVIEW OF SYSTEMS:     Sleep related symptoms as per HPI.  CONST:  denies weight  "gain    HEENT: Denies sinus congestion  PULM: Denies dyspnea  CARD:  Denies palpitations   GI:  Denies acid reflux  : Denies polyuria  NEURO: Denies headaches  PSYCH: Denies mood disturbance  HEME: Denies anemia   Otherwise, a balance of systems reviewed is negative.                  PHYSICAL EXAM:  Vitals:    05/13/22 0851   BP: 131/81   Pulse: 90   SpO2: 96%   Weight: (!) 153.8 kg (338 lb 15.3 oz)   Height: 6' 4" (1.93 m)   PainSc: 0-No pain     Body mass index is 41.26 kg/m².     GENERAL: Normal development, well groomed  HEENT:  Conjunctivae are non-erythematous; Pupils equal, round, and reactive to light; Nose is symmetrical; Nasal mucosa is pink and moist; Septum is midline; Inferior turbinates are normal; Nasal airflow is congested; Posterior pharynx is pink; Modified Mallampati: 4; Posterior palate is normal; Tonsils +2; Uvula is normal and pink;Tongue is normal; Dentition is fair; No TMJ tenderness; Jaw opening and protrusion without click and without discomfort.  NECK: Supple. Neck circumference is 16.75 inches. No thyromegaly. No palpable nodes.     SKIN: On face and neck: No abrasions, no rashes, no lesions.  No subcutaneous nodules are palpable.  RESPIRATORY: Chest is clear to auscultation.  Normal chest expansion and non-labored breathing at rest.  CARDIOVASCULAR: Normal S1, S2.  No murmurs, gallops or rubs. No carotid bruits bilaterally.  EXTREMITIES: No edema. No clubbing. No cyanosis. Station normal. Gait normal.        NEURO/PSYCH: Oriented to time, place and person. Normal attention span and concentration. Affect is full. Mood is normal.                                       DATA hst 11/10/14 ahi of 9    Lab Results   Component Value Date    TSH 1.012 10/19/2020     ASSESSMENT  Problem List Items Addressed This Visit     Obstructive sleep apnea treated with continuous positive airway pressure (CPAP)    Overview     ahi of 9.  Doing well with apap and nasal mask.  100%>4 hours. Residual ahi of 6. "  Patient is benefitting from apap.            Relevant Orders    CPAP/BIPAP SUPPLIES           PN - follows by Dr. Nohelia Hennessy.  ravulizumab injection and Life long anticoagulation.      Nasal congestion - currently on flonase, sinus irrigation and allegra.      Tobacco abuse - off chantix.  Still smoke 1 ppd.      Obesity - gained 20 lbs since 2017.   Per patient, patient is loosing weight with diet and exercise.    Education: During our discussion today, we talked about the etiology of obstructive sleep apnea as well as the potential ramifications of untreated sleep apnea, which could include daytime sleepiness, hypertension, heart disease and/or stroke.      Precautions: The patient was advised to abstain from driving should they feel sleepy or drowsy.     Patient will No follow-ups on file.

## 2022-05-27 RX ORDER — AZELASTINE 1 MG/ML
SPRAY, METERED NASAL
Qty: 30 ML | Refills: 11 | Status: SHIPPED | OUTPATIENT
Start: 2022-05-27 | End: 2022-05-31 | Stop reason: SDUPTHER

## 2022-05-27 NOTE — TELEPHONE ENCOUNTER
Care Due:                  Date            Visit Type   Department     Provider  --------------------------------------------------------------------------------                                MYCHART                              FOLLOWUP/OF  [facility] INTERNAL  Last Visit: 03-      FICE VISIT   MEDICINE       Larisa Cooley  Next Visit: None Scheduled  None         None Found                                                            Last  Test          Frequency    Reason                     Performed    Due Date  --------------------------------------------------------------------------------    CBC.........  12 months..  valACYclovir.............  Not Found    Overdue    CMP.........  12 months..  losartan, valACYclovir...  07- 07-    [facility] Embedded Care Gaps. Reference number: [id number]. 5/27/2022   9:45:49 AM CDT

## 2022-05-27 NOTE — TELEPHONE ENCOUNTER
----- Message from Morena Adam sent at 5/27/2022  9:26 AM CDT -----  Regarding: refill reqeust  Contact: durga [phone]  Requesting an RX refill or new RX.  Is this a refill or new RX:   RX name and strength (azelastine (ASTELIN) 137 mcg (0.1 %) nasal spray:    Is this a 30 day or 90 day RX: 90  Pharmacy name and phone #   [facility] [id number] @ [id number]  [facility] [address]  Phone: [phone] Fax: [phone]  The doctors have asked that we provide their patients with the following 2 reminders -- prescription refills can take up to 72 hours, and a friendly reminder that in the future you can use your [facility] account to request refills: #yes

## 2022-05-31 RX ORDER — AZELASTINE 1 MG/ML
SPRAY, METERED NASAL
Qty: 30 ML | Refills: 11 | Status: SHIPPED | OUTPATIENT
Start: 2022-05-31 | End: 2022-06-24 | Stop reason: SDUPTHER

## 2022-05-31 RX ORDER — AZELASTINE 1 MG/ML
SPRAY, METERED NASAL
Qty: 30 ML | Refills: 11 | OUTPATIENT
Start: 2022-05-31

## 2022-06-16 ENCOUNTER — PATIENT MESSAGE (OUTPATIENT)
Dept: PULMONOLOGY | Facility: CLINIC | Age: 37
End: 2022-06-16
Payer: COMMERCIAL

## 2022-06-16 ENCOUNTER — TELEPHONE (OUTPATIENT)
Dept: PULMONOLOGY | Facility: CLINIC | Age: 37
End: 2022-06-16
Payer: COMMERCIAL

## 2022-06-16 NOTE — TELEPHONE ENCOUNTER
----- Message from Corina Bejarano MA sent at 6/16/2022 11:45 AM CDT -----  Hi Dr. Torres:     Hope this message finds you well.  I reach out because I am changed insurance providers since I got a new job a few months ago.  Consequently, [facility]. [facility] is no longer in network.  Would you able be to send a copy of my prescription to "[facility].  They are my in-network provider.  Their fax number is [phone].  IF you have any questions, I can be reach at [phone].   Nilsa gonzalez

## 2022-06-18 ENCOUNTER — PATIENT MESSAGE (OUTPATIENT)
Dept: DERMATOLOGY | Facility: CLINIC | Age: 37
End: 2022-06-18
Payer: COMMERCIAL

## 2022-06-18 DIAGNOSIS — L21.9 ACUTE SEBORRHEIC DERMATITIS: Primary | ICD-10-CM

## 2022-06-18 DIAGNOSIS — L21.9 SEBORRHEIC DERMATITIS: ICD-10-CM

## 2022-06-22 RX ORDER — KETOCONAZOLE 20 MG/G
CREAM TOPICAL
Qty: 60 G | Refills: 3 | Status: SHIPPED | OUTPATIENT
Start: 2022-06-22

## 2022-06-22 RX ORDER — KETOCONAZOLE 20 MG/ML
SHAMPOO, SUSPENSION TOPICAL
Qty: 120 ML | Refills: 11 | Status: SHIPPED | OUTPATIENT
Start: 2022-06-22

## 2022-06-24 ENCOUNTER — OFFICE VISIT (OUTPATIENT)
Dept: OTOLARYNGOLOGY | Facility: CLINIC | Age: 37
End: 2022-06-24
Payer: COMMERCIAL

## 2022-06-24 VITALS
SYSTOLIC BLOOD PRESSURE: 134 MMHG | TEMPERATURE: 98 F | BODY MASS INDEX: 40.25 KG/M2 | DIASTOLIC BLOOD PRESSURE: 79 MMHG | WEIGHT: 315 LBS | HEART RATE: 73 BPM

## 2022-06-24 DIAGNOSIS — J34.2 NASAL SEPTAL DEVIATION: ICD-10-CM

## 2022-06-24 DIAGNOSIS — J30.9 ALLERGIC RHINITIS, UNSPECIFIED SEASONALITY, UNSPECIFIED TRIGGER: ICD-10-CM

## 2022-06-24 DIAGNOSIS — G47.33 OBSTRUCTIVE SLEEP APNEA TREATED WITH CONTINUOUS POSITIVE AIRWAY PRESSURE (CPAP): ICD-10-CM

## 2022-06-24 DIAGNOSIS — H61.23 BILATERAL IMPACTED CERUMEN: Primary | ICD-10-CM

## 2022-06-24 PROCEDURE — 69210 EAR CERUMEN REMOVAL: ICD-10-PCS | Mod: S$GLB,,, | Performed by: SPECIALIST

## 2022-06-24 PROCEDURE — 1160F RVW MEDS BY RX/DR IN RCRD: CPT | Mod: CPTII,S$GLB,, | Performed by: SPECIALIST

## 2022-06-24 PROCEDURE — 99999 PR PBB SHADOW E&M-EST. PATIENT-LVL V: ICD-10-PCS | Mod: PBBFAC,,, | Performed by: SPECIALIST

## 2022-06-24 PROCEDURE — 3008F PR BODY MASS INDEX (BMI) DOCUMENTED: ICD-10-PCS | Mod: CPTII,S$GLB,, | Performed by: SPECIALIST

## 2022-06-24 PROCEDURE — 69210 REMOVE IMPACTED EAR WAX UNI: CPT | Mod: S$GLB,,, | Performed by: SPECIALIST

## 2022-06-24 PROCEDURE — 3078F PR MOST RECENT DIASTOLIC BLOOD PRESSURE < 80 MM HG: ICD-10-PCS | Mod: CPTII,S$GLB,, | Performed by: SPECIALIST

## 2022-06-24 PROCEDURE — 99214 PR OFFICE/OUTPT VISIT, EST, LEVL IV, 30-39 MIN: ICD-10-PCS | Mod: 25,S$GLB,, | Performed by: SPECIALIST

## 2022-06-24 PROCEDURE — 3075F PR MOST RECENT SYSTOLIC BLOOD PRESS GE 130-139MM HG: ICD-10-PCS | Mod: CPTII,S$GLB,, | Performed by: SPECIALIST

## 2022-06-24 PROCEDURE — 1159F PR MEDICATION LIST DOCUMENTED IN MEDICAL RECORD: ICD-10-PCS | Mod: CPTII,S$GLB,, | Performed by: SPECIALIST

## 2022-06-24 PROCEDURE — 4010F PR ACE/ARB THEARPY RXD/TAKEN: ICD-10-PCS | Mod: CPTII,S$GLB,, | Performed by: SPECIALIST

## 2022-06-24 PROCEDURE — 99999 PR PBB SHADOW E&M-EST. PATIENT-LVL V: CPT | Mod: PBBFAC,,, | Performed by: SPECIALIST

## 2022-06-24 PROCEDURE — 3078F DIAST BP <80 MM HG: CPT | Mod: CPTII,S$GLB,, | Performed by: SPECIALIST

## 2022-06-24 PROCEDURE — 3008F BODY MASS INDEX DOCD: CPT | Mod: CPTII,S$GLB,, | Performed by: SPECIALIST

## 2022-06-24 PROCEDURE — 1160F PR REVIEW ALL MEDS BY PRESCRIBER/CLIN PHARMACIST DOCUMENTED: ICD-10-PCS | Mod: CPTII,S$GLB,, | Performed by: SPECIALIST

## 2022-06-24 PROCEDURE — 4010F ACE/ARB THERAPY RXD/TAKEN: CPT | Mod: CPTII,S$GLB,, | Performed by: SPECIALIST

## 2022-06-24 PROCEDURE — 3075F SYST BP GE 130 - 139MM HG: CPT | Mod: CPTII,S$GLB,, | Performed by: SPECIALIST

## 2022-06-24 PROCEDURE — 1159F MED LIST DOCD IN RCRD: CPT | Mod: CPTII,S$GLB,, | Performed by: SPECIALIST

## 2022-06-24 PROCEDURE — 99214 OFFICE O/P EST MOD 30 MIN: CPT | Mod: 25,S$GLB,, | Performed by: SPECIALIST

## 2022-06-24 RX ORDER — FLUTICASONE PROPIONATE 50 MCG
SPRAY, SUSPENSION (ML) NASAL
Qty: 18.2 ML | Refills: 11 | Status: SHIPPED | OUTPATIENT
Start: 2022-06-24

## 2022-06-24 RX ORDER — AZELASTINE 1 MG/ML
SPRAY, METERED NASAL
Qty: 30 ML | Refills: 11 | Status: SHIPPED | OUTPATIENT
Start: 2022-06-24

## 2022-06-24 NOTE — PROGRESS NOTES
"Subjective:       Patient ID: Ken Anderson is a 36 y.o. male.    Chief Complaint: Follow-up (With allergies)    Patient is returning for follow-up visit.  There are multiple issues to discuss:  1. Allergic rhinitis:  His nasal allergy symptoms are well controlled with the  twice daily use of azelastine and fluticasone sprays.   2. Otitis externa:  He continues to swim almost daily.  He is now using vinegar and isopropyl alcohol in his ears after swimming in his no having issues with otitis externa.  3. Obstructive sleep apnea:  He has received his new CPAP machine and does use it on might every night.  He does feel much more rested if he uses it than he did when he was not using.  4. Cerumen impactions:  Both ears are blocked  5. Obesity:  He will be meeting with the bariatric medicine team in July.  His weight is stable at 330 lb.  6. Aplastic anemia:  His values for hematocrit, hemoglobin and platelet count have been "very good" for him.        Review of Systems   Constitutional: Negative for activity change, appetite change, chills, fatigue, fever and unexpected weight change.   HENT: Positive for nasal congestion, ear discharge, ear pain, postnasal drip, sinus pressure/congestion and sore throat. Negative for facial swelling, hearing loss, mouth sores, rhinorrhea, sneezing, tinnitus, trouble swallowing and voice change.    Eyes: Negative for photophobia, pain, discharge, redness, itching and visual disturbance.   Respiratory: Negative for apnea, cough, choking, shortness of breath and wheezing.    Cardiovascular: Negative for chest pain and palpitations.   Gastrointestinal: Negative for abdominal distention, abdominal pain, nausea and vomiting.   Musculoskeletal: Negative for arthralgias, myalgias, neck pain and neck stiffness.   Integumentary:  Negative for color change, pallor and rash. Negative.   Allergic/Immunologic: Positive for environmental allergies. Negative for food allergies.   Neurological: " Positive for dizziness and headaches. Negative for facial asymmetry, speech difficulty, weakness, light-headedness and numbness.   Hematological: Negative for adenopathy. Does not bruise/bleed easily.   Psychiatric/Behavioral: Negative for agitation, confusion, decreased concentration and sleep disturbance.         Objective:      Physical Exam  Vitals and nursing note reviewed.   Constitutional:       General: He is awake.      Appearance: Normal appearance. He is well-developed and well-groomed. He is obese.   HENT:      Head: Normocephalic.      Jaw: Malocclusion (Mild overbite) present.      Salivary Glands: Right salivary gland is not diffusely enlarged. Left salivary gland is not diffusely enlarged.      Right Ear: Hearing, ear canal and external ear normal. There is impacted cerumen. Tympanic membrane is retracted. Tympanic membrane has decreased mobility.      Left Ear: Hearing, ear canal and external ear normal. There is impacted cerumen. Tympanic membrane is retracted. Tympanic membrane has decreased mobility.      Nose: Septal deviation (To the right), mucosal edema (cyanotic, boggy inferior turbinates bilaterally) and rhinorrhea (clear mucus bilaterally) present. No nasal deformity.      Right Turbinates: Enlarged and pale.      Left Turbinates: Enlarged and pale.      Mouth/Throat:      Lips: Pink. No lesions.      Mouth: Mucous membranes are moist. No oral lesions.      Dentition: No gum lesions.      Tongue: No lesions. Tongue does not deviate from midline.      Palate: No mass and lesions.      Pharynx: Oropharynx is clear. Uvula midline.   Eyes:      General: Lids are normal.         Right eye: No discharge.         Left eye: No discharge.      Conjunctiva/sclera:      Right eye: Right conjunctiva is injected. No exudate.     Left eye: Left conjunctiva is injected. No exudate.     Pupils: Pupils are equal, round, and reactive to light.   Neck:      Thyroid: No thyroid mass or thyromegaly.       Trachea: Trachea normal. No tracheal deviation.   Cardiovascular:      Rate and Rhythm: Normal rate and regular rhythm.      Pulses: Normal pulses.      Heart sounds: Normal heart sounds.   Pulmonary:      Effort: Pulmonary effort is normal.      Breath sounds: Normal breath sounds. No stridor. No decreased breath sounds, wheezing, rhonchi or rales.   Abdominal:      General: Bowel sounds are normal.      Palpations: Abdomen is soft.      Tenderness: There is no abdominal tenderness.   Musculoskeletal:         General: Normal range of motion.      Cervical back: Normal range of motion. No muscular tenderness.   Lymphadenopathy:      Head:      Right side of head: No submental, submandibular, preauricular, posterior auricular or occipital adenopathy.      Left side of head: No submental, submandibular, preauricular, posterior auricular or occipital adenopathy.      Cervical: No cervical adenopathy.   Skin:     General: Skin is warm and dry.      Findings: No petechiae or rash.      Nails: There is no clubbing.   Neurological:      Mental Status: He is alert and oriented to person, place, and time.      Cranial Nerves: No cranial nerve deficit.      Sensory: No sensory deficit.      Gait: Gait normal.   Psychiatric:         Speech: Speech normal.         Behavior: Behavior normal. Behavior is cooperative.         Thought Content: Thought content normal.         Judgment: Judgment normal.         Procedure-cerumen impaction removed from both ears using wire loop.  The patient tolerated procedure well was discharged post procedure.      Assessment:       1. Bilateral impacted cerumen    2. Allergic rhinitis, unspecified seasonality, unspecified trigger    3. Nasal septal deviation    4. Obstructive sleep apnea treated with continuous positive airway pressure (CPAP)    5. BMI 40.0-44.9, adult        Plan:       I  am refilling the patient's fluticasone and azelastine nasal sprays.  He will continue his present drug  regimen and I will recheck him in 6 months.

## 2022-06-24 NOTE — PROCEDURES
"Ear Cerumen Removal    Date/Time: 6/24/2022 3:40 PM  Performed by: SHEFALI Oro MD  Authorized by: SHEFALI Oro MD     Time out: Immediately prior to procedure a "time out" was called to verify the correct patient, procedure, equipment, support staff and site/side marked as required.    Consent Done?:  Yes (Verbal)    Local anesthetic:  None  Location details:  Both ears  Procedure type comment:  Wire loop  Cerumen  Removal Results:  Cerumen completely removed  Patient tolerance:  Patient tolerated the procedure well with no immediate complications      "

## 2022-07-06 ENCOUNTER — OFFICE VISIT (OUTPATIENT)
Dept: PRIMARY CARE CLINIC | Facility: CLINIC | Age: 37
End: 2022-07-06
Payer: COMMERCIAL

## 2022-07-06 DIAGNOSIS — H60.333 ACUTE SWIMMER'S EAR OF BOTH SIDES: Primary | ICD-10-CM

## 2022-07-06 PROCEDURE — 4010F PR ACE/ARB THEARPY RXD/TAKEN: ICD-10-PCS | Mod: CPTII,95,, | Performed by: FAMILY MEDICINE

## 2022-07-06 PROCEDURE — 99212 PR OFFICE/OUTPT VISIT, EST, LEVL II, 10-19 MIN: ICD-10-PCS | Mod: 95,,, | Performed by: FAMILY MEDICINE

## 2022-07-06 PROCEDURE — 1160F PR REVIEW ALL MEDS BY PRESCRIBER/CLIN PHARMACIST DOCUMENTED: ICD-10-PCS | Mod: CPTII,95,, | Performed by: FAMILY MEDICINE

## 2022-07-06 PROCEDURE — 1160F RVW MEDS BY RX/DR IN RCRD: CPT | Mod: CPTII,95,, | Performed by: FAMILY MEDICINE

## 2022-07-06 PROCEDURE — 1159F MED LIST DOCD IN RCRD: CPT | Mod: CPTII,95,, | Performed by: FAMILY MEDICINE

## 2022-07-06 PROCEDURE — 1159F PR MEDICATION LIST DOCUMENTED IN MEDICAL RECORD: ICD-10-PCS | Mod: CPTII,95,, | Performed by: FAMILY MEDICINE

## 2022-07-06 PROCEDURE — 4010F ACE/ARB THERAPY RXD/TAKEN: CPT | Mod: CPTII,95,, | Performed by: FAMILY MEDICINE

## 2022-07-06 PROCEDURE — 99212 OFFICE O/P EST SF 10 MIN: CPT | Mod: 95,,, | Performed by: FAMILY MEDICINE

## 2022-07-06 RX ORDER — NEOMYCIN SULFATE, POLYMYXIN B SULFATE AND HYDROCORTISONE 10; 3.5; 1 MG/ML; MG/ML; [USP'U]/ML
4 SUSPENSION/ DROPS AURICULAR (OTIC) 4 TIMES DAILY
Qty: 10 ML | Refills: 0 | Status: SHIPPED | OUTPATIENT
Start: 2022-07-06 | End: 2022-07-11 | Stop reason: SDUPTHER

## 2022-07-06 NOTE — PROGRESS NOTES
The patient location is: [place]  The chief complaint leading to consultation is: swimmers ear    Visit type: audiovisual    Face to Face time with patient:   5 minutes of total time spent on the encounter, which includes face to face time and non-face to face time preparing to see the patient (eg, review of tests), Obtaining and/or reviewing separately obtained history, Documenting clinical information in the electronic or other health record, Independently interpreting results (not separately reported) and communicating results to the patient/family/caregiver, or Care coordination (not separately reported).         Each patient to whom he or she provides medical services by telemedicine is:  (1) informed of the relationship between the physician and patient and the respective role of any other health care provider with respect to management of the patient; and (2) notified that he or she may decline to receive medical services by telemedicine and may withdraw from such care at any time.    Notes:   History of present illness:  Patient is a 36-year-old male reports he went swimming this past weekend and developed pain involving his right ear last p.m. and this morning his left ear.  He denies fever or ear drainage.  He reports symptoms of some is here years ago.    Review of systems:  No fever    Physical exam:  General:  Alert, no acute distress    Diagnoses and all orders for this visit:    Acute swimmer's ear of both sides  -     neomycin-polymyxin-hydrocortisone (CORTISPORIN) 3.5-10,000-1 mg/mL-unit/mL-% otic suspension; Place 4 drops into both ears 4 (four) times daily.  Patient advised to follow with PCP in 2 days if symptoms not improved or if he develops fever.

## 2022-07-07 ENCOUNTER — TELEPHONE (OUTPATIENT)
Dept: BARIATRICS | Facility: CLINIC | Age: 37
End: 2022-07-07
Payer: COMMERCIAL

## 2022-07-07 NOTE — TELEPHONE ENCOUNTER
----- Message from Callie Yancey MD sent at 7/7/2022  4:42 PM CDT -----  Pt scheduled for 10:30 am 7/8/22.Please let pt know that with his current medications, and increased eye pressure, the only remaining options he would have are Saxenda or Wegovy. I cannot tell if they are covered by his insurance,but most often they are not. He may be able to find out from his health plan web site. If they are not covered, I will not have any options to offer him.     Thank you,  Dr. Yancey

## 2022-07-07 NOTE — PROGRESS NOTES
Subjective:       Patient ID: Ken Anderson is a 36 y.o. male.    Chief Complaint: Consult    CC: weight loss.     New pt to me, referred by Larisa Cooley MD  [id number] [place] [address] , with Patient Active Problem List:     ADHD (attention deficit hyperactivity disorder)     Severe episode of recurrent major depressive disorder, without psychotic features     Generalized anxiety disorder     Panic disorder without agoraphobia     Adjustment disorder with mixed emotional features     Aplastic anemia     Essential hypertension     Knee pain, bilateral     Hypertension not at goal     Major depressive disorder with single episode, in full remission     Insomnia     History of smoking     Severe obesity (BMI 35.0-39.9) with comorbidity     Obstructive sleep apnea treated with continuous positive airway pressure (CPAP)     Dysfunction of both eustachian tubes     Nasal septal deviation     Allergic rhinitis     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[facility])     Encounter for screening colonoscopy     Chronic embolism and thrombosis of left femoral vein       Lab Results       Component                Value               Date                       HGBA1C                   5.3                 03/22/2022            Lab Results       Component                Value               Date                       LDLCALC                  138.8               10/19/2020                 CREATININE               1.4                 07/06/2021               Lab Results       Component                Value               Date                       ALT                      19                  07/06/2021                 AST                      19                  07/06/2021                 ALKPHOS                  71                  07/06/2021                 BILITOT                  0.4                 07/06/2021                Current attempts at weight loss:  Has been seeing nutritionist..[facility].  Has been  "eating about 2400 jeremy a day. Exercise 5-7 hours a week with HIIT or circuit training.     Previous diet attempts: Has lost 115 Lbs with diet and exercise. Noom- lost 20 lbs. DASH, Atkins, Low sugar.     History of medication for loss:Denies.     checked today. On vyvanse filled 6/29/22. ALso on metformin.       Heaviest weight: 355#    Lightest weight:  180#    Goal weight: 225-250#       Last eye exam:     On chart review  2019. + occular htn. Pt states he has had updated eye exam and pressure was nomal in past 3 years. Last seen April 2022.    Provider: [facility]. Carlos at [facility].     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 Lives with . Pt cooks.   Breakfast: Bagel with cream cheese and smoked salmon. Eggs. Cottage cheese or greek yogurt and granola. Weekends: same.     Lunch: [place] and fruit. Pasta with veg. Weekends: eats out- same.     Dinner: Salad- greens, other veg, cheese, yogurt based dressing and a protein. If out- mediteranean food, poke, pizza     Snacks: apple and chicken, protein shake, fruit.     Beverages: water, coffee- milk, ETOH- 2-3 times a week, 1-2 drinks of vodka soda or gin and diet tonic.     Willingness to change:  8/10    Cardiac studies:     BMR: 2475    PBF:  35.6%    Review of Systems      Objective:     /84 (BP Location: Left arm, Patient Position: Sitting, BP Method: Large (Manual))   Pulse 91   Ht 6' 4" (1.93 m)   Wt (!) 151.4 kg (333 lb 11.2 oz)   SpO2 97%   BMI 40.62 kg/m²    Physical Exam  Vitals reviewed.   Constitutional:       General: He is not in acute distress.     Appearance: He is well-developed.      Comments: Muscular build with obesity   HENT:      Head: Normocephalic and atraumatic.      Mouth/Throat:      Pharynx: No oropharyngeal exudate.   Eyes:      General: No scleral icterus.     Pupils: Pupils are equal, round, and reactive to light.   Neck:      Thyroid: No thyromegaly.   Cardiovascular:      Rate and Rhythm: Normal " rate and regular rhythm.      Heart sounds: Normal heart sounds. No murmur heard.    No friction rub. No gallop.   Pulmonary:      Effort: Pulmonary effort is normal. No respiratory distress.      Breath sounds: Normal breath sounds. No wheezing.   Abdominal:      General: Bowel sounds are normal. There is no distension.      Palpations: Abdomen is soft.      Tenderness: There is no abdominal tenderness.   Musculoskeletal:         General: Normal range of motion.      Cervical back: Normal range of motion and neck supple.   Skin:     General: Skin is warm and dry.   Neurological:      Mental Status: He is alert and oriented to person, place, and time.   Psychiatric:         Behavior: Behavior normal.         Judgment: Judgment normal.         Assessment:       Problem List Items Addressed This Visit     ADHD (attention deficit hyperactivity disorder)    Essential hypertension    Generalized anxiety disorder    Obstructive sleep apnea treated with continuous positive airway pressure (CPAP)      Other Visit Diagnoses     Class 3 severe obesity with body mass index (BMI) of 40.0 to 44.9 in adult, unspecified obesity type, unspecified whether serious comorbidity present    -  Primary    Relevant Medications    semaglutide, weight loss, (WEGOVY) 0.25 mg/0.5 mL PnIj          Plan:             1. Class 3 severe obesity with body mass index (BMI) of 40.0 to 44.9 in adult, unspecified obesity type, unspecified whether serious comorbidity present    - Ambulatory referral/consult to Bariatric Medicine  - semaglutide, weight loss, (WEGOVY) 0.25 mg/0.5 mL PnIj; Inject 0.25 mg into the skin every 7 days.  Dispense: 2 mL; Refill: 0  Start Wegovy 0.25 mg once a week x 1 month. At the end of that month, the dose will continue to increase monthly.       Decrease portions as soon as you start wegovy. Avoid fried, rich or greasy foods.      Some nausea in the first 2 weeks is not unusual.     If you get pain across the upper abdomen  and around to your back, please call the office.       [email or web address] to sign up for coupon card.       You can stop metformin when you start Wegovy.      2. Attention deficit hyperactivity disorder (ADHD), predominantly hyperactive type  Avoid stimulant anorectics     3. Generalized anxiety disorder  Watch for and report mood changes with weight loss medications.       4. Essential hypertension  The current medical regimen is effective;  continue present plan and medications. Expect improvement with weight loss.     5. Obstructive sleep apnea treated with continuous positive airway pressure (CPAP)  Discussed importance of compliance with treatment, and that JUSTO does require getting closer to normal BMI range to see improvement that some other co-morbidities. Continue with, or consider, CPAP if indicated.

## 2022-07-07 NOTE — TELEPHONE ENCOUNTER
Contacted the patient. Patient was given the message from Dr. Yancey. He stated that he went and saw another provider for the eye pressure and was told it was perfect. Patient will be contacting his insurance to see if they cover the medication Wegovy. Patient was also interested in seeing if she send medication to [facility]. Message has been sent to the provider.

## 2022-07-08 ENCOUNTER — OFFICE VISIT (OUTPATIENT)
Dept: BARIATRICS | Facility: CLINIC | Age: 37
End: 2022-07-08
Payer: COMMERCIAL

## 2022-07-08 VITALS
SYSTOLIC BLOOD PRESSURE: 126 MMHG | BODY MASS INDEX: 38.36 KG/M2 | DIASTOLIC BLOOD PRESSURE: 84 MMHG | HEIGHT: 76 IN | HEART RATE: 91 BPM | WEIGHT: 315 LBS | OXYGEN SATURATION: 97 %

## 2022-07-08 DIAGNOSIS — F90.1 ATTENTION DEFICIT HYPERACTIVITY DISORDER (ADHD), PREDOMINANTLY HYPERACTIVE TYPE: ICD-10-CM

## 2022-07-08 DIAGNOSIS — I10 ESSENTIAL HYPERTENSION: ICD-10-CM

## 2022-07-08 DIAGNOSIS — E66.01 CLASS 3 SEVERE OBESITY WITH BODY MASS INDEX (BMI) OF 40.0 TO 44.9 IN ADULT, UNSPECIFIED OBESITY TYPE, UNSPECIFIED WHETHER SERIOUS COMORBIDITY PRESENT: Primary | ICD-10-CM

## 2022-07-08 DIAGNOSIS — F41.1 GENERALIZED ANXIETY DISORDER: ICD-10-CM

## 2022-07-08 DIAGNOSIS — G47.33 OBSTRUCTIVE SLEEP APNEA TREATED WITH CONTINUOUS POSITIVE AIRWAY PRESSURE (CPAP): ICD-10-CM

## 2022-07-08 PROCEDURE — 4010F PR ACE/ARB THEARPY RXD/TAKEN: ICD-10-PCS | Mod: CPTII,S$GLB,, | Performed by: INTERNAL MEDICINE

## 2022-07-08 PROCEDURE — 1159F MED LIST DOCD IN RCRD: CPT | Mod: CPTII,S$GLB,, | Performed by: INTERNAL MEDICINE

## 2022-07-08 PROCEDURE — 3074F SYST BP LT 130 MM HG: CPT | Mod: CPTII,S$GLB,, | Performed by: INTERNAL MEDICINE

## 2022-07-08 PROCEDURE — 99999 PR PBB SHADOW E&M-EST. PATIENT-LVL V: ICD-10-PCS | Mod: PBBFAC,,, | Performed by: INTERNAL MEDICINE

## 2022-07-08 PROCEDURE — 4010F ACE/ARB THERAPY RXD/TAKEN: CPT | Mod: CPTII,S$GLB,, | Performed by: INTERNAL MEDICINE

## 2022-07-08 PROCEDURE — 3074F PR MOST RECENT SYSTOLIC BLOOD PRESSURE < 130 MM HG: ICD-10-PCS | Mod: CPTII,S$GLB,, | Performed by: INTERNAL MEDICINE

## 2022-07-08 PROCEDURE — 3079F PR MOST RECENT DIASTOLIC BLOOD PRESSURE 80-89 MM HG: ICD-10-PCS | Mod: CPTII,S$GLB,, | Performed by: INTERNAL MEDICINE

## 2022-07-08 PROCEDURE — 1159F PR MEDICATION LIST DOCUMENTED IN MEDICAL RECORD: ICD-10-PCS | Mod: CPTII,S$GLB,, | Performed by: INTERNAL MEDICINE

## 2022-07-08 PROCEDURE — 3079F DIAST BP 80-89 MM HG: CPT | Mod: CPTII,S$GLB,, | Performed by: INTERNAL MEDICINE

## 2022-07-08 PROCEDURE — 99215 PR OFFICE/OUTPT VISIT, EST, LEVL V, 40-54 MIN: ICD-10-PCS | Mod: S$GLB,,, | Performed by: INTERNAL MEDICINE

## 2022-07-08 PROCEDURE — 3008F BODY MASS INDEX DOCD: CPT | Mod: CPTII,S$GLB,, | Performed by: INTERNAL MEDICINE

## 2022-07-08 PROCEDURE — 99215 OFFICE O/P EST HI 40 MIN: CPT | Mod: S$GLB,,, | Performed by: INTERNAL MEDICINE

## 2022-07-08 PROCEDURE — 99999 PR PBB SHADOW E&M-EST. PATIENT-LVL V: CPT | Mod: PBBFAC,,, | Performed by: INTERNAL MEDICINE

## 2022-07-08 PROCEDURE — 3008F PR BODY MASS INDEX (BMI) DOCUMENTED: ICD-10-PCS | Mod: CPTII,S$GLB,, | Performed by: INTERNAL MEDICINE

## 2022-07-08 RX ORDER — SEMAGLUTIDE 0.25 MG/.5ML
0.25 INJECTION, SOLUTION SUBCUTANEOUS
Qty: 2 ML | Refills: 0 | Status: SHIPPED | OUTPATIENT
Start: 2022-07-08 | End: 2022-07-09 | Stop reason: SDUPTHER

## 2022-07-08 NOTE — PATIENT INSTRUCTIONS
"Start Wegovy 0.25 mg once a week x 1 month. At the end of that month, the dose will continue to increase monthly.       Decrease portions as soon as you start wegovy. Avoid fried, rich or greasy foods.      Some nausea in the first 2 weeks is not unusual.     If you get pain across the upper abdomen and around to your back, please call the office.       [email or web address] to sign up for coupon card.       You can stop metformin when you start Wegovy.              1800 calorie  Meal Plan  STARCHES 80 CALORIES PER SERVING 15g CARB, 3g PROTEIN, 1g FAT  Servings per day   bread   tortilla   crackers   cooked cereals   dry cereals   pasta   rice   corn   popcorn   potato (small)   potato, mashed   sweet potato   squash, winter   cooked beans, peas, lentils (add 1 meat exchange)   1 slice   1 (6")   4-6 (3/4 oz)   1/2 cup   3/4 cup   1/2 cup   1/3 cup  1/2 cup   1 small light bag  1 (3 oz)   1/2 cup   1/3 cup   1 cup   1/2 cup Most starches are a good source of B vitamins   Choose whole grain foods such as 100% whole wheat bread and flour, brown rice, tortillas, etc. for nutrients and fiber.   Combine beans (starch & meat) with grains (starch) for their complimentary proteins and fiber   Combine grains (starch) with milk (milk) or cheese (meat) to compliment proteins     5   FRUIT 60 CALORIES PER SERVING 15g CARB    fresh fruit   banana  melon (cubes)   berries  canned fruit   dried fruit    1 small   1/2  ½ cup   ¾ cup  ½ cup   ¼ cup    Choose whole fruits for fiber   No fruit juices   2   DAIRY  CALORIES PER SERVING 12g CARB, 8g PROTEIN, 0-8g FAT    milk   yogurt  Protein soy or almond milk 1 cup   1 cup   1 cup Use unsweetened almond or soy milk with added protein  Avoid chocolate or flavored milk  Avoid yogurt with more than 8 gms of sugar. Gms of protein should be higher than grams of sugar               2   MEAT AND SUBSTITUES  CALORIES PER SERVING 7g Protein, 0-13g FAT    Seafood,meat and fish   cheese "   cottage cheese   egg   peanut butter   tofu or tempeh  cooked beans, peas, lentils (add 1 starch)  Quinoa (add 1 starch)   Nuts and seeds (½ serving protein + 1 fat)  Nutritional yeast  Morning Star grillers 1 oz     1 oz  1/4 cup     1   1.5 Tbsp   4 oz (1/2 cup)   1/2 cup              ¼ cup            2 tablespoons    1 alyssa or ½ cup      Choose meat, fish, seafood and lower fat cheeses  Limit frying or adding fat.      13   FATS 45 CALORIES PER SERVING     oil   mayonnaise   cream cheese   salad dressing   peanuts   avocado   butter or margarine    1 tsp   1 tsp   1 Tbsp   1 Tbsp   10   1/8   1 tsp Eat less fat.   Eat less saturated fat such as animal fat found in fatter meat, cheese, and butter. Also eat less hydrogenated fat.      2-3   VEGETABLES 25 CALORIES PER SERVING 5 g CARB, 2g PROTEIN    raw vegetables   cooked vegetables   tomato or vegetable juice 1 cup   1/2 cup     1/2 cup Choose dark green leafy and deep yellow vegetables such as spinach, zuchinni, squash, mushrooms, cauliflower ,broccoli, carrots, and peppers.   Unlimited        1800 CALORIE MEAL PLAN  Eat 3 small meals per day with 1-2 small snacks to keep you full throughout the day  Aim for at least 15 gms of protein at breakfast, at least 25 grams at lunch and at least 25 grams of protein at dinner.  Snacks should contain at least 5 grams of protein  Limit starches to 1 serving per meal or snack.  Keep your carbs whole grain or whole wheat  Limit your intake of refined sugar including sugary beverages ie sweet tea, lemonade, fruit punch             Fruit Protein Dairy Starch Fats Calories   Breakfast 1 3 1 1  440   Lunch  4  2 1 470   Dinner 1 4  1 2 450   Snack  2 1 1  360   Total 2 11 2 5 3 1705     Sample breakfasts:  2 scrambled eggs (use spray), 1 cup Protein Silk soy milk, 1 slice whole wheat toast, 1 small orange  Omelet made with 1 egg, 1-ounce low fat cheese and non-starchy veggies, 1 low fat plain yogurt with ½ banana  Plain yogurt  with ¾ cup unsweetened cold cereal and ½ cup fresh fruit salad, 2 hardboiled eggs  Sample lunches:  ½ whole wheat bagel topped with 3 ounces of low fat cheese melted in oven with a wild green salad with 1 TBSP dressing  ¾ cup cooked beans with 6 whole grain crackers, 10 roasted peanuts  ½ medium baked potato with 3 ounces of low fat cheddar cheese and 1 TBSP  sour cream  1 small wheat tortilla brushed with 1 tsp olive oil then brushed with pizza sauce and 4 ounces low fat cheese, sliced mushrooms and green peppers broiled until cheese is melted.  ½ cup chopped melon    Sample Dinners:  4 ounces of tuna pan seared in 1 tsp olive oil, ½ baked small sweet potato and 2 small plums  ½ cup chick peas, 1 cup cauliflower sauteed with 1 tbsp chopped onion, 1 tsp oil, and 2 tsp lindsey powder. Add low sodium vegetable stock to desired consistency. Serve with ½ cup brown rice  Red beans seasoned with onions and bell peppers served over cauliflower rice  1 cup cooked green or brown lentils served with ½ cup cooked quinoa and chopped tomatoes and cucumbers and 1 tbsp feta cheese  Sample Snacks:  1 cup of Protein Silk Soy milk with 3 squares arthur crackers  3/4 ounce Triscuits with 1 ounce cubed cheese  Chobani Triple Zero yogurt with ¾ cup unsweetened cereal  ½ cup edamame (shelled)         Meal Ideas for Regular Bariatric Diet  *Recipes and products available at [email or web address]      Breakfast: (15-20g protein)    - Egg white omelet: 2 egg whites or ½ cup Egg Beaters. (Optional proteins: cheese, shrimp, black beans, chicken, sliced turkey) (Optional veggies: tomatoes, salsa, spinach, mushrooms, onions, green peppers, or small slice avocado)     - Egg and sausage: 1 egg or ¼ cup Egg Beaters (any variety), with 1 alyssa or 2 links of Turkey sausage or Veggie breakfast sausage (Charlie Farms or Boca)    - Crust-less breakfast quiche: To make a glass pie dish, mix 4oz part skim Ricotta, 1 cup skim milk, and 2 eggs as your  base. Add protein: shredded cheese, sliced lean ham or turkey, turkey mitchell/sausage. Add veggies: tomato, onion, green onion, mushroom, green pepper, spinach, etc.    - Yogurt parfait: Mix 1 - 6oz container Dannon Light N Fit vanilla yogurt, with ¼ cup Kashi Go Lean cereal    - Cottage cheese and fruit: ½ cup part-skim cottage cheese or ricotta cheese topped with fresh fruit or sugar free preserves     - Yolande Pastor's Vanilla Egg custard* (add 2 Tbsp instant coffee granules to make Cappuccino Custard*)    - Hi-Protein café latte (skim milk, decaf coffee, 1 scoop protein powder). Optional to add Sugar free syrup or extract flavoring.    Lunch: (20-30g protein)    - ½ cup Black bean soup (Homemade or Progresso), with ¼ cup shredded low-fat cheese. Top with chopped tomato or fresh salsa.     - Lean deli turkey breast and low-fat sliced cheese, mustard or light douglas to moisten, rolled up together, or wrapped in a Elie lettuce leaf    - Chicken salad made from dinner leftovers, moisten with low-fat salad dressing or light douglas. Also try leftover salmon, shrimp, tuna or boiled eggs. Serve ½ cup over dark green salad    - Fat-free canned refried beans, topped with ¼ cup shredded low-fat cheese. Top with chopped tomato or fresh salsa.     - Greek salad: Top mixed greens with 1-2oz grilled chicken, tomatoes, red onions, 2-3 kalamata olives, and sprinkle lightly with feta cheese. Spritz with Balsamic vinegar to taste.     - Crust-less lunch quiche: To make a glass pie dish, mix 4oz part skim Ricotta, 1 cup skim milk, and 2 eggs as your base. Add protein: shredded cheese, sliced lean ham or turkey, shrimp, chicken. Add veggies: tomato, onion, green onion, mushroom, green pepper, spinach, artichoke, broccoli, etc.    - Pizza bake: tomato sauce, low-fat shredded mozzarella and turkey pepperoni or [place]omons mitchell. Add any veggies.    - Cucumber crab bites: Spread ¼ cup crab dip (lump crabmeat + light cream cheese and green  onions) over sliced cucumber.     - Chicken with light spinach and artichoke dip*: Puree in : 6oz cooked and drained spinach, 2 cloves garlic, 1 can cannelloni beans, ½ cup chopped green onions, 1 can drained artichoke hearts (not marinated in oil), lemon juice and basil. Mix in 2oz chopped up chicken.    Supper: (20-30g protein)    - Serve grilled fish over dark green salad tossed with low-fat dressing, served with grilled asparagus herring     - Rotisserie chicken salad: served with sliced strawberries, walnuts, fat-free feta cheese crumbles and 1 tbsp Villaloboss Own Light Raspberry [place] Vinaigrette    - Shrimp cocktail: Dip cold boiled shrimp in homemade low-sugar cocktail sauce (1/2 cup Deirdre One Carb ketchup, 2 tbsp horseradish, 1/4 tsp hot sauce, 1 tsp Worcestershire sauce, 1 tbsp freshly-squeezed lemon juice). Serve with dark green salad, walnuts, and crumbled blue cheese drizzled with olive oil and Balsamic vinegar    - Tuna Melt: Spread tuna salad onto 2 thick slices of tomato. Top with low-fat cheese and broil until cheese is melted. May also be made with chicken salad of shrimp salad. [place] with different types of cheeses.    - Homemade low-fat Chili using extra lean ground beef or ground turkey. Top with shredded cheese and salsa as desired. May add dollop fat-free sour cream if desired    - Dinner Omelet with shrimp or chicken and onion, green peppers and chives.    - No noodle lasagna: Use sliced zucchini or eggplant in place of noodles.  Layer with part skim ricotta cheese and low sugar meat sauce (use very lean ground beef or ground turkey).    - Mexican chicken bake: Bake chunks of chicken breast or thigh with taco seasoning, Pace brand enchilada sauce, green onions and low-fat cheese. Serve with ¼ cup black beans or fat free refried beans topped with chopped tomatoes or salsa.    - Dennis frozen meatballs, simmered in Classico Marinara sauce. Different flavors of salsa or  spaghetti sauce create different dishes! Sprinkle with parmesan cheese. Serve with grilled or steamed veggies, or a dark green salad.    - Simmer boneless skinless chicken thigh chunks in Classico Marinara sauce or roasted salsa until tender with chopped onion, bell pepper, garlic, mushrooms, spinach, etc.     - Hamburger, without the bun, dressed the way you like. Served with grilled or steamed veggies.    - Eggplant parmesan: Bake slices of eggplant at 350 degrees for 15 minutes. Layer tomato sauce, sliced eggplant and low-fat mozzarella cheese in a baking dish and cover with foil. Bake 30-40 more minutes or until bubbly. Uncover and bake at 400 degrees for about 15 more minutes, or until top is slightly crisp.    - Fish tacos: grilled/baked white fish, wrapped in Elie lettuce leaf, topped with salsa, shredded low-fat cheese, and light coleslaw.    Snacks: (100-200 calories; >5g protein)    - 1 low-fat cheese stick with 8 cherry tomatoes or 1 serving fresh fruit  - 4 thin slices fat-free turkey breast and 1 slice low-fat cheese  - 4 thin slices fat-free honey ham with wedge of melon  - 1/4 cup unsalted nuts with ½ cup fruit  - 6-oz container Dannon Light n Fit vanilla yogurt, topped with 1oz unsalted nuts         - apple, celery or baby carrots spread with 2 Tbsp natural peanut butter or almond butter   - apple slices with 1 oz slice low-fat cheese  - celery, cucumber, bell pepper or baby carrots dipped in ¼ cup hummus bean spread or light spinach and artichoke dip (*recipe in lunch section)  - 100 calorie bag microwave light popcorn with 3 tbsp grated parmesan cheese  - Lincoln Links Beef Steak - 14g protein! (similar to beef jerky)  - 2 wedges [place] - Light Herb & Garlic Cheese with sliced cucumber or green bell pepper  - 1/2 cup low-fat cottage cheese with ¼ cup fruit or ¼ cup salsa  - RTD Protein drinks: Atkins, Low Carb Slim Fast, EAS light, Muscle Milk Light, etc.  - Homemade Protein drinks: [employer]NC  Soy95, Isopure, Nectar, UNJURY, Whey Gourmet, etc. Mix 1 scoop powder with 8oz skim/1% milk or light soymilk.  - Protein bars: Atkins, EAS, Pure Protein, Think Thin, Detour, etc. Must have 0-4 grams sugar - Read the label.    Takeout Options: No more than twice/week  Deli - Salads (no pasta or rice), meats, cheeses. Roasted chicken. Lox (salmon)    Mexican - Platters which don't include tortillas, chips, or rice. Go easy on the beans. Example: Fajitas without the tortillas. Ask the  not to bring chips to the table if they are too tempting.    Greek - Meat or fish and vegetable, but no bread or rice. Including hummus, baba ganoush, etc, is OK. Most sit-down Greek restaurants can provide you with cucumber slices for dipping instead of parul bread.    Fast Food (Avoid as much as possible) - Salads (no croutons and limit salad dressing to 2 tbsp), grilled chicken sandwich without the bun and ask for no douglas. Neshas low fat chili or Taco Bell pintos and cheese.    BBQ - The meats are fine if you ask for sauces on the side, but most of the traditional side dishes are loaded with carbs. Michael slaw, baked beans and BBQ sauce are typically made with sugar.    Chinese - Nothing deep-fried, no rice or noodles. Many Chinese sauces have starch and sugar in them, so you'll have to use your judgement. If you find that these sauces trigger cravings, or cause Dumping, you can ask for the sauce to be made without sugar or just use soy sauce.      Tips for preparing for hurricane season:    If you are on weight loss medications, please take your medication with you in case of evacuation. Injectable medications should be transported with an ice pack if unopened or room temperature if you have started to use them.   Hurricane supplies do not necessarily need to be junk food or high in carbs or sugar. Shelf stable and healthy choices to include in your supplies could include:  Canned/packets of tuna or chicken  Apples, oranges,  banana, pears  Beef or turkey jerky  Sugar free pudding  Pickle, olives, dill relish (mix with the tuna or chicken!)  Low sodium or no salt added canned vegetables  If you get bread, get lite bread (40 calories a slice)  0 sugar sports drinks  Water  String cheese will be okay for a few days without refrigeration or in an ice chest.   Peanut or other nut butter.   Parmesan crisps  Pork skins  Protein shakes (20-30g protein, less than 5 g sugar)  Protein bars (15 or more g protein, less than 5 g sugar)  Don't forget disposable forks, spoons, plates in your supplies  If evacuated, manage stress by taking walks, reading or meditating.   If eating out make better choices when possible.   Salads with a lean protein and limited dressing, cheese or other toppings  Grilled chicken sandwich or burger without the bun.   Skip the fries!  Order water or unsweetened tea instead of soda  Grocery stores with a deli, salad/food bar can be a good quick and affordable option to replace fast food

## 2022-07-09 ENCOUNTER — PATIENT MESSAGE (OUTPATIENT)
Dept: BARIATRICS | Facility: CLINIC | Age: 37
End: 2022-07-09
Payer: COMMERCIAL

## 2022-07-09 RX ORDER — SEMAGLUTIDE 0.25 MG/.5ML
0.25 INJECTION, SOLUTION SUBCUTANEOUS
Qty: 2 ML | Refills: 0 | Status: SHIPPED | OUTPATIENT
Start: 2022-07-09 | End: 2022-08-08

## 2022-07-11 ENCOUNTER — PATIENT MESSAGE (OUTPATIENT)
Dept: INTERNAL MEDICINE | Facility: CLINIC | Age: 37
End: 2022-07-11
Payer: COMMERCIAL

## 2022-07-11 DIAGNOSIS — H60.333 ACUTE SWIMMER'S EAR OF BOTH SIDES: ICD-10-CM

## 2022-07-12 RX ORDER — NEOMYCIN SULFATE, POLYMYXIN B SULFATE AND HYDROCORTISONE 10; 3.5; 1 MG/ML; MG/ML; [USP'U]/ML
4 SUSPENSION/ DROPS AURICULAR (OTIC) 4 TIMES DAILY
Qty: 10 ML | Refills: 0 | OUTPATIENT
Start: 2022-07-12

## 2022-07-12 RX ORDER — TOPIRAMATE 25 MG/1
25-50 TABLET ORAL 2 TIMES DAILY
Qty: 60 TABLET | Refills: 2 | Status: SHIPPED | OUTPATIENT
Start: 2022-07-12 | End: 2022-09-22

## 2022-07-12 RX ORDER — NEOMYCIN SULFATE, POLYMYXIN B SULFATE AND HYDROCORTISONE 10; 3.5; 1 MG/ML; MG/ML; [USP'U]/ML
SUSPENSION/ DROPS AURICULAR (OTIC)
Qty: 10 ML | Refills: 0 | OUTPATIENT
Start: 2022-07-12

## 2022-07-13 ENCOUNTER — PATIENT MESSAGE (OUTPATIENT)
Dept: BARIATRICS | Facility: CLINIC | Age: 37
End: 2022-07-13
Payer: COMMERCIAL

## 2022-07-27 ENCOUNTER — PATIENT MESSAGE (OUTPATIENT)
Dept: BARIATRICS | Facility: CLINIC | Age: 37
End: 2022-07-27
Payer: COMMERCIAL

## 2022-07-29 ENCOUNTER — OFFICE VISIT (OUTPATIENT)
Dept: UROLOGY | Facility: CLINIC | Age: 37
End: 2022-07-29
Payer: COMMERCIAL

## 2022-07-29 DIAGNOSIS — N52.9 ERECTILE DYSFUNCTION, UNSPECIFIED ERECTILE DYSFUNCTION TYPE: Primary | ICD-10-CM

## 2022-07-29 PROCEDURE — 99213 OFFICE O/P EST LOW 20 MIN: CPT | Mod: 95,,, | Performed by: UROLOGY

## 2022-07-29 PROCEDURE — 99213 PR OFFICE/OUTPT VISIT, EST, LEVL III, 20-29 MIN: ICD-10-PCS | Mod: 95,,, | Performed by: UROLOGY

## 2022-07-29 PROCEDURE — 4010F ACE/ARB THERAPY RXD/TAKEN: CPT | Mod: CPTII,95,, | Performed by: UROLOGY

## 2022-07-29 PROCEDURE — 4010F PR ACE/ARB THEARPY RXD/TAKEN: ICD-10-PCS | Mod: CPTII,95,, | Performed by: UROLOGY

## 2022-07-29 RX ORDER — TADALAFIL 20 MG/1
20 TABLET ORAL
Qty: 30 TABLET | Refills: 11 | Status: SHIPPED | OUTPATIENT
Start: 2022-07-29 | End: 2023-10-24

## 2022-07-29 NOTE — PROGRESS NOTES
Subjective:      Ken Anderson is a 36 y.o. male who returns today regarding his ED.    Remains on Cialis with very good results. He takes it every 3 days. Very pleased.    The following portions of the patient's history were reviewed and updated as appropriate: allergies, current medications, past family history, past medical history, past social history, past surgical history and problem list.    Review of Systems  A comprehensive multipoint review of systems was negative except as otherwise stated in the HPI.     Objective:   Vitals: There were no vitals taken for this visit.    Physical Exam   General: alert and oriented, no acute distress  Respiratory: Symmetric expansion, non-labored breathing  Neuro: no gross deficits  Psych: normal judgment and insight, normal mood/affect and non-anxious    Lab Review     Lab Results   Component Value Date    WBC 4.01 01/02/2018    HGB 12.1 (L) 01/02/2018    HCT 34.4 (L) 01/02/2018    MCV 99 (H) 01/02/2018    PLT 62 (L) 01/02/2018     Lab Results   Component Value Date    CREATININE 1.4 07/06/2021    BUN 22 (H) 07/06/2021       Assessment and Plan:   1. Erectile dysfunction, unspecified erectile dysfunction type  --Continue Cialis  --FU 12 mos     The patient location is: Home  The chief complaint leading to consultation is: ED    Visit type: audiovisual    Face to Face time with patient: 7 minutes  12 minutes of total time spent on the encounter, which includes face to face time and non-face to face time preparing to see the patient (eg, review of tests), Obtaining and/or reviewing separately obtained history, Documenting clinical information in the electronic or other health record, Independently interpreting results (not separately reported) and communicating results to the patient/family/caregiver, or Care coordination (not separately reported).         Each patient to whom he or she provides medical services by telemedicine is:  (1) informed of the relationship  between the physician and patient and the respective role of any other health care provider with respect to management of the patient; and (2) notified that he or she may decline to receive medical services by telemedicine and may withdraw from such care at any time.    Notes:

## 2022-08-03 ENCOUNTER — PATIENT MESSAGE (OUTPATIENT)
Dept: BARIATRICS | Facility: CLINIC | Age: 37
End: 2022-08-03
Payer: COMMERCIAL

## 2022-08-17 ENCOUNTER — PATIENT MESSAGE (OUTPATIENT)
Dept: PULMONOLOGY | Facility: CLINIC | Age: 37
End: 2022-08-17
Payer: COMMERCIAL

## 2022-08-17 DIAGNOSIS — G47.33 OSA (OBSTRUCTIVE SLEEP APNEA): Primary | ICD-10-CM

## 2022-08-19 ENCOUNTER — PATIENT MESSAGE (OUTPATIENT)
Dept: BARIATRICS | Facility: CLINIC | Age: 37
End: 2022-08-19
Payer: COMMERCIAL

## 2022-08-19 ENCOUNTER — PATIENT MESSAGE (OUTPATIENT)
Dept: PULMONOLOGY | Facility: CLINIC | Age: 37
End: 2022-08-19
Payer: COMMERCIAL

## 2022-09-22 ENCOUNTER — OFFICE VISIT (OUTPATIENT)
Dept: BARIATRICS | Facility: CLINIC | Age: 37
End: 2022-09-22
Payer: COMMERCIAL

## 2022-09-22 VITALS
SYSTOLIC BLOOD PRESSURE: 122 MMHG | BODY MASS INDEX: 39.95 KG/M2 | WEIGHT: 315 LBS | HEART RATE: 78 BPM | DIASTOLIC BLOOD PRESSURE: 70 MMHG | OXYGEN SATURATION: 100 %

## 2022-09-22 DIAGNOSIS — E66.01 CLASS 2 SEVERE OBESITY WITH BODY MASS INDEX (BMI) OF 35 TO 39.9 WITH SERIOUS COMORBIDITY: Primary | ICD-10-CM

## 2022-09-22 PROCEDURE — 99999 PR PBB SHADOW E&M-EST. PATIENT-LVL V: ICD-10-PCS | Mod: PBBFAC,,, | Performed by: INTERNAL MEDICINE

## 2022-09-22 PROCEDURE — 3008F PR BODY MASS INDEX (BMI) DOCUMENTED: ICD-10-PCS | Mod: CPTII,S$GLB,, | Performed by: INTERNAL MEDICINE

## 2022-09-22 PROCEDURE — 1160F PR REVIEW ALL MEDS BY PRESCRIBER/CLIN PHARMACIST DOCUMENTED: ICD-10-PCS | Mod: CPTII,S$GLB,, | Performed by: INTERNAL MEDICINE

## 2022-09-22 PROCEDURE — 1160F RVW MEDS BY RX/DR IN RCRD: CPT | Mod: CPTII,S$GLB,, | Performed by: INTERNAL MEDICINE

## 2022-09-22 PROCEDURE — 4010F PR ACE/ARB THEARPY RXD/TAKEN: ICD-10-PCS | Mod: CPTII,S$GLB,, | Performed by: INTERNAL MEDICINE

## 2022-09-22 PROCEDURE — 3078F PR MOST RECENT DIASTOLIC BLOOD PRESSURE < 80 MM HG: ICD-10-PCS | Mod: CPTII,S$GLB,, | Performed by: INTERNAL MEDICINE

## 2022-09-22 PROCEDURE — 1159F PR MEDICATION LIST DOCUMENTED IN MEDICAL RECORD: ICD-10-PCS | Mod: CPTII,S$GLB,, | Performed by: INTERNAL MEDICINE

## 2022-09-22 PROCEDURE — 3008F BODY MASS INDEX DOCD: CPT | Mod: CPTII,S$GLB,, | Performed by: INTERNAL MEDICINE

## 2022-09-22 PROCEDURE — 99214 OFFICE O/P EST MOD 30 MIN: CPT | Mod: S$GLB,,, | Performed by: INTERNAL MEDICINE

## 2022-09-22 PROCEDURE — 99999 PR PBB SHADOW E&M-EST. PATIENT-LVL V: CPT | Mod: PBBFAC,,, | Performed by: INTERNAL MEDICINE

## 2022-09-22 PROCEDURE — 3074F SYST BP LT 130 MM HG: CPT | Mod: CPTII,S$GLB,, | Performed by: INTERNAL MEDICINE

## 2022-09-22 PROCEDURE — 99214 PR OFFICE/OUTPT VISIT, EST, LEVL IV, 30-39 MIN: ICD-10-PCS | Mod: S$GLB,,, | Performed by: INTERNAL MEDICINE

## 2022-09-22 PROCEDURE — 3078F DIAST BP <80 MM HG: CPT | Mod: CPTII,S$GLB,, | Performed by: INTERNAL MEDICINE

## 2022-09-22 PROCEDURE — 3074F PR MOST RECENT SYSTOLIC BLOOD PRESSURE < 130 MM HG: ICD-10-PCS | Mod: CPTII,S$GLB,, | Performed by: INTERNAL MEDICINE

## 2022-09-22 PROCEDURE — 1159F MED LIST DOCD IN RCRD: CPT | Mod: CPTII,S$GLB,, | Performed by: INTERNAL MEDICINE

## 2022-09-22 PROCEDURE — 4010F ACE/ARB THERAPY RXD/TAKEN: CPT | Mod: CPTII,S$GLB,, | Performed by: INTERNAL MEDICINE

## 2022-09-22 RX ORDER — TOPIRAMATE 50 MG/1
50 TABLET, FILM COATED ORAL 2 TIMES DAILY
Qty: 180 TABLET | Refills: 0 | Status: SHIPPED | OUTPATIENT
Start: 2022-09-22 | End: 2022-12-19 | Stop reason: SDUPTHER

## 2022-09-22 NOTE — PROGRESS NOTES
Subjective:       Patient ID: Ken Anderson is a 37 y.o. male.    Chief Complaint: No chief complaint on file.    CC: weight loss.   Pt here today for follow-up. Has lost 5.5 lbs (+1.4 lbs muscle. -7.6 lbs fat). Pt was to start 1800 jeremy pb meal planner and ended up starting topiramate 50 mg qhs. He denies SE. States he thinks about food much less. Has been under 2000 jeremy a day. Tracking on rashida. He has continued to exercise.     New BMR: 2505    New PBF: 33.6%        Initial:  BMR: 2475    PBF:  35.6%    Review of Systems      Objective:     /70   Pulse 78   Wt (!) 148.9 kg (328 lb 3.2 oz)   SpO2 100%   BMI 39.95 kg/m²    Physical Exam  Vitals reviewed.   Constitutional:       General: He is not in acute distress.     Appearance: He is well-developed.      Comments: Muscular build with obesity   HENT:      Head: Normocephalic and atraumatic.      Mouth/Throat:      Pharynx: No oropharyngeal exudate.   Eyes:      General: No scleral icterus.     Pupils: Pupils are equal, round, and reactive to light.   Neck:      Thyroid: No thyromegaly.   Cardiovascular:      Rate and Rhythm: Normal rate and regular rhythm.   Pulmonary:      Effort: Pulmonary effort is normal. No respiratory distress.   Musculoskeletal:         General: Normal range of motion.      Cervical back: Normal range of motion and neck supple.   Skin:     General: Skin is warm and dry.   Neurological:      Mental Status: He is alert and oriented to person, place, and time.   Psychiatric:         Behavior: Behavior normal.         Judgment: Judgment normal.       Assessment:       Problem List Items Addressed This Visit    None  Visit Diagnoses       Class 2 severe obesity with body mass index (BMI) of 35 to 39.9 with serious comorbidity    -  Primary            Plan:             Ken was seen today for follow-up.    Diagnoses and all orders for this visit:    Class 2 severe obesity with body mass index (BMI) of 35 to 39.9 with serious  comorbidity    Other orders  -     topiramate (TOPAMAX) 50 MG tablet; Take 1 tablet (50 mg total) by mouth 2 (two) times daily.        Patient was informed that topiramate is used for migraine prevention and seizures. Weight loss is a common side effect that is well documented. S/he understands this. S/he was informed of the potential side effects such as serious and possibly fatal rash in which case the medication should be discontinued immediately. Paresthesias, forgetfulness, fatigue, kidney stones, GI symptoms, and changes in lab values such as electrolytes, blood counts and kidney function.      Start topiramate 50 mg  morning and evening.     Continue 2000 jeremy meal planner      Snack ideas again.

## 2022-09-22 NOTE — PATIENT INSTRUCTIONS
Patient was informed that topiramate is used for migraine prevention and seizures. Weight loss is a common side effect that is well documented. S/he understands this. S/he was informed of the potential side effects such as serious and possibly fatal rash in which case the medication should be discontinued immediately. Paresthesias, forgetfulness, fatigue, kidney stones, GI symptoms, and changes in lab values such as electrolytes, blood counts and kidney function.      Start topiramate 50 mg  morning and evening.     Continue 2000 jeremy meal planner      Snack ideas     Savory  Avocado with chili powder, garlic powder and black pepper  String cheese or cheese cubes/slices  Tuna, chicken or egg salad lettuce wraps  Ham/turkey and cheese roll-up  Turkey jerky  Hard boiled egg  Steamed edamame  Olives, pickles (watch sodium)  Baked zucchini chips with salsa  Cottage cheese with tomatoes & dill  Salad with light dressing & veggies  Nuts and Seeds: Pistachios, almonds, cashews, pecans, sunflower seeds, peanuts, walnuts, pumpkin seeds, hazelnuts, brazil nuts (salt free)     Sweet  Plain yogurt: Triple Zero Oikos, Fage or Powerful with fruit, nuts, seeds, cinnamon  Sugar free jello  Sugar free popsicles  Homemade protein shake  Atkins bars/shakes  Premier protein shakes  Power crunch bar  Emerald cocoa dusted almonds (1/4 cup)     Sweet and Savory  Grilled pineapple and ham skewers  Cottage Cheese with fruit, nuts, seeds, cinnamon  Homemade smoothie with spinach, avocado, frozen fruit & unsweetened vanilla almond milk           Peanut Butter/Doyle Butter  Witwith apples, ½ banana, celery, carrots, strawberries        Hummus/Guacamole/Light Cream Cheese/Greek yogurt + Ranch powder mix        cucumber, carrots, celery, bell pepper, tomato, cauliflower, broccoli, snap peas, green        beans, hard boiled egg, turkey pepperoni slices, salami slices, ham, grilled chicken                           Tips when Cravings Hit:  Drink  water or sugar free Crystal Light when a craving begins.  Avoid eating blind: pre-portion foods instead of leaving them in their original package.  Eat without distractions: phone, tv, laptop etc.    Eat slowly, chew foods well (30 times).  Find snacks high in protein to keep you full longer.

## 2022-10-06 ENCOUNTER — PATIENT MESSAGE (OUTPATIENT)
Dept: BARIATRICS | Facility: CLINIC | Age: 37
End: 2022-10-06
Payer: COMMERCIAL

## 2022-10-10 ENCOUNTER — OFFICE VISIT (OUTPATIENT)
Dept: SPORTS MEDICINE | Facility: CLINIC | Age: 37
End: 2022-10-10
Payer: COMMERCIAL

## 2022-10-10 ENCOUNTER — TELEPHONE (OUTPATIENT)
Dept: SPORTS MEDICINE | Facility: CLINIC | Age: 37
End: 2022-10-10
Payer: COMMERCIAL

## 2022-10-10 ENCOUNTER — APPOINTMENT (OUTPATIENT)
Dept: RADIOLOGY | Facility: OTHER | Age: 37
End: 2022-10-10
Attending: PHYSICIAN ASSISTANT
Payer: COMMERCIAL

## 2022-10-10 VITALS — WEIGHT: 315 LBS | BODY MASS INDEX: 38.36 KG/M2 | HEIGHT: 76 IN

## 2022-10-10 DIAGNOSIS — M25.511 ACUTE PAIN OF RIGHT SHOULDER: ICD-10-CM

## 2022-10-10 DIAGNOSIS — M25.511 ACUTE PAIN OF RIGHT SHOULDER: Primary | ICD-10-CM

## 2022-10-10 DIAGNOSIS — M25.512 LEFT SHOULDER PAIN, UNSPECIFIED CHRONICITY: Primary | ICD-10-CM

## 2022-10-10 DIAGNOSIS — S46.211A BICEPS STRAIN, RIGHT, INITIAL ENCOUNTER: ICD-10-CM

## 2022-10-10 PROCEDURE — 1159F PR MEDICATION LIST DOCUMENTED IN MEDICAL RECORD: ICD-10-PCS | Mod: CPTII,S$GLB,, | Performed by: PHYSICIAN ASSISTANT

## 2022-10-10 PROCEDURE — 1160F PR REVIEW ALL MEDS BY PRESCRIBER/CLIN PHARMACIST DOCUMENTED: ICD-10-PCS | Mod: CPTII,S$GLB,, | Performed by: PHYSICIAN ASSISTANT

## 2022-10-10 PROCEDURE — 1160F RVW MEDS BY RX/DR IN RCRD: CPT | Mod: CPTII,S$GLB,, | Performed by: PHYSICIAN ASSISTANT

## 2022-10-10 PROCEDURE — 73030 XR SHOULDER COMPLETE 2 OR MORE VIEWS RIGHT: ICD-10-PCS | Mod: 26,RT,, | Performed by: RADIOLOGY

## 2022-10-10 PROCEDURE — 99999 PR PBB SHADOW E&M-EST. PATIENT-LVL IV: ICD-10-PCS | Mod: PBBFAC,,, | Performed by: PHYSICIAN ASSISTANT

## 2022-10-10 PROCEDURE — 73030 X-RAY EXAM OF SHOULDER: CPT | Mod: 26,RT,, | Performed by: RADIOLOGY

## 2022-10-10 PROCEDURE — 73030 X-RAY EXAM OF SHOULDER: CPT | Mod: TC,RT

## 2022-10-10 PROCEDURE — 3008F PR BODY MASS INDEX (BMI) DOCUMENTED: ICD-10-PCS | Mod: CPTII,S$GLB,, | Performed by: PHYSICIAN ASSISTANT

## 2022-10-10 PROCEDURE — 3008F BODY MASS INDEX DOCD: CPT | Mod: CPTII,S$GLB,, | Performed by: PHYSICIAN ASSISTANT

## 2022-10-10 PROCEDURE — 4010F PR ACE/ARB THEARPY RXD/TAKEN: ICD-10-PCS | Mod: CPTII,S$GLB,, | Performed by: PHYSICIAN ASSISTANT

## 2022-10-10 PROCEDURE — 4010F ACE/ARB THERAPY RXD/TAKEN: CPT | Mod: CPTII,S$GLB,, | Performed by: PHYSICIAN ASSISTANT

## 2022-10-10 PROCEDURE — 99203 PR OFFICE/OUTPT VISIT, NEW, LEVL III, 30-44 MIN: ICD-10-PCS | Mod: S$GLB,,, | Performed by: PHYSICIAN ASSISTANT

## 2022-10-10 PROCEDURE — 99203 OFFICE O/P NEW LOW 30 MIN: CPT | Mod: S$GLB,,, | Performed by: PHYSICIAN ASSISTANT

## 2022-10-10 PROCEDURE — 99999 PR PBB SHADOW E&M-EST. PATIENT-LVL IV: CPT | Mod: PBBFAC,,, | Performed by: PHYSICIAN ASSISTANT

## 2022-10-10 PROCEDURE — 1159F MED LIST DOCD IN RCRD: CPT | Mod: CPTII,S$GLB,, | Performed by: PHYSICIAN ASSISTANT

## 2022-10-10 NOTE — PROGRESS NOTES
NEW PATIENT    HISTORY OF PRESENT ILLNESS   37 y.o. Male with a history of right shoulder pain who was injured yesterday while playing kickball.  He stepped on the ball which caused him to fall backwards and land awkwardly when reaching back to brace his fall.  He states the pain seems to be worse when reaching back behind his back.  It had improved overnight but returned this morning.  He denies having any popping or instability.        - mechanical symptoms, - instability    Is NOT affecting ADLs.  Pain is 5/10 at it's worst.        PAST MEDICAL HISTORY    Past Medical History:   Diagnosis Date    ADHD (attention deficit hyperactivity disorder)     Adjustment disorder with mixed emotional features 6/29/2012    Allergic rhinitis     Anxiety state, unspecified 1/7/2013    Aplastic anemia     aplastic anemia    Blood transfusion     Depression     Essential hypertension 10/12/2015    Generalized anxiety disorder 6/29/2012    GERD (gastroesophageal reflux disease)     History of eye injury 13YRS    finger stuck ?eye    Major depressive disorder, recurrent episode, mild 6/29/2012    Panic disorder without agoraphobia 6/29/2012    Tobacco abuse disorder 5/10/2017       PAST SURGICAL HISTORY     Past Surgical History:   Procedure Laterality Date    BONE MARROW BIOPSY      COLONOSCOPY      COLONOSCOPY N/A 7/21/2021    Procedure: COLONOSCOPY;  Surgeon: Gino Pal MD;  Location: [facility] ([address] [place] [address]);  Service: Colon and Rectal;  Laterality: N/A;  Fully vacc Covid-19 - pg  2 day hold Eliquis - pg    ESOPHAGOGASTRODUODENOSCOPY         FAMILY HISTORY    Family History   Problem Relation Age of Onset    Cancer Father         skin    Skin cancer Father     Emphysema Maternal Grandmother     Prostate cancer Maternal Grandfather     Dementia Paternal Grandmother     Cancer Paternal Grandfather         liver    No Known Problems Mother     No Known Problems Sister     No Known Problems Sister     No Known Problems Brother      No Known Problems Maternal Aunt     No Known Problems Maternal Uncle     No Known Problems Paternal Aunt     No Known Problems Paternal Uncle     Melanoma Neg Hx     Psoriasis Neg Hx     Lupus Neg Hx     Eczema Neg Hx     Amblyopia Neg Hx     Blindness Neg Hx     Cataracts Neg Hx     Diabetes Neg Hx     Glaucoma Neg Hx     Hypertension Neg Hx     Macular degeneration Neg Hx     Retinal detachment Neg Hx     Strabismus Neg Hx     Stroke Neg Hx     Thyroid disease Neg Hx        SOCIAL HISTORY    Social History     Socioeconomic History    Marital status: Single   Occupational History     Employer: [employer]   Tobacco Use    Smoking status: Former     Packs/day: 0.30     Years: 7.00     Pack years: 2.10     Types: Cigarettes     Quit date: 2018     Years since quittin.1    Smokeless tobacco: Former   Substance and Sexual Activity    Alcohol use: Yes     Alcohol/week: 4.0 standard drinks     Types: 4 Standard drinks or equivalent per week     Comment: 4 week    Drug use: No    Sexual activity: Yes   Other Topics Concern    Caffeine Use Yes    Legal: Involved in criminal litigation No    Financial Status: Employed Yes    Leisure: Movie Watching Yes    Childhood History: Raised by parents Yes    Home situation: lives alone Yes    Childhood History: Other Yes    Education: More than one Bachelor's or Professional Yes    Patient has had a drink/used drugs as an eye opener in the AM No     Social Determinants of Health     Financial Resource Strain: Low Risk     Difficulty of Paying Living Expenses: Not hard at all   Food Insecurity: No Food Insecurity    Worried About Running Out of Food in the Last Year: Never true    Ran Out of Food in the Last Year: Never true   Transportation Needs: No Transportation Needs    Lack of Transportation (Medical): No    Lack of Transportation (Non-Medical): No   Physical Activity: Sufficiently Active    Days of Exercise per Week: 5 days    Minutes of Exercise  per Session: 50 min   Stress: Stress Concern Present    Feeling of Stress : To some extent   Social Connections: Unknown    Frequency of Communication with Friends and Family: More than three times a week    Frequency of Social Gatherings with Friends and Family: Three times a week    Active Member of Clubs or Organizations: Yes    Attends Club or Organization Meetings: More than 4 times per year    Marital Status:    Housing Stability: Low Risk     Unable to Pay for Housing in the Last Year: No    Number of Places Lived in the Last Year: 1    Unstable Housing in the Last Year: No       MEDICATIONS      Current Outpatient Medications:     acetaminophen (TYLENOL) 325 MG tablet, Take 325 mg by mouth every 6 (six) hours as needed for Pain., Disp: , Rfl:     albuterol (PROVENTIL/VENTOLIN HFA) 90 mcg/actuation inhaler, Inhale 2 puffs into the lungs every 6 (six) hours as needed for Wheezing. Rescue, Disp: 18 g, Rfl: 0    amLODIPine (NORVASC) 10 MG tablet, Take 1 tablet (10 mg total) by mouth once daily., Disp: 90 tablet, Rfl: 2    azelastine (ASTELIN) 137 mcg (0.1 %) nasal spray, Two sprays in each nostril, sniff until absorbed, then follow with 1 spray of fluticasone.  Use both sprays twice daily., Disp: 30 mL, Rfl: 11    buPROPion (WELLBUTRIN XL) 150 MG TB24 tablet, Take 150 mg by mouth every morning., Disp: , Rfl:     CALCIUM CITRATE/VITAMIN D3 (CITRACAL REGULAR ORAL), Take by mouth., Disp: , Rfl:     ciclopirox (LOPROX) 0.77 % Crea, APPLY TOPICALLY TO THE AFFECTED AREA OF FACE TWICE DAILY, Disp: 30 g, Rfl: 2    ciclopirox 1 % shampoo, AAA daily. Let sit x 5 min prior to rinsing., Disp: 120 mL, Rfl: 6    DESCOVY 200-25 mg Tab, Take 1 tablet by mouth once daily., Disp: , Rfl:     desvenlafaxine succinate (PRISTIQ) 100 MG Tb24, Take 100 mg by mouth., Disp: , Rfl:     dicyclomine (BENTYL) 20 mg tablet, Take 20 mg by mouth 4 (four) times daily as needed., Disp: , Rfl:     doxycycline monohydrate 100 mg Tab, Take 1  tablet (100 mg total) by mouth 2 (two) times daily., Disp: 20 tablet, Rfl: 0    ELIQUIS 5 mg Tab, Take 5 mg by mouth 2 (two) times daily., Disp: , Rfl:     enoxaparin (LOVENOX) 150 mg/mL Syrg, inject 1 syringe(150mg) into the skin bid, Disp: 5 mL, Rfl: 0    esketamine (SPRAVATO) 84 mg (28 mg x 3) Spry, , Disp: , Rfl:     flu vacc [id number] 6mos up,PF, (FLUARIX QUAD [id number], PF,) 60 mcg (15 mcg x 4)/0.5 mL Syrg, adminster, Disp: 0.5 mL, Rfl: 0    fluocinonide (LIDEX) 0.05 % external solution, AAA scalp qday - bid prn burning or itching, Disp: 60 mL, Rfl: 3    fluticasone propionate (FLONASE) 50 mcg/actuation nasal spray, One spray in each nostril twice daily after 1st using azelastine nasal spray, Disp: 18.2 mL, Rfl: 11    hydrocortisone 2.5 % cream, Apply topically 2 (two) times daily., Disp: 20 g, Rfl: 0    ketoconazole (NIZORAL) 2 % cream, APPLY EXTERNALLY TO THE AFFECTED AREA TWICE DAILY, Disp: 60 g, Rfl: 3    ketoconazole (NIZORAL) 2 % shampoo, Wash hair and beard 3x/week. Let sit x 5 min prior to rinsing, Disp: 120 mL, Rfl: 11    lisdexamfetamine (VYVANSE) 30 MG capsule, Take 1 capsule (30 mg total) by mouth every morning., Disp: 30 capsule, Rfl: 0    lisdexamfetamine (VYVANSE) 30 MG capsule, Take 1 capsule (30 mg total) by mouth every morning., Disp: 30 capsule, Rfl: 0    lisdexamfetamine (VYVANSE) 30 MG capsule, Take 1 capsule (30 mg total) by mouth every morning., Disp: 30 capsule, Rfl: 0    LORazepam (ATIVAN) 1 MG tablet, TAKE 1 TABLET(1 MG) BY MOUTH DAILY AS NEEDED FOR ANXIETY, Disp: 30 tablet, Rfl: 5    losartan (COZAAR) 100 MG tablet, TAKE 1 TABLET(100 MG) BY MOUTH EVERY DAY, Disp: 90 tablet, Rfl: 0    losartan (COZAAR) 100 MG tablet, Take 1 tablet (100 mg total) by mouth once daily., Disp: 90 tablet, Rfl: 2    metFORMIN (GLUCOPHAGE-XR) 500 MG ER 24hr tablet, SMARTSI Tablet(s) By Mouth Every Evening, Disp: , Rfl:     methylPREDNISolone (MEDROL DOSEPACK) 4 mg tablet, use as directed, Disp: 1 each,  Rfl: 0    neomycin-polymyxin-hydrocortisone (CORTISPORIN) 3.5-10,000-1 mg/mL-unit/mL-% otic suspension, Place 4 drops into both ears 4 (four) times daily., Disp: 10 mL, Rfl: 0    nystatin-triamcinolone (MYCOLOG) ointment, AAA at corner of mouth BID and continue use for 2 days after resolution of rash, Disp: 30 g, Rfl: 1    ondansetron (ZOFRAN) 8 MG tablet, TK 1 T PO QD PRN, Disp: , Rfl:     penicillin v potassium (VEETID) 250 MG tablet, Take 250 mg by mouth 2 (two) times daily., Disp: , Rfl:     ravulizumab-cwvz 10 mg/mL Soln, Inject into the vein., Disp: , Rfl:     SUPREP BOWEL PREP KIT 17.5-3.13-1.6 gram SolR, SMARTSI Milliliter(s) By Mouth Once, Disp: , Rfl:     tadalafiL (CIALIS) 20 MG Tab, Take 1 tablet (20 mg total) by mouth every 72 hours as needed (ED)., Disp: 30 tablet, Rfl: 11    TENOFOVIR ALAFENAMIDE ORAL, , Disp: , Rfl:     topiramate (TOPAMAX) 50 MG tablet, Take 1 tablet (50 mg total) by mouth 2 (two) times daily., Disp: 180 tablet, Rfl: 0    tretinoin (RETIN-A) 0.05 % cream, Apply small amount to entire face qhs. Wash off qam and apply sunscreen., Disp: 45 g, Rfl: 3    valACYclovir (VALTREX) 1000 MG tablet, TAKE 2 TABLETS BY MOUTH TWICE DAILY FOR 1 DAY; BEGIN AT FIRST SIGN OF OUTBREAK, Disp: 12 tablet, Rfl: 0    valACYclovir (VALTREX) 500 MG tablet, Take 1 tab po daily, Disp: 30 tablet, Rfl: 3    zolpidem (AMBIEN CR) 12.5 MG CR tablet, Take 12.5 mg by mouth nightly as needed., Disp: , Rfl:     ALLERGIES     Review of patient's allergies indicates:   Allergen Reactions    No known drug allergies          REVIEW OF SYSTEMS   Constitution: Negative. Negative for chills, fever and night sweats.   HENT: Negative for congestion and headaches.    Eyes: Negative for blurred vision, left vision loss and right vision loss.   Cardiovascular: Negative for chest pain and syncope.   Respiratory: Negative for cough and shortness of breath.    Endocrine: Negative for polydipsia, polyphagia and polyuria.  "  Hematologic/Lymphatic: Negative for bleeding problem. Does not bruise/bleed easily.   Skin: Negative for dry skin, itching and rash.   Musculoskeletal: Negative for falls. Positive for right shoulder pain.  Gastrointestinal: Negative for abdominal pain and bowel incontinence.   Genitourinary: Negative for bladder incontinence and nocturia.   Neurological: Negative for disturbances in coordination, loss of balance and seizures.   Psychiatric/Behavioral: Negative for depression. The patient does not have insomnia.    Allergic/Immunologic: Negative for hives and persistent infections.     PHYSICAL EXAMINATION    Vitals: Ht 6' 4" (1.93 m)   Wt (!) 148.8 kg (328 lb)   BMI 39.93 kg/m²     General: The patient appears active and healthy with no apparent physical problems.  No disturbance of mood or affect is demonstrated. Alert and Oriented.    HEENT: Eyes normal, pupils equally round, nose normal.    Resp: Equal and symmetrical chest rises. No wheezing    CV: Regular rate    Neck: Supple; nonpainful range of motion.    Extremities: no cyanosis, clubbing, edema, or diffuse swelling.  Palpable pulses, good capillary refill of the digits.  No coolness, discoloration, edema or obvious varicosities.    Skin: no lesions noted.    Lymphatic: no detected adenopathy in the upper or lower extremities.    Neurologic: normal mental status, normal reflexes, normal gait and balance.  Patient is alert and oriented to person, place and time.  No flaccidity or spasticity is noted.  No motor or sensory deficits are noted.  Light touch is intact    Orthopaedic: Shoulder Musculoskeletal Exam    Inspection    Right      Right shoulder inspection is normal.      Ecchymosis: none      Peripheral edema: none      Atrophy: none      Symmetry: symmetric      Masses: none      Skin tenting: none      Prior incision: none    Palpation    Right      Crepitus: no crepitus      Increased warmth: none      Tenderness: present        Anterior " shoulder: mild        Posterior shoulder: none        Clavicle: none        AC joint: mild        Sternoclavicular joint: none        Rotator cuff: none        Greater tuberosity: none        Trapezius: none        Medial scapula: none        Bicipital groove: mild        Proximal biceps: mild        Distal biceps: none        Lateral arm: none        Elbow: none    Range of Motion    Right      Right shoulder range of motion is normal.       Active ROM: normal and pain.       Passive ROM: normal and pain.       Passive external rotation in abduction: 60.       Internal rotation: T12.     Left      Internal rotation: T7.     Strength    Right      Right shoulder strength is normal.       External rotation: 5/5. External rotation is not affected by pain.       Internal rotation: 5/5. Internal rotation is not affected by pain.       Biceps: 5/5. Biceps are affected by pain.       Triceps: 5/5. Triceps are not affected by pain.     Neurovascular    Right      Right shoulder nerve sensation is normal.    Scapula    Right      Right shoulder scapula is normal.      Position: normal      Dyskinesia: none    Special Tests    Right      Right shoulder special tests are normal.    Rotator Cuff Signs      Neer's test: negative       Rios test: negative       Belly press test: negative       Painful arc test: positive     Biceps/bennie Signs      Arenac's test: positive       Clicking/popping: negative       Calin deformity: negative       Speed's test: positive     AC Joint Signs      Active horizontal adduction pain: negative     Instability Signs      Joint laxity: negative       IMAGING    X-ray Shoulder 2 or More Views Right  Narrative: EXAMINATION:  XR SHOULDER COMPLETE 2 OR MORE VIEWS RIGHT    CLINICAL HISTORY:  Pain in right shoulder    TECHNIQUE:  Two or three views of the right shoulder were performed.    COMPARISON:  03/11/2020.    FINDINGS:  The bones are intact.  There is no evidence for acute fracture or bone  destruction.  There is no evidence for dislocation.  No bony erosions are identified.  There are mild degenerative changes of the glenohumeral joint.  Impression: No evidence for acute fracture, bone destruction, or dislocation.    Mild degenerative changes of the glenohumeral joint.    Electronically signed by: Chilo Call MD  Date:    10/10/2022  Time:    11:34    X-ray images and report have been reviewed by me personally today.  The results were discussed with the patient.  There are no acute findings visible.    IMPRESSION       ICD-10-CM ICD-9-CM   1. Acute pain of right shoulder  M25.511 719.41   2. Biceps strain, right, initial encounter  S46.211A 840.8       MEDICATIONS PRESCRIBED      None.    RECOMMENDATIONS     Activity modification and rest from upper extremity activities until symptoms improve  Ice shoulder frequently due to inability to take oral NSAIDs (anticoagulation)  Referral to foot and ankle specialist/podiatrist for chronic plantar fasciitis  RTC in 2 weeks if symptoms persist and have not improved.      All questions were answered, pt will contact us for questions or concerns in the interim.

## 2022-10-17 ENCOUNTER — OFFICE VISIT (OUTPATIENT)
Dept: INTERNAL MEDICINE | Facility: CLINIC | Age: 37
End: 2022-10-17
Attending: FAMILY MEDICINE
Payer: COMMERCIAL

## 2022-10-17 VITALS
WEIGHT: 315 LBS | OXYGEN SATURATION: 98 % | HEART RATE: 101 BPM | SYSTOLIC BLOOD PRESSURE: 140 MMHG | HEIGHT: 76 IN | BODY MASS INDEX: 38.36 KG/M2 | DIASTOLIC BLOOD PRESSURE: 90 MMHG

## 2022-10-17 DIAGNOSIS — J06.9 VIRAL UPPER RESPIRATORY TRACT INFECTION: Primary | ICD-10-CM

## 2022-10-17 DIAGNOSIS — F41.1 GENERALIZED ANXIETY DISORDER: ICD-10-CM

## 2022-10-17 DIAGNOSIS — I10 ESSENTIAL HYPERTENSION: ICD-10-CM

## 2022-10-17 DIAGNOSIS — D61.9 APLASTIC ANEMIA: ICD-10-CM

## 2022-10-17 LAB
CTP QC/QA: YES
CTP QC/QA: YES
MOLECULAR STREP A: NEGATIVE
POC MOLECULAR INFLUENZA A AGN: NEGATIVE
POC MOLECULAR INFLUENZA B AGN: NEGATIVE

## 2022-10-17 PROCEDURE — 4010F ACE/ARB THERAPY RXD/TAKEN: CPT | Mod: CPTII,S$GLB,, | Performed by: FAMILY MEDICINE

## 2022-10-17 PROCEDURE — 99999 PR PBB SHADOW E&M-EST. PATIENT-LVL V: CPT | Mod: PBBFAC,,, | Performed by: FAMILY MEDICINE

## 2022-10-17 PROCEDURE — 87502 POCT INFLUENZA A/B MOLECULAR: ICD-10-PCS | Mod: QW,S$GLB,, | Performed by: FAMILY MEDICINE

## 2022-10-17 PROCEDURE — 3077F SYST BP >= 140 MM HG: CPT | Mod: CPTII,S$GLB,, | Performed by: FAMILY MEDICINE

## 2022-10-17 PROCEDURE — 87502 INFLUENZA DNA AMP PROBE: CPT | Mod: QW,S$GLB,, | Performed by: FAMILY MEDICINE

## 2022-10-17 PROCEDURE — 3077F PR MOST RECENT SYSTOLIC BLOOD PRESSURE >= 140 MM HG: ICD-10-PCS | Mod: CPTII,S$GLB,, | Performed by: FAMILY MEDICINE

## 2022-10-17 PROCEDURE — 1160F RVW MEDS BY RX/DR IN RCRD: CPT | Mod: CPTII,S$GLB,, | Performed by: FAMILY MEDICINE

## 2022-10-17 PROCEDURE — 99214 PR OFFICE/OUTPT VISIT, EST, LEVL IV, 30-39 MIN: ICD-10-PCS | Mod: S$GLB,,, | Performed by: FAMILY MEDICINE

## 2022-10-17 PROCEDURE — 1159F PR MEDICATION LIST DOCUMENTED IN MEDICAL RECORD: ICD-10-PCS | Mod: CPTII,S$GLB,, | Performed by: FAMILY MEDICINE

## 2022-10-17 PROCEDURE — 87651 POCT STREP A MOLECULAR: ICD-10-PCS | Mod: QW,S$GLB,, | Performed by: FAMILY MEDICINE

## 2022-10-17 PROCEDURE — 1160F PR REVIEW ALL MEDS BY PRESCRIBER/CLIN PHARMACIST DOCUMENTED: ICD-10-PCS | Mod: CPTII,S$GLB,, | Performed by: FAMILY MEDICINE

## 2022-10-17 PROCEDURE — 87651 STREP A DNA AMP PROBE: CPT | Mod: QW,S$GLB,, | Performed by: FAMILY MEDICINE

## 2022-10-17 PROCEDURE — 99214 OFFICE O/P EST MOD 30 MIN: CPT | Mod: S$GLB,,, | Performed by: FAMILY MEDICINE

## 2022-10-17 PROCEDURE — 3080F DIAST BP >= 90 MM HG: CPT | Mod: CPTII,S$GLB,, | Performed by: FAMILY MEDICINE

## 2022-10-17 PROCEDURE — 4010F PR ACE/ARB THEARPY RXD/TAKEN: ICD-10-PCS | Mod: CPTII,S$GLB,, | Performed by: FAMILY MEDICINE

## 2022-10-17 PROCEDURE — 3080F PR MOST RECENT DIASTOLIC BLOOD PRESSURE >= 90 MM HG: ICD-10-PCS | Mod: CPTII,S$GLB,, | Performed by: FAMILY MEDICINE

## 2022-10-17 PROCEDURE — 1159F MED LIST DOCD IN RCRD: CPT | Mod: CPTII,S$GLB,, | Performed by: FAMILY MEDICINE

## 2022-10-17 PROCEDURE — 99999 PR PBB SHADOW E&M-EST. PATIENT-LVL V: ICD-10-PCS | Mod: PBBFAC,,, | Performed by: FAMILY MEDICINE

## 2022-10-17 RX ORDER — CLINDAMYCIN PHOSPHATE 10 UG/ML
LOTION TOPICAL
COMMUNITY
Start: 2022-10-12

## 2022-10-17 RX ORDER — HYDROCORTISONE AND ACETIC ACID 20.75; 10.375 MG/ML; MG/ML
SOLUTION AURICULAR (OTIC)
COMMUNITY
Start: 2022-07-18

## 2022-10-17 NOTE — PROGRESS NOTES
"CHIEF COMPLAINT: Several days of sore throat    HISTORY OF PRESENT ILLNESS: The patient presents with several days of sore throat.  Patient has had some fever and chills.  Patient has some cervical lymphadenopathy and pain as well.  The patient denies a rash.  The patient denies nausea, vomiting, constipation, or diarrhea.  The patient has no known sick contacts.    He has a complicated medical history including hypertension diabetes and generalized anxiety disorder    REVIEW OF SYSTEMS:  GENERAL: No fatigability or weight loss.  SKIN: No rashes, itching or changes in color or texture of skin.  HEAD: No headaches or recent head trauma.  EYES: Visual acuity fine. No photophobia, ocular pain or diplopia.  EARS: Denies ear pain, discharge or vertigo.  NOSE: No loss of smell, no epistaxis or postnasal drip.  MOUTH & THROAT: No hoarseness or change in voice. No excessive gum bleeding.  The patient is having a sore throat.  NODES: The patient is having cervical swollen glands.  CHEST: Denies YOUSSEF, cyanosis, wheezing, cough and sputum production.  CARDIOVASCULAR: Denies chest pain, PND, orthopnea or reduced exercise tolerance.  ABDOMEN: Appetite fine. No weight loss. Denies diarrhea, abdominal pain, hematemesis or blood in stool.  URINARY: No flank pain, dysuria or hematuria.  PERIPHERAL VASCULAR: No claudication or cyanosis.  MUSCULOSKELETAL: No joint stiffness or swelling. Denies back pain.  NEUROLOGIC: No history of seizures, paralysis, alteration of gait or coordination.    MEDICATIONS: The patient's MedCard has been reviewed and reconciled.     ALLERGIES: The patients' Allergy Card has been reviewed and reconciled.    PAST MEDICAL HISTORY: Unchanged since recent note by PCP.    SOCIAL HISTORY: Unchanged since recent note by PCP.    PHYSICAL EXAMINATION:   Blood pressure (!) 140/90, pulse 101, height 6' 4" (1.93 m), weight (!) 148.8 kg (327 lb 15 oz), SpO2 98 %.    APPEARANCE: Well nourished, well developed, in no acute " distress.    HEAD: Normocephalic, atraumatic.  EYES: PERRL. EOMI.  Conjunctivae without injection and  anicteric  EARS: TM's intact. Light reflex normal. No retraction or perforation.    NOSE: Mucosa pink. Airway clear.  MOUTH & THROAT: There is bilateral tonsillar enlargement.  There is pharyngeal erythema without exudate. No stridor.  NECK: Supple.   NODES: No axillary or inguinal lymph node enlargement.  There is anterior cervical chain lymphadenopathy present.  CHEST: Lungs clear to auscultation.  No retractions are noted.  No rales or rhonchi are present.  CARDIOVASCULAR: Normal S1, S2. No rubs, murmurs or gallops.  ABDOMEN: Bowel sounds normal. Not distended. Soft. No tenderness or masses.  No ascites is noted.  MUSCULOSKELETAL:  There is no clubbing, cyanosis, or edema of the extremities x4.  There is full range of motion of the lumbar spine.  There is full range of motion of the extremities x4.  There is no deformity noted.    NEUROLOGIC:       Normal speech development.      Hearing normal.      Normal gait.      Motor and sensory exams grossly normal.  PSYCHIATRIC: Patient is alert and oriented x3.  Thought processes are all normal.  There is no homicidality.  There is no suicidality.  There is no evidence of psychosis.    LABORATORY/RADIOLOGY: Chart reviewed.  COVID-19 and flu swabs negative    ASSESSMENT:   Pharyngitis, acute  Hypertension  NADIA  Aplastic anemia    PLAN:  He is reassured symptoms should be self-limited

## 2022-10-21 ENCOUNTER — OFFICE VISIT (OUTPATIENT)
Dept: ORTHOPEDICS | Facility: CLINIC | Age: 37
End: 2022-10-21
Payer: COMMERCIAL

## 2022-10-21 VITALS — BODY MASS INDEX: 38.36 KG/M2 | WEIGHT: 315 LBS | HEIGHT: 76 IN

## 2022-10-21 DIAGNOSIS — G89.29 CHRONIC HEEL PAIN, UNSPECIFIED LATERALITY: ICD-10-CM

## 2022-10-21 DIAGNOSIS — M72.2 BILATERAL PLANTAR FASCIITIS: Primary | ICD-10-CM

## 2022-10-21 DIAGNOSIS — M79.673 CHRONIC HEEL PAIN, UNSPECIFIED LATERALITY: ICD-10-CM

## 2022-10-21 PROCEDURE — 99999 PR PBB SHADOW E&M-EST. PATIENT-LVL V: CPT | Mod: PBBFAC,,, | Performed by: ORTHOPAEDIC SURGERY

## 2022-10-21 PROCEDURE — 4010F PR ACE/ARB THEARPY RXD/TAKEN: ICD-10-PCS | Mod: CPTII,S$GLB,, | Performed by: ORTHOPAEDIC SURGERY

## 2022-10-21 PROCEDURE — 99213 PR OFFICE/OUTPT VISIT, EST, LEVL III, 20-29 MIN: ICD-10-PCS | Mod: S$GLB,,, | Performed by: ORTHOPAEDIC SURGERY

## 2022-10-21 PROCEDURE — 1160F RVW MEDS BY RX/DR IN RCRD: CPT | Mod: CPTII,S$GLB,, | Performed by: ORTHOPAEDIC SURGERY

## 2022-10-21 PROCEDURE — 99213 OFFICE O/P EST LOW 20 MIN: CPT | Mod: S$GLB,,, | Performed by: ORTHOPAEDIC SURGERY

## 2022-10-21 PROCEDURE — 4010F ACE/ARB THERAPY RXD/TAKEN: CPT | Mod: CPTII,S$GLB,, | Performed by: ORTHOPAEDIC SURGERY

## 2022-10-21 PROCEDURE — 1159F MED LIST DOCD IN RCRD: CPT | Mod: CPTII,S$GLB,, | Performed by: ORTHOPAEDIC SURGERY

## 2022-10-21 PROCEDURE — 1159F PR MEDICATION LIST DOCUMENTED IN MEDICAL RECORD: ICD-10-PCS | Mod: CPTII,S$GLB,, | Performed by: ORTHOPAEDIC SURGERY

## 2022-10-21 PROCEDURE — 1160F PR REVIEW ALL MEDS BY PRESCRIBER/CLIN PHARMACIST DOCUMENTED: ICD-10-PCS | Mod: CPTII,S$GLB,, | Performed by: ORTHOPAEDIC SURGERY

## 2022-10-21 PROCEDURE — 99999 PR PBB SHADOW E&M-EST. PATIENT-LVL V: ICD-10-PCS | Mod: PBBFAC,,, | Performed by: ORTHOPAEDIC SURGERY

## 2022-10-21 NOTE — PROGRESS NOTES
Subjective:      Patient ID: Ken Anderson is a 37 y.o. male.    Chief Complaint:  Bilateral plantar fasciitis    HPI:  This is a 37-year-old male who presents with a history of chronic bilateral heel pain.  He states he has had heel pain for the past three years with symptoms that are consistent with plantar fasciitis.  He reports that he has been through multiple modes of treatment including shoe wear modifications, medications, corticosteroid injections, physical therapy, needling, etc..  He reports that currently he is in therapy and does feel like he has seen some improvement since he started therapy seven months ago.  He comes in for further evaluation.  He reports that the left side is typically worse than the right side but both can cause significant pain.  He describes start-up pain as well as pain with prolonged activity.  Does not report any neurogenic pain.    Past Medical History:   Diagnosis Date    ADHD (attention deficit hyperactivity disorder)     Adjustment disorder with mixed emotional features 6/29/2012    Allergic rhinitis     Anxiety state, unspecified 1/7/2013    Aplastic anemia     aplastic anemia    Blood transfusion     Depression     Essential hypertension 10/12/2015    Generalized anxiety disorder 6/29/2012    GERD (gastroesophageal reflux disease)     History of eye injury 13YRS    finger stuck ?eye    Major depressive disorder, recurrent episode, mild 6/29/2012    Panic disorder without agoraphobia 6/29/2012    Tobacco abuse disorder 5/10/2017       Current Outpatient Medications:     acetaminophen (TYLENOL) 325 MG tablet, Take 325 mg by mouth every 6 (six) hours as needed for Pain., Disp: , Rfl:     acetic acid-hydrocortisone (VOSOL-HC) otic solution, INSTILL 5 DROPS INTO BOTH EARS TWICE DAILY, Disp: , Rfl:     amLODIPine (NORVASC) 10 MG tablet, Take 1 tablet (10 mg total) by mouth once daily., Disp: 90 tablet, Rfl: 2    azelastine (ASTELIN) 137 mcg (0.1 %) nasal spray, Two  sprays in each nostril, sniff until absorbed, then follow with 1 spray of fluticasone.  Use both sprays twice daily., Disp: 30 mL, Rfl: 11    buPROPion (WELLBUTRIN XL) 150 MG TB24 tablet, Take 150 mg by mouth every morning., Disp: , Rfl:     CALCIUM CITRATE/VITAMIN D3 (CITRACAL REGULAR ORAL), Take by mouth., Disp: , Rfl:     ciclopirox (LOPROX) 0.77 % Crea, APPLY TOPICALLY TO THE AFFECTED AREA OF FACE TWICE DAILY, Disp: 30 g, Rfl: 2    ciclopirox 1 % shampoo, AAA daily. Let sit x 5 min prior to rinsing., Disp: 120 mL, Rfl: 6    clindamycin (CLEOCIN T) 1 % lotion, SMARTSI Topical Twice Daily, Disp: , Rfl:     DESCOVY 200-25 mg Tab, Take 1 tablet by mouth once daily., Disp: , Rfl:     desvenlafaxine succinate (PRISTIQ) 100 MG Tb24, Take 100 mg by mouth., Disp: , Rfl:     dicyclomine (BENTYL) 20 mg tablet, Take 20 mg by mouth 4 (four) times daily as needed., Disp: , Rfl:     doxycycline monohydrate 100 mg Tab, Take 1 tablet (100 mg total) by mouth 2 (two) times daily., Disp: 20 tablet, Rfl: 0    ELIQUIS 5 mg Tab, Take 5 mg by mouth 2 (two) times daily., Disp: , Rfl:     enoxaparin (LOVENOX) 150 mg/mL Syrg, inject 1 syringe(150mg) into the skin bid, Disp: 5 mL, Rfl: 0    esketamine (SPRAVATO) 84 mg (28 mg x 3) Spry, , Disp: , Rfl:     flu vacc [id number] 6mos up,PF, (FLUARIX QUAD [id number], PF,) 60 mcg (15 mcg x 4)/0.5 mL Syrg, adminster, Disp: 0.5 mL, Rfl: 0    fluocinonide (LIDEX) 0.05 % external solution, AAA scalp qday - bid prn burning or itching, Disp: 60 mL, Rfl: 3    fluticasone propionate (FLONASE) 50 mcg/actuation nasal spray, One spray in each nostril twice daily after 1st using azelastine nasal spray, Disp: 18.2 mL, Rfl: 11    hydrocortisone 2.5 % cream, Apply topically 2 (two) times daily., Disp: 20 g, Rfl: 0    ketoconazole (NIZORAL) 2 % cream, APPLY EXTERNALLY TO THE AFFECTED AREA TWICE DAILY, Disp: 60 g, Rfl: 3    ketoconazole (NIZORAL) 2 % shampoo, Wash hair and beard 3x/week. Let sit x 5 min prior to  rinsing, Disp: 120 mL, Rfl: 11    lisdexamfetamine (VYVANSE) 30 MG capsule, Take 1 capsule (30 mg total) by mouth every morning., Disp: 30 capsule, Rfl: 0    lisdexamfetamine (VYVANSE) 30 MG capsule, Take 1 capsule (30 mg total) by mouth every morning., Disp: 30 capsule, Rfl: 0    lisdexamfetamine (VYVANSE) 30 MG capsule, Take 1 capsule (30 mg total) by mouth every morning., Disp: 30 capsule, Rfl: 0    LORazepam (ATIVAN) 1 MG tablet, TAKE 1 TABLET(1 MG) BY MOUTH DAILY AS NEEDED FOR ANXIETY, Disp: 30 tablet, Rfl: 5    losartan (COZAAR) 100 MG tablet, TAKE 1 TABLET(100 MG) BY MOUTH EVERY DAY, Disp: 90 tablet, Rfl: 0    losartan (COZAAR) 100 MG tablet, Take 1 tablet (100 mg total) by mouth once daily., Disp: 90 tablet, Rfl: 2    metFORMIN (GLUCOPHAGE-XR) 500 MG ER 24hr tablet, SMARTSI Tablet(s) By Mouth Every Evening, Disp: , Rfl:     methylPREDNISolone (MEDROL DOSEPACK) 4 mg tablet, use as directed, Disp: 1 each, Rfl: 0    neomycin-polymyxin-hydrocortisone (CORTISPORIN) 3.5-10,000-1 mg/mL-unit/mL-% otic suspension, Place 4 drops into both ears 4 (four) times daily., Disp: 10 mL, Rfl: 0    nystatin-triamcinolone (MYCOLOG) ointment, AAA at corner of mouth BID and continue use for 2 days after resolution of rash, Disp: 30 g, Rfl: 1    ondansetron (ZOFRAN) 8 MG tablet, TK 1 T PO QD PRN, Disp: , Rfl:     penicillin v potassium (VEETID) 250 MG tablet, Take 250 mg by mouth 2 (two) times daily., Disp: , Rfl:     ravulizumab-cwvz 10 mg/mL Soln, Inject into the vein., Disp: , Rfl:     SUPREP BOWEL PREP KIT 17.5-3.13-1.6 gram SolR, SMARTSI Milliliter(s) By Mouth Once, Disp: , Rfl:     TENOFOVIR ALAFENAMIDE ORAL, once daily., Disp: , Rfl:     topiramate (TOPAMAX) 50 MG tablet, Take 1 tablet (50 mg total) by mouth 2 (two) times daily., Disp: 180 tablet, Rfl: 0    tretinoin (RETIN-A) 0.05 % cream, Apply small amount to entire face qhs. Wash off qam and apply sunscreen., Disp: 45 g, Rfl: 3    valACYclovir (VALTREX) 1000 MG  "tablet, TAKE 2 TABLETS BY MOUTH TWICE DAILY FOR 1 DAY; BEGIN AT FIRST SIGN OF OUTBREAK, Disp: 12 tablet, Rfl: 0    valACYclovir (VALTREX) 500 MG tablet, Take 1 tab po daily, Disp: 30 tablet, Rfl: 3    zolpidem (AMBIEN CR) 12.5 MG CR tablet, Take 12.5 mg by mouth nightly as needed., Disp: , Rfl:     albuterol (PROVENTIL/VENTOLIN HFA) 90 mcg/actuation inhaler, Inhale 2 puffs into the lungs every 6 (six) hours as needed for Wheezing. Rescue (Patient not taking: Reported on 10/17/2022), Disp: 18 g, Rfl: 0    tadalafiL (CIALIS) 20 MG Tab, Take 1 tablet (20 mg total) by mouth every 72 hours as needed (ED)., Disp: 30 tablet, Rfl: 11  Review of patient's allergies indicates:   Allergen Reactions    No known drug allergies        Ht 6' 4" (1.93 m)   Wt (!) 148.8 kg (328 lb 0.7 oz)   BMI 39.93 kg/m²     ROS:  Negative for chest pain, shortness of breath, fevers, or unexplained weight loss      Objective:    Ortho Exam       This is a well-developed well-nourished male who walks in today with a slight antalgic gait.  On standing inspection he has plantigrade alignment of his feet with well-maintained arches.  On sitting exam he has functional motion of his ankle and subtalar joints bilaterally.  He has mildly tight heel cords bilaterally.  He has tenderness near the origin of the plantar fascia bilaterally.  He has no tenderness over the tarsal tunnel or over the Achilles tendon insertion.  He is neurovascularly intact.      Assessment:     Imaging:  I reviewed an x-ray of his left foot that was done on 11/16/2021 which reveals of well-maintained arch on the will standing lateral view.  There is osteophyte formation near the origin of the plantar fascia on the calcaneus.  There are no significant degenerative changes        1. Bilateral plantar fasciitis  HME - OTHER    MRI Foot ([facility]) Left Without Contrast    MRI Foot ([facility]) Left Without Contrast    CANCELED: MRI Foot ([facility]) Left Without Contrast    CANCELED: " MRI Foot ([facility]) Left Without Contrast      2. Chronic heel pain, unspecified laterality  HME - OTHER    MRI Foot ([facility]) Left Without Contrast    MRI Foot ([facility]) Left Without Contrast    CANCELED: MRI Foot ([facility]) Left Without Contrast    CANCELED: MRI Foot ([facility]) Left Without Contrast              Plan:          Recommendation:  Although is symptoms are fairly typical for plantar fasciitis the chronicity of his symptoms is unusual.  It is encouraging that he is seeing some improvement with physical therapy but I was suggested obtaining an MRI of his more symptomatic left heel to make sure there is not something else going on other than plantar fasciitis.  I also dispensed a prescription for custom orthotics.  He is going to continue with his physical therapy.  He is going to have his MRI done elsewhere will let us know when the results are ready.

## 2022-10-26 ENCOUNTER — PATIENT MESSAGE (OUTPATIENT)
Dept: ORTHOPEDICS | Facility: CLINIC | Age: 37
End: 2022-10-26
Payer: COMMERCIAL

## 2022-11-17 ENCOUNTER — TELEPHONE (OUTPATIENT)
Dept: ORTHOPEDICS | Facility: CLINIC | Age: 37
End: 2022-11-17
Payer: COMMERCIAL

## 2022-11-17 NOTE — TELEPHONE ENCOUNTER
Attempted to contact pt no answerer. lvm        ----- Message from Ni Jenkins sent at 11/17/2022 11:41 AM CST -----  Regarding: pt advice  Contact: [phone]  Caller is calling in regards to a authorization and needs to speak with someone in Dr. Nunez office. Please call    [facility]  [phone]

## 2022-11-21 ENCOUNTER — TELEPHONE (OUTPATIENT)
Dept: ORTHOPEDICS | Facility: CLINIC | Age: 37
End: 2022-11-21
Payer: COMMERCIAL

## 2022-11-21 NOTE — TELEPHONE ENCOUNTER
I spoke with Evon teran/ [facility] notified her that I will contact our pre service center and I will give her a call back.          ----- Message from Erin Lugo sent at 11/21/2022  2:01 PM CST -----  Regarding: MRI AUTHORIZATION  Contact: Evon teran/ [facility]  Evon teran/ [facility] stated no authorization is in the system for the pt's MRI which is schedule for tomorrow 11/22/2022 for 6p, ask for a call from staff      Contact info  [phone]

## 2022-11-29 ENCOUNTER — TELEPHONE (OUTPATIENT)
Dept: ADMINISTRATIVE | Facility: HOSPITAL | Age: 37
End: 2022-11-29
Payer: COMMERCIAL

## 2022-12-19 ENCOUNTER — OFFICE VISIT (OUTPATIENT)
Dept: BARIATRICS | Facility: CLINIC | Age: 37
End: 2022-12-19
Payer: COMMERCIAL

## 2022-12-19 VITALS
SYSTOLIC BLOOD PRESSURE: 139 MMHG | OXYGEN SATURATION: 96 % | BODY MASS INDEX: 38.71 KG/M2 | HEART RATE: 90 BPM | WEIGHT: 315 LBS | DIASTOLIC BLOOD PRESSURE: 70 MMHG

## 2022-12-19 DIAGNOSIS — E66.01 CLASS 2 SEVERE OBESITY WITH BODY MASS INDEX (BMI) OF 35 TO 39.9 WITH SERIOUS COMORBIDITY: Primary | ICD-10-CM

## 2022-12-19 PROCEDURE — 4010F PR ACE/ARB THEARPY RXD/TAKEN: ICD-10-PCS | Mod: CPTII,S$GLB,, | Performed by: INTERNAL MEDICINE

## 2022-12-19 PROCEDURE — 3075F PR MOST RECENT SYSTOLIC BLOOD PRESS GE 130-139MM HG: ICD-10-PCS | Mod: CPTII,S$GLB,, | Performed by: INTERNAL MEDICINE

## 2022-12-19 PROCEDURE — 3008F PR BODY MASS INDEX (BMI) DOCUMENTED: ICD-10-PCS | Mod: CPTII,S$GLB,, | Performed by: INTERNAL MEDICINE

## 2022-12-19 PROCEDURE — 1160F RVW MEDS BY RX/DR IN RCRD: CPT | Mod: CPTII,S$GLB,, | Performed by: INTERNAL MEDICINE

## 2022-12-19 PROCEDURE — 99999 PR PBB SHADOW E&M-EST. PATIENT-LVL V: ICD-10-PCS | Mod: PBBFAC,,, | Performed by: INTERNAL MEDICINE

## 2022-12-19 PROCEDURE — 3075F SYST BP GE 130 - 139MM HG: CPT | Mod: CPTII,S$GLB,, | Performed by: INTERNAL MEDICINE

## 2022-12-19 PROCEDURE — 99999 PR PBB SHADOW E&M-EST. PATIENT-LVL V: CPT | Mod: PBBFAC,,, | Performed by: INTERNAL MEDICINE

## 2022-12-19 PROCEDURE — 1159F PR MEDICATION LIST DOCUMENTED IN MEDICAL RECORD: ICD-10-PCS | Mod: CPTII,S$GLB,, | Performed by: INTERNAL MEDICINE

## 2022-12-19 PROCEDURE — 99213 OFFICE O/P EST LOW 20 MIN: CPT | Mod: S$GLB,,, | Performed by: INTERNAL MEDICINE

## 2022-12-19 PROCEDURE — 1159F MED LIST DOCD IN RCRD: CPT | Mod: CPTII,S$GLB,, | Performed by: INTERNAL MEDICINE

## 2022-12-19 PROCEDURE — 3078F PR MOST RECENT DIASTOLIC BLOOD PRESSURE < 80 MM HG: ICD-10-PCS | Mod: CPTII,S$GLB,, | Performed by: INTERNAL MEDICINE

## 2022-12-19 PROCEDURE — 99213 PR OFFICE/OUTPT VISIT, EST, LEVL III, 20-29 MIN: ICD-10-PCS | Mod: S$GLB,,, | Performed by: INTERNAL MEDICINE

## 2022-12-19 PROCEDURE — 4010F ACE/ARB THERAPY RXD/TAKEN: CPT | Mod: CPTII,S$GLB,, | Performed by: INTERNAL MEDICINE

## 2022-12-19 PROCEDURE — 3078F DIAST BP <80 MM HG: CPT | Mod: CPTII,S$GLB,, | Performed by: INTERNAL MEDICINE

## 2022-12-19 PROCEDURE — 3008F BODY MASS INDEX DOCD: CPT | Mod: CPTII,S$GLB,, | Performed by: INTERNAL MEDICINE

## 2022-12-19 PROCEDURE — 1160F PR REVIEW ALL MEDS BY PRESCRIBER/CLIN PHARMACIST DOCUMENTED: ICD-10-PCS | Mod: CPTII,S$GLB,, | Performed by: INTERNAL MEDICINE

## 2022-12-19 RX ORDER — TOPIRAMATE 50 MG/1
50 TABLET, FILM COATED ORAL 2 TIMES DAILY
Qty: 180 TABLET | Refills: 0 | Status: SHIPPED | OUTPATIENT
Start: 2022-12-19 | End: 2023-03-30 | Stop reason: SDUPTHER

## 2022-12-19 NOTE — PROGRESS NOTES
Subjective:       Patient ID: Ken Anderson is a 37 y.o. male.    Chief Complaint: Follow-up      CC: weight loss.   Pt here today for follow-up. Has lost 15.7 lbs (+3.6 lbs muscle. -23.6 lbs fat). Pt was to start 1800 jeremy pb meal planner and ended up starting topiramate 50 mg BID. Only SE has been some mild trouble with word recall that has resolved. He denies SE. States he thinks about food much less. Has been under 2000 jeremy a day still. Having more protein.  Tracking on rashida. He has continued to exercise.     New BMR: 2560    New PBF: 29.7%        Initial:  BMR: 2475    PBF:  35.6%    Review of Systems      Objective:     /70 (BP Location: Right arm, Patient Position: Sitting)   Pulse 90   Wt (!) 144.2 kg (318 lb)   SpO2 96%   BMI 38.71 kg/m²    Physical Exam  Vitals reviewed.   Constitutional:       General: He is not in acute distress.     Appearance: He is well-developed.      Comments: Muscular build with obesity   HENT:      Head: Normocephalic and atraumatic.   Eyes:      General: No scleral icterus.     Pupils: Pupils are equal, round, and reactive to light.   Cardiovascular:      Rate and Rhythm: Normal rate and regular rhythm.   Pulmonary:      Effort: Pulmonary effort is normal. No respiratory distress.   Musculoskeletal:         General: Normal range of motion.      Cervical back: Normal range of motion and neck supple.   Skin:     General: Skin is warm and dry.   Neurological:      Mental Status: He is alert and oriented to person, place, and time.   Psychiatric:         Behavior: Behavior normal.         Judgment: Judgment normal.       Assessment:       Problem List Items Addressed This Visit    None  Visit Diagnoses       Class 2 severe obesity with body mass index (BMI) of 35 to 39.9 with serious comorbidity    -  Primary              Plan:             Ken was seen today for follow-up.    Diagnoses and all orders for this visit:    Class 2 severe obesity with body mass index  (BMI) of 35 to 39.9 with serious comorbidity    Other orders  -     topiramate (TOPAMAX) 50 MG tablet; Take 1 tablet (50 mg total) by mouth 2 (two) times daily.            Patient was informed that topiramate is used for migraine prevention and seizures. Weight loss is a common side effect that is well documented. S/he understands this. S/he was informed of the potential side effects such as serious and possibly fatal rash in which case the medication should be discontinued immediately. Paresthesias, forgetfulness, fatigue, kidney stones, GI symptoms, and changes in lab values such as electrolytes, blood counts and kidney function.      Start topiramate 50 mg  morning and evening.     Continue 2000 jeremy meal planner      Holiday tips given.

## 2022-12-19 NOTE — PATIENT INSTRUCTIONS
Patient was informed that topiramate is used for migraine prevention and seizures. Weight loss is a common side effect that is well documented. S/he understands this. S/he was informed of the potential side effects such as serious and possibly fatal rash in which case the medication should be discontinued immediately. Paresthesias, forgetfulness, fatigue, kidney stones, GI symptoms, and changes in lab values such as electrolytes, blood counts and kidney function.      Start topiramate 50 mg  morning and evening.     Continue 2000 jeremy meal planner      Helpful tips to survive the holidays:  Dont save yourself for the meal: Make sure you continue to eat high protein small meals every 3-4 hours to ensure to do not become over-hungry. Avoid temptation by not showing up to a holiday party or gathering hungry.   Plan ahead. Bring a dish to a party if you know there may not be an appropriate option.   Choose sugar-free drinks: Stick to water or other sugar-free beverages and make sure you are getting 6-8 cups of fluid each day (but not with meals!). Avoid alcohol, carbonated beverages, and high-fat/high-sugar beverages like hot chocolate and eggnog. Try sugar-free hot cocoa made with skim milk or water, or sugar-free spiced tea to add some holiday flair to your beverage (see sugar-free mulled cider recipe below)  Take your time: Eat mindfully. Dont graze on food throughout the day. Sit down to enjoy your small meals. Chew slowly and thoroughly. Cut your food into small pieces before eating.  Listen to your body: Stop eating as soon as you feel full. Do not feel pressured to try certain (or all) foods or to eat all of the food on your plate. Listen to your hunger cues.   REMEMBER: Make your holidays about spending time with family and friends instead of focusing gatherings around food.  Keep up your exercise routine: Make sure you continue to get regular exercise throughout the holiday season. Encourage friends and  family to be active by taking a walk together after a meal, to look at holiday lights, or to window-shop.    Good Holiday Meal Options:  Roasted Turkey, NO skin. Use low sodium broth instead of gravy.   Stuffed Bell Peppers made WITHOUT bread crumbs or Rice. Try using parmesan cheese instead  Gumbo, NO rice. Try picking out mostly the meat/seafood and vegetables with little broth.   Green Bean Casserole made with 98% fat free cream of mushroom soup and crushed almonds/pecans instead of fried onions  Side salad w/ low fat dressing. Try a different kind of salad maybe use Kale or spinach.   Roasted non-starchy vegetables like brussel sprouts, broccoli, green beans, zucchini, butternut squash, cauliflower  Cauliflower Mash (steam or roast cauliflower, puree w/ low fat cheese, dash of fat free milk and 2-3 sprays of I cant believe its not butter spray. Add garlic powder and black pepper to season). Use Low sodium broth instead of gravy.   Try Loaded Cauliflower Mash (Make cauliflower like above cauliflower mash. Top with diced turkey mitchell, ¼ cup low fat cheddar cheese and bake @ 350* F for 5-10 minutes, until cheese is melted. Top with minced chives, black pepper and garlic to taste).   Homemade cranberry sauce using Splenda or another alternative sweetener. Boil fresh cranberries and add splenda to taste. Boil until cranberries break open and then simmer until it reaches the consistency you want (less time for more watery sauce and simmer for longer to create a thicker sauce).   Deviled eggs: make using low fat douglas, mustard, DILL relish (not sweet relish).   Vegetable tray w/ Greek yogurt Ranch Dip. Mix 1 packet of [place] ranch dip mix w/ 16 oz low fat plain greek yogurt.     Good Holiday Dessert Options:  High protein Pumpkin Cheesecake (see recipe below)  Pumpkin Whip (see recipe below)  Quest Apple Pie or Cinnamon Roll flavored protein bar (warm in microwave for 10-15 seconds)  Eggnog Protein shake (see  recipe below)  Fresh fruit w/ low fat cheese  Sugar-free Jello Parfaits. Layer Red and Green sugar-free jello in cups and top w/ 2 tbsp Sugar-free cool-whip    Pumpkin Cheesecake    8 ounces fat free cream cheese, softened   2 scoops of vanilla protein powder (<4 g sugar per serving)   ¼ tsp Fine salt   2 eggs, at room temperature   1/3 cup fat free sour cream  1/3 cup fat free half and half  1 15 -ounce can pure pumpkin puree   1 tablespoon pumpkin pie spice, plus more for dusting   Unsalted nuts, crushed  *Add splenda to taste    Directions     1. Preheat the oven to 300 degrees F. Line 18 muffin cups with paper liners. Sprinkle 1 tsp crushed unsalted nuts at the bottom of each of muffin cup liner.     2. In a large bowl, beat the cream cheese, vanilla protein powder and 1/4 teaspoon fine salt on medium-high speed until smooth and creamy, 2 to 3 minutes. Scrape down the sides, reduce speed to low and beat in the eggs, 1 at a time, until combined. Beat in 1/3 cup fat free sour cream and fat free half and half. Stir in the pumpkin puree and pumpkin pie spice until smooth. Divide evenly among cookie-lined paper cups, filling almost all the way to the top.     3. Bake until the filling is just set, 40 to 45 minutes. A sharp knife inserted into the center will come out moist, but clean. Cool completely in tins on a wire rack. Refrigerate until cold, 4 hours, or overnight. Top with a dusting of pumpkin pie spice.    Recipe altered from the following recipe: http://www.spotdock.com/recipes/food-network-tonya/mini-pumpkin-cheesecakes-recipe.print.html?oc=linkback    Pumpkin Whip    Box of sugar-free vanilla pudding  Can of pumpkin puree  Pumpkin Pie spice (sprinkle to taste)  ½ cup of sugar-free Cool Whip    Directions:  Make sugar-free pudding according to package directions using fat free or 1% milk. Stir in pumpkin and cool whip. Add pumpkin pie spice to taste.     Egg Nog Protein shake    8 oz skim or 1% milk  1  scoop vanilla protein powder  1 tbsp sugar-free vanilla pudding mix  ½ tsp butter flavor extract  ½ tsp rum extract  ½ tsp cinnamon     Shake together or blend with ice and serve.     Sugar-Free Mulled Cider    3 oz diet cran-apple juice  6 oz water  1 packet sugar-free apple cider mix  ½ tsp apple pie spice  ½ tsp butter flavor extract  1 tbsp Sugar-free Syrup    Mix together. Warm if needed and serve w/ orange wedge and cinnamon stick.

## 2023-02-22 ENCOUNTER — PATIENT MESSAGE (OUTPATIENT)
Dept: BARIATRICS | Facility: CLINIC | Age: 38
End: 2023-02-22
Payer: COMMERCIAL

## 2023-03-30 ENCOUNTER — OFFICE VISIT (OUTPATIENT)
Dept: BARIATRICS | Facility: CLINIC | Age: 38
End: 2023-03-30
Payer: COMMERCIAL

## 2023-03-30 VITALS
DIASTOLIC BLOOD PRESSURE: 72 MMHG | HEIGHT: 76 IN | BODY MASS INDEX: 36.76 KG/M2 | SYSTOLIC BLOOD PRESSURE: 112 MMHG | HEART RATE: 82 BPM | OXYGEN SATURATION: 99 % | WEIGHT: 301.88 LBS

## 2023-03-30 DIAGNOSIS — E66.01 CLASS 2 SEVERE OBESITY WITH BODY MASS INDEX (BMI) OF 35 TO 39.9 WITH SERIOUS COMORBIDITY: Primary | ICD-10-CM

## 2023-03-30 PROCEDURE — 3078F PR MOST RECENT DIASTOLIC BLOOD PRESSURE < 80 MM HG: ICD-10-PCS | Mod: CPTII,S$GLB,, | Performed by: INTERNAL MEDICINE

## 2023-03-30 PROCEDURE — 4010F PR ACE/ARB THEARPY RXD/TAKEN: ICD-10-PCS | Mod: CPTII,S$GLB,, | Performed by: INTERNAL MEDICINE

## 2023-03-30 PROCEDURE — 99213 PR OFFICE/OUTPT VISIT, EST, LEVL III, 20-29 MIN: ICD-10-PCS | Mod: S$GLB,,, | Performed by: INTERNAL MEDICINE

## 2023-03-30 PROCEDURE — 3008F PR BODY MASS INDEX (BMI) DOCUMENTED: ICD-10-PCS | Mod: CPTII,S$GLB,, | Performed by: INTERNAL MEDICINE

## 2023-03-30 PROCEDURE — 3074F PR MOST RECENT SYSTOLIC BLOOD PRESSURE < 130 MM HG: ICD-10-PCS | Mod: CPTII,S$GLB,, | Performed by: INTERNAL MEDICINE

## 2023-03-30 PROCEDURE — 4010F ACE/ARB THERAPY RXD/TAKEN: CPT | Mod: CPTII,S$GLB,, | Performed by: INTERNAL MEDICINE

## 2023-03-30 PROCEDURE — 3074F SYST BP LT 130 MM HG: CPT | Mod: CPTII,S$GLB,, | Performed by: INTERNAL MEDICINE

## 2023-03-30 PROCEDURE — 1159F MED LIST DOCD IN RCRD: CPT | Mod: CPTII,S$GLB,, | Performed by: INTERNAL MEDICINE

## 2023-03-30 PROCEDURE — 3078F DIAST BP <80 MM HG: CPT | Mod: CPTII,S$GLB,, | Performed by: INTERNAL MEDICINE

## 2023-03-30 PROCEDURE — 1160F RVW MEDS BY RX/DR IN RCRD: CPT | Mod: CPTII,S$GLB,, | Performed by: INTERNAL MEDICINE

## 2023-03-30 PROCEDURE — 1159F PR MEDICATION LIST DOCUMENTED IN MEDICAL RECORD: ICD-10-PCS | Mod: CPTII,S$GLB,, | Performed by: INTERNAL MEDICINE

## 2023-03-30 PROCEDURE — 3008F BODY MASS INDEX DOCD: CPT | Mod: CPTII,S$GLB,, | Performed by: INTERNAL MEDICINE

## 2023-03-30 PROCEDURE — 1160F PR REVIEW ALL MEDS BY PRESCRIBER/CLIN PHARMACIST DOCUMENTED: ICD-10-PCS | Mod: CPTII,S$GLB,, | Performed by: INTERNAL MEDICINE

## 2023-03-30 PROCEDURE — 99999 PR PBB SHADOW E&M-EST. PATIENT-LVL V: CPT | Mod: PBBFAC,,, | Performed by: INTERNAL MEDICINE

## 2023-03-30 PROCEDURE — 99999 PR PBB SHADOW E&M-EST. PATIENT-LVL V: ICD-10-PCS | Mod: PBBFAC,,, | Performed by: INTERNAL MEDICINE

## 2023-03-30 PROCEDURE — 99213 OFFICE O/P EST LOW 20 MIN: CPT | Mod: S$GLB,,, | Performed by: INTERNAL MEDICINE

## 2023-03-30 RX ORDER — TOPIRAMATE 50 MG/1
50 TABLET, FILM COATED ORAL 2 TIMES DAILY
Qty: 180 TABLET | Refills: 0 | Status: SHIPPED | OUTPATIENT
Start: 2023-03-30 | End: 2023-05-17 | Stop reason: SDUPTHER

## 2023-03-30 NOTE — PROGRESS NOTES
"Subjective:       Patient ID: Ken Anderson is a 37 y.o. male.    Chief Complaint: Follow-up      CC: weight loss.   Pt here today for follow-up. Has lost 31.8 lbs (-2.2 lbs muscle. -28 lbs fat). Pt was to start 1800 jeremy pb meal planner and ended up starting topiramate 50 mg BID. Only SE has been some mild trouble with word recall that has resolved. He denies SE. States he thinks about food much less. Has been under 2000 jeremy a day still. Having more protein.  Tracking on rashida. He has continued to exercise 6-7 hours a week.  He was holding pristiq for a while, and had more weight loss when he resumed it.   New BMR: 2444    New PBF: 29.9%        Initial:  BMR: 2475    PBF:  35.6%    Review of Systems      Objective:     /72 (BP Location: Left arm, Patient Position: Sitting, BP Method: Large (Manual))   Pulse 82   Ht 6' 4" (1.93 m)   Wt (!) 136.9 kg (301 lb 14.4 oz)   SpO2 99%   BMI 36.75 kg/m²    Physical Exam  Vitals reviewed.   Constitutional:       General: He is not in acute distress.     Appearance: He is well-developed.      Comments: Muscular build with obesity   HENT:      Head: Normocephalic and atraumatic.   Eyes:      General: No scleral icterus.     Pupils: Pupils are equal, round, and reactive to light.   Cardiovascular:      Rate and Rhythm: Normal rate and regular rhythm.   Pulmonary:      Effort: Pulmonary effort is normal. No respiratory distress.   Musculoskeletal:         General: Normal range of motion.      Cervical back: Normal range of motion and neck supple.   Skin:     General: Skin is warm and dry.   Neurological:      Mental Status: He is alert and oriented to person, place, and time.   Psychiatric:         Behavior: Behavior normal.         Judgment: Judgment normal.       Assessment:       Problem List Items Addressed This Visit    None  Visit Diagnoses       Class 2 severe obesity with body mass index (BMI) of 35 to 39.9 with serious comorbidity    -  Primary             "    Plan:             Ken was seen today for follow-up.    Diagnoses and all orders for this visit:    Class 2 severe obesity with body mass index (BMI) of 35 to 39.9 with serious comorbidity    Other orders  -     topiramate (TOPAMAX) 50 MG tablet; Take 1 tablet (50 mg total) by mouth 2 (two) times daily.              Patient was informed that topiramate is used for migraine prevention and seizures. Weight loss is a common side effect that is well documented. S/he understands this. S/he was informed of the potential side effects such as serious and possibly fatal rash in which case the medication should be discontinued immediately. Paresthesias, forgetfulness, fatigue, kidney stones, GI symptoms, and changes in lab values such as electrolytes, blood counts and kidney function.      Start topiramate 50 mg  morning and evening.     Continue 2000 jeremy meal planner      Katelyn Garcia recipes  given.

## 2023-03-30 NOTE — PATIENT INSTRUCTIONS
Patient was informed that topiramate is used for migraine prevention and seizures. Weight loss is a common side effect that is well documented. S/he understands this. S/he was informed of the potential side effects such as serious and possibly fatal rash in which case the medication should be discontinued immediately. Paresthesias, forgetfulness, fatigue, kidney stones, GI symptoms, and changes in lab values such as electrolytes, blood counts and kidney function.       topiramate 50 mg morning and evening.     Continue 2000 jeremy meal planner    January 28, 2019  Hot Spinach and Artichoke Dip (Recipe)  BY ARLEEN CARPENTER RD, CSSD    This creamy spinach dip can double as a protein-rich, gluten-free side dish, game day appetizer or parade route snack.  Calories 85 calories    Fat 3 grams saturated fat    Prep Time 5 minutes  Cook Time10 minutes  Yield Serves 5-10 people  Ingredients  1/2 cup onion, finely chopped  3 (10 ounce) packs of frozen chopped spinach, thawed and squeezed dry  1 (8 ounce) package reduced-fat cream cheese  1 (8 ounce) carton fat-free plain Greek yogurt  1/2 cup Parmesan cheese, grated  1 (14 ounce) can artichoke hearts  1/4 teaspoon salt (optional)  1/4 teaspoon black pepper  1/8 teaspoon crushed red pepper flakes  Instructions  Lightly coat a skillet with cooking spray.  Cook and stir onion over medium heat until transparent (about 5 minutes).  Add spinach. Cook until thoroughly heated (about 1-2 minutes).  Reduce heat and add cream cheese. Stir until melted and smooth.  Stir in Greek yogurt, Parmesan cheese and artichokes. Remove from heat.  Season with salt (optional) and black and red pepper.  Serve with raw vegetables.                          January 31, 2019  Pizza Cups (Recipe)    Trying to eat better but craving pizza? These protein-packed pizza cups will do the trick.    Calories 65 calories per cup  Fat 2.7 grams per cup  Prep Time5 minutes  Cook Time 25 minutes  Yield 12 pizza  cups  Ingredients  1 ½ cups liquid egg whites  2 large eggs  1 cup fat free or low moisture shredded mozzarella cheese  37 turkey pepperonis (25 chopped and 12 sliced)  ¼ cup Parmesan cheese  ¼ tsp oregano  ¼ tsp black pepper  Instructions  Preheat oven to 350 degrees Fahrenheit.  Chop up 25 turkey pepperonis into small pieces. In a bowl, combine all ingredients except the remaining 12 sliced turkey pepperoni.  Spray a muffin pan with nonstick spray.  Place a whole sliced turkey pepperoni in the bottom of each of the 12 muffin molds.  Pour or spoon in the mixture in your bowl into each muffin mold evenly ¾ full.  Bake in the oven for 20-25 minutes. Place a toothpick in the center of the pizza cup to ensure all liquid egg has cooked. For a crispier pizza cup, leave in the oven a little longer.  Let cool for a few minutes before serving. Enjoy!

## 2023-05-12 ENCOUNTER — PATIENT MESSAGE (OUTPATIENT)
Dept: BARIATRICS | Facility: CLINIC | Age: 38
End: 2023-05-12
Payer: COMMERCIAL

## 2023-05-13 ENCOUNTER — PATIENT MESSAGE (OUTPATIENT)
Dept: BARIATRICS | Facility: CLINIC | Age: 38
End: 2023-05-13
Payer: COMMERCIAL

## 2023-05-17 RX ORDER — TOPIRAMATE 100 MG/1
100 TABLET, FILM COATED ORAL 2 TIMES DAILY
Qty: 180 TABLET | Refills: 0 | Status: SHIPPED | OUTPATIENT
Start: 2023-05-17 | End: 2023-07-20 | Stop reason: SDUPTHER

## 2023-07-03 ENCOUNTER — TELEPHONE (OUTPATIENT)
Dept: INTERNAL MEDICINE | Facility: CLINIC | Age: 38
End: 2023-07-03
Payer: COMMERCIAL

## 2023-07-03 RX ORDER — LOSARTAN POTASSIUM 100 MG/1
TABLET ORAL
Qty: 90 TABLET | Refills: 0 | Status: SHIPPED | OUTPATIENT
Start: 2023-07-03 | End: 2023-07-20 | Stop reason: ALTCHOICE

## 2023-07-20 ENCOUNTER — OFFICE VISIT (OUTPATIENT)
Dept: BARIATRICS | Facility: CLINIC | Age: 38
End: 2023-07-20
Payer: COMMERCIAL

## 2023-07-20 VITALS
OXYGEN SATURATION: 97 % | HEIGHT: 76 IN | BODY MASS INDEX: 36.68 KG/M2 | DIASTOLIC BLOOD PRESSURE: 71 MMHG | WEIGHT: 301.19 LBS | HEART RATE: 93 BPM | SYSTOLIC BLOOD PRESSURE: 114 MMHG

## 2023-07-20 DIAGNOSIS — E66.01 CLASS 2 SEVERE OBESITY WITH BODY MASS INDEX (BMI) OF 35 TO 39.9 WITH SERIOUS COMORBIDITY: Primary | ICD-10-CM

## 2023-07-20 PROCEDURE — 4010F ACE/ARB THERAPY RXD/TAKEN: CPT | Mod: CPTII,S$GLB,, | Performed by: INTERNAL MEDICINE

## 2023-07-20 PROCEDURE — 3008F BODY MASS INDEX DOCD: CPT | Mod: CPTII,S$GLB,, | Performed by: INTERNAL MEDICINE

## 2023-07-20 PROCEDURE — 1159F MED LIST DOCD IN RCRD: CPT | Mod: CPTII,S$GLB,, | Performed by: INTERNAL MEDICINE

## 2023-07-20 PROCEDURE — 3008F PR BODY MASS INDEX (BMI) DOCUMENTED: ICD-10-PCS | Mod: CPTII,S$GLB,, | Performed by: INTERNAL MEDICINE

## 2023-07-20 PROCEDURE — 3074F SYST BP LT 130 MM HG: CPT | Mod: CPTII,S$GLB,, | Performed by: INTERNAL MEDICINE

## 2023-07-20 PROCEDURE — 3078F DIAST BP <80 MM HG: CPT | Mod: CPTII,S$GLB,, | Performed by: INTERNAL MEDICINE

## 2023-07-20 PROCEDURE — 1160F PR REVIEW ALL MEDS BY PRESCRIBER/CLIN PHARMACIST DOCUMENTED: ICD-10-PCS | Mod: CPTII,S$GLB,, | Performed by: INTERNAL MEDICINE

## 2023-07-20 PROCEDURE — 4010F PR ACE/ARB THEARPY RXD/TAKEN: ICD-10-PCS | Mod: CPTII,S$GLB,, | Performed by: INTERNAL MEDICINE

## 2023-07-20 PROCEDURE — 3078F PR MOST RECENT DIASTOLIC BLOOD PRESSURE < 80 MM HG: ICD-10-PCS | Mod: CPTII,S$GLB,, | Performed by: INTERNAL MEDICINE

## 2023-07-20 PROCEDURE — 1159F PR MEDICATION LIST DOCUMENTED IN MEDICAL RECORD: ICD-10-PCS | Mod: CPTII,S$GLB,, | Performed by: INTERNAL MEDICINE

## 2023-07-20 PROCEDURE — 99214 PR OFFICE/OUTPT VISIT, EST, LEVL IV, 30-39 MIN: ICD-10-PCS | Mod: S$GLB,,, | Performed by: INTERNAL MEDICINE

## 2023-07-20 PROCEDURE — 99214 OFFICE O/P EST MOD 30 MIN: CPT | Mod: S$GLB,,, | Performed by: INTERNAL MEDICINE

## 2023-07-20 PROCEDURE — 3074F PR MOST RECENT SYSTOLIC BLOOD PRESSURE < 130 MM HG: ICD-10-PCS | Mod: CPTII,S$GLB,, | Performed by: INTERNAL MEDICINE

## 2023-07-20 PROCEDURE — 99999 PR PBB SHADOW E&M-EST. PATIENT-LVL V: ICD-10-PCS | Mod: PBBFAC,,, | Performed by: INTERNAL MEDICINE

## 2023-07-20 PROCEDURE — 1160F RVW MEDS BY RX/DR IN RCRD: CPT | Mod: CPTII,S$GLB,, | Performed by: INTERNAL MEDICINE

## 2023-07-20 PROCEDURE — 99999 PR PBB SHADOW E&M-EST. PATIENT-LVL V: CPT | Mod: PBBFAC,,, | Performed by: INTERNAL MEDICINE

## 2023-07-20 RX ORDER — CABOTEGRAVIR 600 MG/3ML
SUSPENSION,EXTENDED RELEASE VIAL (ML) INTRAMUSCULAR
COMMUNITY
Start: 2023-07-10

## 2023-07-20 RX ORDER — PEGCETACOPLAN 1080 MG/20ML
1080 INJECTION, SOLUTION SUBCUTANEOUS
COMMUNITY

## 2023-07-20 RX ORDER — TOPIRAMATE 100 MG/1
100 TABLET, FILM COATED ORAL 2 TIMES DAILY
Qty: 180 TABLET | Refills: 1 | Status: SHIPPED | OUTPATIENT
Start: 2023-07-20 | End: 2023-10-24 | Stop reason: SDUPTHER
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
## 2023-09-22 ENCOUNTER — PATIENT MESSAGE (OUTPATIENT)
Dept: BARIATRICS | Facility: CLINIC | Age: 38
End: 2023-09-22
Payer: COMMERCIAL

## 2023-09-28 RX ORDER — LOSARTAN POTASSIUM 100 MG/1
100 TABLET ORAL
Qty: 90 TABLET | Refills: 0 | Status: SHIPPED | OUTPATIENT
Start: 2023-09-28

## 2023-10-24 ENCOUNTER — OFFICE VISIT (OUTPATIENT)
Dept: BARIATRICS | Facility: CLINIC | Age: 38
End: 2023-10-24
Payer: COMMERCIAL

## 2023-10-24 VITALS
HEART RATE: 64 BPM | BODY MASS INDEX: 37.81 KG/M2 | OXYGEN SATURATION: 96 % | SYSTOLIC BLOOD PRESSURE: 125 MMHG | HEIGHT: 76 IN | DIASTOLIC BLOOD PRESSURE: 67 MMHG | WEIGHT: 310.5 LBS

## 2023-10-24 DIAGNOSIS — E66.01 CLASS 2 SEVERE OBESITY WITH BODY MASS INDEX (BMI) OF 35 TO 39.9 WITH SERIOUS COMORBIDITY: ICD-10-CM

## 2023-10-24 DIAGNOSIS — R63.5 ABNORMAL WEIGHT GAIN: Primary | ICD-10-CM

## 2023-10-24 PROCEDURE — 3008F BODY MASS INDEX DOCD: CPT | Mod: CPTII,S$GLB,, | Performed by: INTERNAL MEDICINE

## 2023-10-24 PROCEDURE — 3008F PR BODY MASS INDEX (BMI) DOCUMENTED: ICD-10-PCS | Mod: CPTII,S$GLB,, | Performed by: INTERNAL MEDICINE

## 2023-10-24 PROCEDURE — 3074F SYST BP LT 130 MM HG: CPT | Mod: CPTII,S$GLB,, | Performed by: INTERNAL MEDICINE

## 2023-10-24 PROCEDURE — 4010F PR ACE/ARB THEARPY RXD/TAKEN: ICD-10-PCS | Mod: CPTII,S$GLB,, | Performed by: INTERNAL MEDICINE

## 2023-10-24 PROCEDURE — 1159F PR MEDICATION LIST DOCUMENTED IN MEDICAL RECORD: ICD-10-PCS | Mod: CPTII,S$GLB,, | Performed by: INTERNAL MEDICINE

## 2023-10-24 PROCEDURE — 3078F PR MOST RECENT DIASTOLIC BLOOD PRESSURE < 80 MM HG: ICD-10-PCS | Mod: CPTII,S$GLB,, | Performed by: INTERNAL MEDICINE

## 2023-10-24 PROCEDURE — 3074F PR MOST RECENT SYSTOLIC BLOOD PRESSURE < 130 MM HG: ICD-10-PCS | Mod: CPTII,S$GLB,, | Performed by: INTERNAL MEDICINE

## 2023-10-24 PROCEDURE — 1159F MED LIST DOCD IN RCRD: CPT | Mod: CPTII,S$GLB,, | Performed by: INTERNAL MEDICINE

## 2023-10-24 PROCEDURE — 99214 OFFICE O/P EST MOD 30 MIN: CPT | Mod: S$GLB,,, | Performed by: INTERNAL MEDICINE

## 2023-10-24 PROCEDURE — 99999 PR PBB SHADOW E&M-EST. PATIENT-LVL V: CPT | Mod: PBBFAC,,, | Performed by: INTERNAL MEDICINE

## 2023-10-24 PROCEDURE — 4010F ACE/ARB THERAPY RXD/TAKEN: CPT | Mod: CPTII,S$GLB,, | Performed by: INTERNAL MEDICINE

## 2023-10-24 PROCEDURE — 99214 PR OFFICE/OUTPT VISIT, EST, LEVL IV, 30-39 MIN: ICD-10-PCS | Mod: S$GLB,,, | Performed by: INTERNAL MEDICINE

## 2023-10-24 PROCEDURE — 3078F DIAST BP <80 MM HG: CPT | Mod: CPTII,S$GLB,, | Performed by: INTERNAL MEDICINE

## 2023-10-24 PROCEDURE — 99999 PR PBB SHADOW E&M-EST. PATIENT-LVL V: ICD-10-PCS | Mod: PBBFAC,,, | Performed by: INTERNAL MEDICINE

## 2023-10-24 RX ORDER — TOPIRAMATE 100 MG/1
100 TABLET, FILM COATED ORAL 2 TIMES DAILY
Qty: 180 TABLET | Refills: 1 | Status: SHIPPED | OUTPATIENT
Start: 2023-10-24 | End: 2024-02-15 | Stop reason: SDUPTHER

## 2023-10-24 NOTE — PATIENT INSTRUCTIONS
Patient was informed that topiramate is used for migraine prevention and seizures. Weight loss is a common side effect that is well documented. S/he understands this. S/he was informed of the potential side effects such as serious and possibly fatal rash in which case the medication should be discontinued immediately. Paresthesias, forgetfulness, fatigue, kidney stones, GI symptoms, and changes in lab values such as electrolytes, blood counts and kidney function.      topiramate 100 mg  morning and evening.     Continue 2000 cal meal planner      Check on your insurance's formulary (drug plan) web site to see if Wegovy (semaglutide 2.4 mg) or Saxenda (liraglutide 3 mg ) are covered. You may also go to https://www.[employer]/wegovy/cost-navigator.html to check coverage, but that information may not be as accurate.       Dinner in Under 30 minutes and 400 calories.     Baked Chicken breast with Broccoli Cheese Casserole  2-3 chicken breast (approximately 6-8 oz each)    2 tsp each garlic and herb Mrs. Ismael seasoning   2 tsp your favorite creole seasoning  2 tablespoons of balsamic vinegar  2 - 10 oz packages of frozen broccoli  1 egg   ½ cup skim milk  ½ tsp paprika   ½ tsp cayenne pepper   1 can fat free cream of mushroom soup.   1 .5 cups of shredded cheddar cheese    Pre-heat oven to 450 degrees. Toss 2-3 chicken breast (approximately 6-8 oz each) in 2 tsp each garlic and herb Mrs. Dash seasoning, 2 tsp your favorite creole seasoning, and 2 tablespoons of balsamic vinegar. This can also be done up to 24 hours ahead of time, and the chicken can be left in the refrigerator to marinate. Place on a baking sheet sprayed with non-stick cooking spray and bake on 450 degrees for 10 min, then reduce heat to 350 degrees and cook an addition 10-15 minutes until chicken is cooked through.   While chicken is baking, microwave safe dish, thaw 2 - 10 oz packages of frozen broccoli. Drain any excess water, season with salt,  pepper, all-purpose seasoning of your choice. In another bowl, whisk together 1 egg, ½ cup skim milk, ½ tsp paprika, ½ tsp cayenne pepper and 1 can fat free cream of mushroom soup. Combine broccoli, soup mixture, 1 cup of shredded cheddar cheese in baking dish sprayed with cooking spray. Top with remaining cheese. Bake in the oven with chicken for about 20 min or until set cheese is melted and cisneros.     Makes 4 servings. 389 calories per serving.10 g fat, 13 g carbs, 54g protein     Sheet Pan [place] Shrimp  1 pound large shrimp, shelled, peeled and deveined.   2 tablespoon olive oil  The zest and the juice of 1 lime  ½ tsp chili powder  ½-1 tsp cayenne pepper  1 tsp cumin  ½ tsp dry oregano  1 red bell pepper  1 green bell pepper  1 red onion  2 cups mushrooms, quartered  1 avocado  Whole sharyn lettuce leaves  Preheat oven to 375 degrees.  In a bowl combine shrimp, lime juice, lime zest, cumin, cayenne pepper, chili powder, and a pinch of salt. Set aside.   Slice peppers and onions into thin strips. Toss in 1 tablespoon of olive oil. Sprinkle with salt and pepper and arrange in a single layer over 1/3 of a large sheet pan  Toss mushrooms with remaining olive oil, oregano, salt and pepper. Arrange in single layer on sheet pan. Place sheet pan in oven for about 15-20 minutes until vegetables are softened, cooked about ¾ of the way through. Remove the sheet pan from oven.  Change setting on oven to low broil.  If needed, rearrange vegetables to make room for shrimp. Arrange the shrimp in a single layer on the sheet pan and return pan to oven. After 5 minutes, flip shrimp. Cook 3-5 additional minutes, or until  Shrimp are opaque.   Serve shrimp and cooked vegetables on lettuce leaves with sliced avocado.   Makes 4 servings. Calories per servin; 23g fat, 19 g carbohydrates, 27g protein.    Zoodles Primavera with Seasoned Ricotta  8 cups zucchini noodles. These can be purchased already prepared, or you can  make them on your own with whole zucchini and a vegetable spiralizer.   1.5 cups cherry tomatoes, quartered  2 cups sliced mushrooms  1 cup chopped fresh broccoli  1 cup frozen peas and carrots  2 cloves garlic, finely chopped  16 oz part skim ricotta cheese  2 oz [place] cream cream cheese, cut into small cubes  Juice and zest of 1 lemon  1 pinch red pepper flakes    2 tsp Italian seasoning  4-5 leaves fresh basil, thinly sliced  1 tablespoon of olive oil  Parmesan cheese for topping (optional)     In a bowl, combine ricotta cheese, 1 tsp Italian seasoning, ½ teaspoon lemon zest. Season with salt and pepper to taste. Mix well. Fold in half of fresh basil. Set aside.   Heat a large skillet to medium high. Add olive oil. Sautee mushrooms until starting to become tender. Season them with salt and pepper while cooking.  Add broccoli and peas and carrots. Reduce heat to medium. Cover pan and cook for 4-5 minutes until broccoli is tender and peas and carrots are warmed through. Add [place] cheese, Italian seasoning, red pepper flakes, 1 tsp lemon zest and lemon juice. Stir until a smooth sauce is formed. Add zucchini noodles (zoodles) to skillet and toss in sauce, then add cherry tomatoes.  Separate zoodle and vegetables into 4 equal portions. Serve each with a ¼ cup serving of seasoned ricotta cheese. Sprinkle with grated parmesan cheese or fresh basil,  if desired.   Makes 4 servings. Calories per servin calories, 32 g carbs, 33 g protein, 18 g fat

## 2023-10-24 NOTE — PROGRESS NOTES
"Subjective:       Patient ID: Ken Anderson is a 38 y.o. male.    Chief Complaint: Follow-up      CC: weight loss.   Pt here today for follow-up. Has lost 22.8 lbs (-1.1 lbs muscle. -22.1 lbs fat). Pt was to start 1800 jeremy pb meal planner and ended up starting topiramate 100 mg BID. Only SE has been some mild trouble with word recall that has resolved. He denies SE. He had noted some weight gain. He had initially coorelated with with change in PReP, but had also had covid just before he noted the weight gain. He does feel like some of the weight he gained has lost. States he thinks about food much less. Has been under 2000 jeremy a day still. Having more protein. Tracking on rashida. He has continued to exercise 6-7 hours a week.  He was holding pristiq for a while, and had more weight loss when he resumed it.   He has had some diarrhea with recent med changes.   On Vyvanse filled 9/28/23.  On metformin.   New BMR: 2470    New PBF: 31%        Initial:  BMR: 2475    PBF:  35.6%    Lab Results       Component                Value               Date                       HGBA1C                   5.3                 03/22/2022            Lab Results       Component                Value               Date                       LDLCALC                  91                  04/25/2023                 CREATININE               1.4                 07/06/2021                  Review of Systems      Objective:     /67   Pulse 64   Ht 6' 4" (1.93 m)   Wt (!) 140.8 kg (310 lb 8 oz)   SpO2 96%   BMI 37.80 kg/m²    Physical Exam  Vitals reviewed.   Constitutional:       General: He is not in acute distress.     Appearance: He is well-developed.      Comments: Muscular build with obesity   HENT:      Head: Normocephalic and atraumatic.   Eyes:      General: No scleral icterus.     Pupils: Pupils are equal, round, and reactive to light.   Cardiovascular:      Rate and Rhythm: Normal rate and regular rhythm.   Pulmonary:    "   Effort: Pulmonary effort is normal. No respiratory distress.   Musculoskeletal:         General: Normal range of motion.      Cervical back: Normal range of motion and neck supple.   Skin:     General: Skin is warm and dry.   Neurological:      Mental Status: He is alert and oriented to person, place, and time.   Psychiatric:         Behavior: Behavior normal.         Judgment: Judgment normal.         Assessment:       Problem List Items Addressed This Visit    None  Visit Diagnoses       Abnormal weight gain    -  Primary    Relevant Orders    Basic Metabolic Panel    Insulin, Random    Hemoglobin A1C    Class 2 severe obesity with body mass index (BMI) of 35 to 39.9 with serious comorbidity                        Plan:             Ken was seen today for follow-up.    Diagnoses and all orders for this visit:    Abnormal weight gain  -     Basic Metabolic Panel; Future  -     Insulin, Random; Future  -     Hemoglobin A1C; Future    Class 2 severe obesity with body mass index (BMI) of 35 to 39.9 with serious comorbidity    Other orders  -     topiramate (TOPAMAX) 100 MG tablet; Take 1 tablet (100 mg total) by mouth 2 (two) times daily.                  Patient was informed that topiramate is used for migraine prevention and seizures. Weight loss is a common side effect that is well documented. S/he understands this. S/he was informed of the potential side effects such as serious and possibly fatal rash in which case the medication should be discontinued immediately. Paresthesias, forgetfulness, fatigue, kidney stones, GI symptoms, and changes in lab values such as electrolytes, blood counts and kidney function.      Start topiramate 50 mg  morning and evening.     Continue 2000 jeremy meal planner       30 min recipes given.

## 2023-10-25 ENCOUNTER — LAB VISIT (OUTPATIENT)
Dept: LAB | Facility: HOSPITAL | Age: 38
End: 2023-10-25
Attending: INTERNAL MEDICINE
Payer: COMMERCIAL

## 2023-10-25 ENCOUNTER — PATIENT MESSAGE (OUTPATIENT)
Dept: BARIATRICS | Facility: CLINIC | Age: 38
End: 2023-10-25
Payer: COMMERCIAL

## 2023-10-25 DIAGNOSIS — R63.5 ABNORMAL WEIGHT GAIN: ICD-10-CM

## 2023-10-25 LAB
ANION GAP SERPL CALC-SCNC: 9 MMOL/L (ref 8–16)
BUN SERPL-MCNC: 21 MG/DL (ref 6–20)
CALCIUM SERPL-MCNC: 9.3 MG/DL (ref 8.7–10.5)
CHLORIDE SERPL-SCNC: 110 MMOL/L (ref 95–110)
CO2 SERPL-SCNC: 20 MMOL/L (ref 23–29)
CREAT SERPL-MCNC: 0.9 MG/DL (ref 0.5–1.4)
EST. GFR  (NO RACE VARIABLE): >60 ML/MIN/1.73 M^2
ESTIMATED AVG GLUCOSE: 97 MG/DL (ref 68–131)
GLUCOSE SERPL-MCNC: 98 MG/DL (ref 70–110)
HBA1C MFR BLD: 5 % (ref 4–5.6)
INSULIN COLLECTION INTERVAL: 0
INSULIN SERPL-ACNC: 10.4 UU/ML
POTASSIUM SERPL-SCNC: 3.9 MMOL/L (ref 3.5–5.1)
SODIUM SERPL-SCNC: 139 MMOL/L (ref 136–145)

## 2023-10-25 PROCEDURE — 36415 COLL VENOUS BLD VENIPUNCTURE: CPT | Mod: PO | Performed by: INTERNAL MEDICINE

## 2023-10-25 PROCEDURE — 83036 HEMOGLOBIN GLYCOSYLATED A1C: CPT | Performed by: INTERNAL MEDICINE

## 2023-10-25 PROCEDURE — 80048 BASIC METABOLIC PNL TOTAL CA: CPT | Performed by: INTERNAL MEDICINE

## 2023-10-25 PROCEDURE — 83525 ASSAY OF INSULIN: CPT | Performed by: INTERNAL MEDICINE

## 2023-10-31 ENCOUNTER — PATIENT MESSAGE (OUTPATIENT)
Dept: BARIATRICS | Facility: CLINIC | Age: 38
End: 2023-10-31
Payer: COMMERCIAL

## 2023-10-31 RX ORDER — METFORMIN HYDROCHLORIDE 500 MG/1
1000 TABLET, EXTENDED RELEASE ORAL
Qty: 180 TABLET | Refills: 1 | Status: SHIPPED | OUTPATIENT
Start: 2023-10-31 | End: 2024-02-15 | Stop reason: SDUPTHER

## 2024-02-15 ENCOUNTER — OFFICE VISIT (OUTPATIENT)
Dept: BARIATRICS | Facility: CLINIC | Age: 39
End: 2024-02-15
Payer: COMMERCIAL

## 2024-02-15 VITALS
SYSTOLIC BLOOD PRESSURE: 114 MMHG | BODY MASS INDEX: 37.45 KG/M2 | DIASTOLIC BLOOD PRESSURE: 76 MMHG | OXYGEN SATURATION: 98 % | HEART RATE: 91 BPM | WEIGHT: 307.69 LBS

## 2024-02-15 DIAGNOSIS — E66.01 CLASS 2 SEVERE OBESITY WITH BODY MASS INDEX (BMI) OF 35 TO 39.9 WITH SERIOUS COMORBIDITY: Primary | ICD-10-CM

## 2024-02-15 PROCEDURE — 3078F DIAST BP <80 MM HG: CPT | Mod: CPTII,S$GLB,, | Performed by: INTERNAL MEDICINE

## 2024-02-15 PROCEDURE — 4010F ACE/ARB THERAPY RXD/TAKEN: CPT | Mod: CPTII,S$GLB,, | Performed by: INTERNAL MEDICINE

## 2024-02-15 PROCEDURE — 3008F BODY MASS INDEX DOCD: CPT | Mod: CPTII,S$GLB,, | Performed by: INTERNAL MEDICINE

## 2024-02-15 PROCEDURE — 99999 PR PBB SHADOW E&M-EST. PATIENT-LVL V: CPT | Mod: PBBFAC,,, | Performed by: INTERNAL MEDICINE

## 2024-02-15 PROCEDURE — 99214 OFFICE O/P EST MOD 30 MIN: CPT | Mod: S$GLB,,, | Performed by: INTERNAL MEDICINE

## 2024-02-15 PROCEDURE — 1159F MED LIST DOCD IN RCRD: CPT | Mod: CPTII,S$GLB,, | Performed by: INTERNAL MEDICINE

## 2024-02-15 PROCEDURE — 3074F SYST BP LT 130 MM HG: CPT | Mod: CPTII,S$GLB,, | Performed by: INTERNAL MEDICINE

## 2024-02-15 PROCEDURE — 1160F RVW MEDS BY RX/DR IN RCRD: CPT | Mod: CPTII,S$GLB,, | Performed by: INTERNAL MEDICINE

## 2024-02-15 RX ORDER — TOPIRAMATE 100 MG/1
100 TABLET, FILM COATED ORAL 2 TIMES DAILY
Qty: 180 TABLET | Refills: 1 | Status: SHIPPED | OUTPATIENT
Start: 2024-02-15 | End: 2025-02-14

## 2024-02-15 RX ORDER — METFORMIN HYDROCHLORIDE 500 MG/1
1000 TABLET, EXTENDED RELEASE ORAL
Qty: 180 TABLET | Refills: 1 | Status: SHIPPED | OUTPATIENT
Start: 2024-02-15 | End: 2024-05-08 | Stop reason: SDUPTHER

## 2024-02-15 NOTE — PATIENT INSTRUCTIONS
Patient was informed that topiramate is used for migraine prevention and seizures. Weight loss is a common side effect that is well documented. S/he understands this. S/he was informed of the potential side effects such as serious and possibly fatal rash in which case the medication should be discontinued immediately. Paresthesias, forgetfulness, fatigue, kidney stones, GI symptoms, and changes in lab values such as electrolytes, blood counts and kidney function.      Start topiramate 1000 mg  morning and evening.     Continue 2000 jeremy meal planner        Sample Weight Training Plan ( adapted from [facility]):    1.Goblet Squat  How to:    Stand with feet hip-width apart and hold a weight vertically in front of chest, elbows pointing toward the floor.  Push hips back and bend knees to lower into a squat.  Drive through heels to stand back up to starting position. That's 1 rep. Complete three to five reps with a heavy weight.      2.Bent-Over Row  How to:    With a dumbbell in each hand and feet under hips, hinge at hips with knees slightly bent and arms just in front of legs.  Drive one elbow back toward hips, feeling shoulder blades squeeze together, pulling weight toward side body.  Slowly lower weight back down, then repeat with other arm. That's 1 rep. Complete three to five reps with a medium-heavy weight.        3.Lateral Lunge  How to:    Holding a weight at chest or dumbbells at each side, stand up straight with feet hip-width apart.  Take a large step to the right, sit hips back, and lower down until right knee is nearly parallel with the floor. Your left leg should be straight.  Return to start. That's 1 rep. Complete 10 reps on each side.      4.Chest Press  How to:    Lie flat on back, or on a bench, with feet flat on the ground. With a dumbbell in each hand, extend arms directly over shoulders, palms facing toward feet.  Squeeze shoulder blades together and slowly bend elbows, lowering  the weights out to the side, parallel with shoulders, until elbows form 90-degree angles.  Slowly drive the dumbbells back up to start, squeezing shoulder blades the entire time. Thats 1 rep. Complete three to five reps with a medium-heavy weight.      5.Split Stance Shoulder Press    How to:    Grab a pair of dumbbells or a resistance band. Stagger stance into a wide step, one foot forward and one back with hips squared, and hold the weights or band just above shoulders, elbows close to sides.  Leaning forward ever so slightly, bend both knees, and press through front heel while simultaneously lifting the weights or band to the betsey, keeping elbows forward and arms in line with your ears.  Lower weights or band back to shoulders. That's 1 rep. Do 10 reps, alternating side for each set.        6.Alternating Reverse Lunge To Bicep Curl  How to:    Grab a pair of dumbbells and hold them at arm's length next to sides, palms facing each other. Stand tall with feet hip-width apart.  Step backward with right leg and lower body until front knee is bent 90 degrees. At the same time as you lunge, curl both dumbbells up to shoulders.  Lower the dumbbells as you return to the starting position. Step back with the other leg and repeat.That's 1 rep. Complete 12 reps with a medium weight.

## 2024-02-15 NOTE — PROGRESS NOTES
Subjective:       Patient ID: Ken Anderson is a 38 y.o. male.    Chief Complaint: Follow-up      CC: weight loss.   Pt here today for follow-up. Has lost 25.6 lbs (-0.2lbs muscle. -27.5 lbs fat). Pt was to start 1800 jeremy pb meal planner and ended up starting topiramate 100 mg BID. Only SE has been some mild trouble with word recall that has resolved. He denies SE. He had noted some weight gain. He had initially coorelated with with change in PReP, but had also had covid just before he noted the weight gain. He does feel like some of the weight he gained has lost, and thngs seemed to have stabilized with that. . Has been under 2000 jeremy a day still. Having more protein. Tracking on rashida. He has continued to exercise 6-7 hours a week.  He was holding pristiq for a while, and had more weight loss when he resumed it.   He has had some diarrhea with recent med changes.   On Vyvanse filled 2/5/24  On metformin.     New BMR: 2496    New PBF: 29.5%        Initial:  BMR: 2475    PBF:  35.6%    Lab Results       Component                Value               Date                       HGBA1C                   5.3                 03/22/2022            Lab Results       Component                Value               Date                       LDLCALC                  91                  04/25/2023                 CREATININE               1.4                 07/06/2021                  Review of Systems      Objective:     /76   Pulse 91   Wt (!) 139.6 kg (307 lb 11.2 oz)   SpO2 98%   BMI 37.45 kg/m²    Physical Exam  Vitals reviewed.   Constitutional:       General: He is not in acute distress.     Appearance: He is well-developed.      Comments: Muscular build with obesity   HENT:      Head: Normocephalic and atraumatic.   Eyes:      General: No scleral icterus.     Pupils: Pupils are equal, round, and reactive to light.   Cardiovascular:      Rate and Rhythm: Normal rate and regular rhythm.   Pulmonary:       Effort: Pulmonary effort is normal. No respiratory distress.   Musculoskeletal:         General: Normal range of motion.      Cervical back: Normal range of motion and neck supple.   Skin:     General: Skin is warm and dry.   Neurological:      Mental Status: He is alert and oriented to person, place, and time.   Psychiatric:         Behavior: Behavior normal.         Judgment: Judgment normal.         Assessment:       Problem List Items Addressed This Visit    None  Visit Diagnoses       Class 2 severe obesity with body mass index (BMI) of 35 to 39.9 with serious comorbidity    -  Primary                      Plan:             Ken was seen today for follow-up.    Diagnoses and all orders for this visit:    Class 2 severe obesity with body mass index (BMI) of 35 to 39.9 with serious comorbidity    Other orders  -     metFORMIN (GLUCOPHAGE-XR) 500 MG ER 24hr tablet; Take 2 tablets (1,000 mg total) by mouth daily with dinner or evening meal.  -     topiramate (TOPAMAX) 100 MG tablet; Take 1 tablet (100 mg total) by mouth 2 (two) times daily.            He can try increasing metformin to BID. If this does not have AE and seems to help, I can send in updated Rx.         Patient was informed that topiramate is used for migraine prevention and seizures. Weight loss is a common side effect that is well documented. S/he understands this. S/he was informed of the potential side effects such as serious and possibly fatal rash in which case the medication should be discontinued immediately. Paresthesias, forgetfulness, fatigue, kidney stones, GI symptoms, and changes in lab values such as electrolytes, blood counts and kidney function.      Start topiramate 100 mg  morning and evening.     Continue 2000 jeremy meal planner      Weight training handout given.

## 2024-03-26 ENCOUNTER — TELEPHONE (OUTPATIENT)
Dept: BARIATRICS | Facility: CLINIC | Age: 39
End: 2024-03-26
Payer: COMMERCIAL

## 2024-05-06 ENCOUNTER — PATIENT MESSAGE (OUTPATIENT)
Dept: BARIATRICS | Facility: CLINIC | Age: 39
End: 2024-05-06
Payer: COMMERCIAL

## 2024-05-08 RX ORDER — METFORMIN HYDROCHLORIDE 500 MG/1
1000 TABLET, EXTENDED RELEASE ORAL 2 TIMES DAILY WITH MEALS
Qty: 360 TABLET | Refills: 1 | Status: SHIPPED | OUTPATIENT
Start: 2024-05-08

## 2024-05-08 NOTE — TELEPHONE ENCOUNTER
Lab Results   Component Value Date    CREATININE 0.9 10/25/2023    BUN 21 (H) 10/25/2023     10/25/2023    K 3.9 10/25/2023     10/25/2023    CO2 20 (L) 10/25/2023

## 2024-09-03 ENCOUNTER — PATIENT MESSAGE (OUTPATIENT)
Dept: BARIATRICS | Facility: CLINIC | Age: 39
End: 2024-09-03
Payer: COMMERCIAL

## 2024-09-03 DIAGNOSIS — E66.01 CLASS 2 SEVERE OBESITY WITH BODY MASS INDEX (BMI) OF 35 TO 39.9 WITH SERIOUS COMORBIDITY: Primary | ICD-10-CM

## 2024-09-04 RX ORDER — TIRZEPATIDE 2.5 MG/.5ML
2.5 INJECTION, SOLUTION SUBCUTANEOUS
Qty: 4 PEN | Refills: 0 | Status: SHIPPED | OUTPATIENT
Start: 2024-09-04 | End: 2024-09-04 | Stop reason: SDUPTHER

## 2024-09-04 RX ORDER — TIRZEPATIDE 2.5 MG/.5ML
2.5 INJECTION, SOLUTION SUBCUTANEOUS
Qty: 2 ML | Refills: 0 | Status: SHIPPED | OUTPATIENT
Start: 2024-09-04

## 2024-10-02 ENCOUNTER — OFFICE VISIT (OUTPATIENT)
Dept: BARIATRICS | Facility: CLINIC | Age: 39
End: 2024-10-02
Payer: COMMERCIAL

## 2024-10-02 VITALS
WEIGHT: 302.81 LBS | SYSTOLIC BLOOD PRESSURE: 129 MMHG | DIASTOLIC BLOOD PRESSURE: 72 MMHG | OXYGEN SATURATION: 95 % | BODY MASS INDEX: 36.86 KG/M2 | HEART RATE: 88 BPM

## 2024-10-02 DIAGNOSIS — E66.01 CLASS 2 SEVERE OBESITY WITH BODY MASS INDEX (BMI) OF 35 TO 39.9 WITH SERIOUS COMORBIDITY: Primary | ICD-10-CM

## 2024-10-02 DIAGNOSIS — E66.812 CLASS 2 SEVERE OBESITY WITH BODY MASS INDEX (BMI) OF 35 TO 39.9 WITH SERIOUS COMORBIDITY: Primary | ICD-10-CM

## 2024-10-02 PROCEDURE — 3074F SYST BP LT 130 MM HG: CPT | Mod: CPTII,S$GLB,, | Performed by: INTERNAL MEDICINE

## 2024-10-02 PROCEDURE — 1160F RVW MEDS BY RX/DR IN RCRD: CPT | Mod: CPTII,S$GLB,, | Performed by: INTERNAL MEDICINE

## 2024-10-02 PROCEDURE — 99999 PR PBB SHADOW E&M-EST. PATIENT-LVL V: CPT | Mod: PBBFAC,,, | Performed by: INTERNAL MEDICINE

## 2024-10-02 PROCEDURE — 1159F MED LIST DOCD IN RCRD: CPT | Mod: CPTII,S$GLB,, | Performed by: INTERNAL MEDICINE

## 2024-10-02 PROCEDURE — 99213 OFFICE O/P EST LOW 20 MIN: CPT | Mod: S$GLB,,, | Performed by: INTERNAL MEDICINE

## 2024-10-02 PROCEDURE — 3078F DIAST BP <80 MM HG: CPT | Mod: CPTII,S$GLB,, | Performed by: INTERNAL MEDICINE

## 2024-10-02 PROCEDURE — 3008F BODY MASS INDEX DOCD: CPT | Mod: CPTII,S$GLB,, | Performed by: INTERNAL MEDICINE

## 2024-10-02 PROCEDURE — 4010F ACE/ARB THERAPY RXD/TAKEN: CPT | Mod: CPTII,S$GLB,, | Performed by: INTERNAL MEDICINE

## 2024-10-02 RX ORDER — TOPIRAMATE 100 MG/1
100 TABLET, FILM COATED ORAL 2 TIMES DAILY
Qty: 180 TABLET | Refills: 1 | Status: SHIPPED | OUTPATIENT
Start: 2024-10-02 | End: 2025-10-02

## 2024-10-02 RX ORDER — TIRZEPATIDE 5 MG/.5ML
5 INJECTION, SOLUTION SUBCUTANEOUS
Qty: 2 ML | Refills: 3 | Status: SHIPPED | OUTPATIENT
Start: 2024-10-02
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
## 2024-10-02 NOTE — PROGRESS NOTES
Subjective:       Patient ID: Ken Anderson is a 39 y.o. male.    Chief Complaint: Follow-up      CC: weight loss.   Pt here today for follow-up. Has lost 30.5 lbs (-7.7 lbs muscle. -37.3 lbs fat). Pt was to start 1800 jeremy pb meal planner and ended up starting zepbound, currently getting 5 mg vial. Just went up to 5 mg in pas week. Denies SE. Has felt more decrease in appetite in past.   He denies SE. He had noted some weight gain. He had initially coorelated with with change in PReP, but had also had covid just before he noted the weight gain. He does feel like some of the weight he gained has lost, and thngs seemed to have stabilized with that. . Has been under 2000 jeremy a day still. Having more protein. Tracking on rashida. He has continued to exercise 6-7 hours a week.  He was holding pristiq for a while, and had more weight loss when he resumed it.   He has had some diarrhea with recent med changes.    Prev med hx :  On Vyvanse for ADHD. filled monthly.   checked today   On metformin.   topiramate 100 mg BID. Only SE has been some mild trouble with word recall that has resolved.   New BMR: 2352    New PBF: 33.2%        Initial:  BMR: 2475    PBF:  35.6%      Lab Results       Component                Value               Date                       HGBA1C                   5.0                 10/25/2023                 HGBA1C                   5.3                 03/22/2022            Lab Results       Component                Value               Date                       LDLCALC                  94                  07/30/2024                 CREATININE               0.9                 10/25/2023                    Review of Systems      Objective:     /72   Pulse 88   Wt (!) 137.3 kg (302 lb 12.8 oz)   SpO2 95%   BMI 36.86 kg/m²    Physical Exam  Vitals reviewed.   Constitutional:       General: He is not in acute distress.     Appearance: He is well-developed.      Comments: Muscular build  with obesity   HENT:      Head: Normocephalic and atraumatic.   Eyes:      General: No scleral icterus.     Pupils: Pupils are equal, round, and reactive to light.   Cardiovascular:      Rate and Rhythm: Normal rate and regular rhythm.   Pulmonary:      Effort: Pulmonary effort is normal. No respiratory distress.   Musculoskeletal:         General: Normal range of motion.      Cervical back: Normal range of motion and neck supple.   Skin:     General: Skin is warm and dry.   Neurological:      Mental Status: He is alert and oriented to person, place, and time.   Psychiatric:         Behavior: Behavior normal.         Judgment: Judgment normal.         Assessment:       Problem List Items Addressed This Visit    None  Visit Diagnoses       Class 2 severe obesity with body mass index (BMI) of 35 to 39.9 with serious comorbidity    -  Primary                        Plan:             Ken was seen today for follow-up.    Diagnoses and all orders for this visit:    Class 2 severe obesity with body mass index (BMI) of 35 to 39.9 with serious comorbidity  -     tirzepatide, weight loss, (ZEPBOUND) 5 mg/0.5 mL Soln; Inject 0.5 mLs (5 mg total) into the skin every 7 days.    Other orders  -     topiramate (TOPAMAX) 100 MG tablet; Take 1 tablet (100 mg total) by mouth 2 (two) times daily.        Zepbound 5 mg weekly.       Decrease portions as soon as you start Zepbound. Avoid fried, rich or greasy foods.      Some nausea in the first 2 weeks is not unusual.     If you get pain across the upper abdomen and around to your back, please call the office.       [email or web address] to sign up for coupon card.                 Patient was informed that topiramate is used for migraine prevention and seizures. Weight loss is a common side effect that is well documented. S/he understands this. S/he was informed of the potential side effects such as serious and possibly fatal rash in which case the  medication should be discontinued immediately. Paresthesias, forgetfulness, fatigue, kidney stones, GI symptoms, and changes in lab values such as electrolytes, blood counts and kidney function.      topiramate 100 mg  morning and evening.     Continue 2000 cal meal planner      HUrricane tips given.